# Patient Record
Sex: FEMALE | Race: WHITE | NOT HISPANIC OR LATINO | Employment: OTHER | URBAN - METROPOLITAN AREA
[De-identification: names, ages, dates, MRNs, and addresses within clinical notes are randomized per-mention and may not be internally consistent; named-entity substitution may affect disease eponyms.]

---

## 2017-01-02 ENCOUNTER — HOSPITAL ENCOUNTER (EMERGENCY)
Facility: HOSPITAL | Age: 47
Discharge: HOME | End: 2017-01-02
Attending: EMERGENCY MEDICINE
Payer: MEDICAID

## 2017-01-02 VITALS
SYSTOLIC BLOOD PRESSURE: 153 MMHG | DIASTOLIC BLOOD PRESSURE: 80 MMHG | HEART RATE: 99 BPM | TEMPERATURE: 98 F | BODY MASS INDEX: 27.88 KG/M2 | HEIGHT: 60 IN | OXYGEN SATURATION: 99 % | RESPIRATION RATE: 18 BRPM | WEIGHT: 142 LBS

## 2017-01-02 DIAGNOSIS — M25.571 ANKLE PAIN, RIGHT: ICD-10-CM

## 2017-01-02 DIAGNOSIS — S93.401A SPRAIN OF RIGHT ANKLE, UNSPECIFIED LIGAMENT, INITIAL ENCOUNTER: Primary | ICD-10-CM

## 2017-01-02 LAB
B-HCG UR QL: NEGATIVE
CTP QC/QA: YES

## 2017-01-02 PROCEDURE — 96372 THER/PROPH/DIAG INJ SC/IM: CPT

## 2017-01-02 PROCEDURE — 99284 EMERGENCY DEPT VISIT MOD MDM: CPT | Mod: 25

## 2017-01-02 PROCEDURE — 81025 URINE PREGNANCY TEST: CPT

## 2017-01-02 PROCEDURE — 63600175 PHARM REV CODE 636 W HCPCS: Performed by: EMERGENCY MEDICINE

## 2017-01-02 RX ORDER — OXYCODONE AND ACETAMINOPHEN 5; 325 MG/1; MG/1
1 TABLET ORAL EVERY 8 HOURS PRN
Qty: 18 TABLET | Refills: 0 | Status: ON HOLD | OUTPATIENT
Start: 2017-01-02 | End: 2017-04-27 | Stop reason: HOSPADM

## 2017-01-02 RX ORDER — HYDROMORPHONE HYDROCHLORIDE 2 MG/ML
1 INJECTION, SOLUTION INTRAMUSCULAR; INTRAVENOUS; SUBCUTANEOUS
Status: COMPLETED | OUTPATIENT
Start: 2017-01-02 | End: 2017-01-02

## 2017-01-02 RX ADMIN — HYDROMORPHONE HYDROCHLORIDE 1 MG: 2 INJECTION, SOLUTION INTRAMUSCULAR; INTRAVENOUS; SUBCUTANEOUS at 04:01

## 2017-01-02 NOTE — ED TRIAGE NOTES
Pt presents to ED c/o dizziness x 3 weeks and also injuring her right ankle after falling.  States she was on a ventilator a month ago due to pneumonia and she has become dizzy ever since.  Denies sob, ha, chest pains, nvd.

## 2017-01-02 NOTE — ED PROVIDER NOTES
Encounter Date: 1/2/2017    SCRIBE #1 NOTE: I, Mitch Thomas, am scribing for, and in the presence of,  Selin Garcia MD. I have scribed the following portions of the note - Other sections scribed: HPI, ROS.       History     Chief Complaint   Patient presents with    Dizziness     pt c/o dizziness X 3 wks and (R) ankle injury after twisting it walking down the stairs.     Review of patient's allergies indicates:  No Known Allergies  HPI Comments: CC: Leg pain    HPI: This 46 y.o. F, who has a past medical history of Asthma; Bipolar 1 disorder; Manic depression; and Seizures, presents to the ED for evaluation of right ankle pain secondary to a mechanical fall 2 nights ago. She was walking down wet steps when she slipped and fell downward on her buttocks; She landed on her right ankle and lower leg, which was flexed under her body. Pain is severe and worse with weight bearing. Norco is not relieving her pain. She had all of her top teeth pulled 5 days ago and is taking Norco for pain relief. She is scheduled be fitted for dentures at the dentist's office in three days. She was also recently admitted for a severe asthma exacerbation but, on exam, denies any shortness of breath or chest pain. She is compliant with pulmonary medications. She denies fever, nausea, vomiting, back pain and headache.    The history is provided by the patient and a relative.     Past Medical History   Diagnosis Date    Asthma     Bipolar 1 disorder     Manic depression     Seizures      No past medical history pertinent negatives.  Past Surgical History   Procedure Laterality Date    Hysterectomy      Appendectomy      Carpal tunnel release      Hernia repair       Family History   Problem Relation Age of Onset    Schizophrenia Mother     Diabetes Father     Heart disease Father      Social History   Substance Use Topics    Smoking status: Current Every Day Smoker     Packs/day: 1.00     Years: 30.00     Types: Cigarettes     Smokeless tobacco: None    Alcohol use 3.0 oz/week     5 Cans of beer per week     Review of Systems   Constitutional: Negative for fever.   HENT: Negative for sore throat.    Eyes: Negative for visual disturbance.   Respiratory: Negative for shortness of breath.    Cardiovascular: Negative for chest pain.   Gastrointestinal: Negative for nausea and vomiting.   Genitourinary: Negative for dysuria.   Musculoskeletal: Positive for arthralgias (Right ankle pain). Negative for back pain.   Skin: Negative for rash.   Neurological: Negative for headaches.       Physical Exam   Initial Vitals   BP Pulse Resp Temp SpO2   01/02/17 1455 01/02/17 1455 01/02/17 1455 01/02/17 1455 01/02/17 1455   153/80 99 18 98.4 °F (36.9 °C) 99 %     Physical Exam    Nursing note and vitals reviewed.  Constitutional: She appears well-developed and well-nourished. She is not diaphoretic. No distress.   HENT:   Head: Normocephalic and atraumatic.   Mouth/Throat: Abnormal dentition (no top teeth; no sign of infection). No dental abscesses or dental caries.   Eyes: EOM are normal.   Neck: Normal range of motion. Neck supple.   Cardiovascular: Normal rate and regular rhythm.   Pulmonary/Chest: Breath sounds normal. No respiratory distress. She has no wheezes.   Abdominal: Soft. Normal appearance and bowel sounds are normal. She exhibits no distension. There is no tenderness. There is no rebound and no guarding.   Musculoskeletal: Normal range of motion. She exhibits no edema.        Right ankle: She exhibits swelling and ecchymosis. Tenderness.        Right foot: There is tenderness.   Neurological: She is alert and oriented to person, place, and time.   Psychiatric: She has a normal mood and affect.         ED Course   Procedures  Labs Reviewed   POCT URINE PREGNANCY          X-Rays:   Independently Interpreted Readings:   Other Readings:  XR ankle, right: no acute fracture     Medical Decision Making:   History:   Old Medical Records: I decided  to obtain old medical records.  Initial Assessment:   This is an emergent evaluation of a 46-year-old woman who presented to the emergency department secondary to right ankle pain.  Differential Diagnosis:    Differential diagnoses included acute fracture, dislocation, musculoskeletal strain vs sprain, contusion.   Independently Interpreted Test(s):   I have ordered and independently interpreted X-rays - see prior notes.  Clinical Tests:   Lab Tests: Ordered and Reviewed  The following lab test(s) were unremarkable: UPT  Radiological Study: Ordered and Reviewed  ED Management:  On physical exam, pt c/o ttp to right ankle. No obvious deformity. Pulses intact. XR obtained and revealed:     Postoperative changes noted at the base of the 5th metatarsal.  No acute fractures or dislocations visualized. Ankle mortise is symmetric.  No erosive osseous changes demonstrated.There is suggestion of mild subcutaneous edema along the medial aspect of the ankle which is nonspecific.    Pt placed in ankle air splint and dc'd with Percocet for pain.  I have given strict return precautions and instructed to follow with PCP in 2-3 days for reevaluation.     Selin Garcia MD  16:41 PM  1/02/2017                 Scribe Attestation:   Scribe #1: I performed the above scribed service and the documentation accurately describes the services I performed. I attest to the accuracy of the note.    Attending Attestation:           Physician Attestation for Scribe:  Physician Attestation Statement for Scribe #1: I, Selin Garcia MD, reviewed documentation, as scribed by Mitch Thomas in my presence, and it is both accurate and complete.                 ED Course     Clinical Impression:   The primary encounter diagnosis was Sprain of right ankle, unspecified ligament, initial encounter. A diagnosis of Ankle pain, right was also pertinent to this visit.          Selin Garcia MD  01/11/17 7640

## 2017-01-02 NOTE — ED AVS SNAPSHOT
[facility]  [address] [place] [address]               Alina Menchaca   2017  3:12 PM   ED    Description:  Female : 1970   Department:  [facility]           Your Care was Coordinated By:     Provider Role From To    Selin Garcia MD Attending Provider 17 1519 --      Reason for Visit     Dizziness           Diagnoses this Visit        Comments    Sprain of right ankle, unspecified ligament, initial encounter    -  Primary     Ankle pain, right           ED Disposition     None           To Do List           Follow-up Information     Follow up with Meghan Hanna MD. Schedule an appointment as soon as possible for a visit in 1 week.    Specialty:  Family Medicine    Contact information:    [address] [place] [address]  [phone]          Schedule an appointment as soon as possible for a visit with Florencio Song Iii, MD.    Specialty:  Orthopedic Surgery    Why:  If symptoms worsen    Contact information:    [address] [address]  [phone]         These Medications        Disp Refills Start End    oxycodone-acetaminophen (PERCOCET) 5-325 mg per tablet 18 tablet 0 2017     Take 1 tablet by mouth every 8 (eight) hours as needed for Pain (take 1-2 tablets by mouth every 8 hrs as needed for pain. Do not drink alcohol or drive while taking these. Do not take with other sedating medications.). - Oral    Pharmacy: [facility] [address] [place] [address] [place] [address] AT [facility] Ph #: [phone]         [facility]     [facility] -  Assistance  Registered nurses in the [facility] provide clinical advisement, health education, appointment booking, and other advisory services.  Call for this free service at [phone].             Medications           Message regarding Medications     Verify the changes and/or additions to  your medication regime listed below are the same as discussed with your clinician today.  If any of these changes or additions are incorrect, please notify your healthcare provider.        START taking these NEW medications        Refills    oxycodone-acetaminophen (PERCOCET) 5-325 mg per tablet 0    Sig: Take 1 tablet by mouth every 8 (eight) hours as needed for Pain (take 1-2 tablets by mouth every 8 hrs as needed for pain. Do not drink alcohol or drive while taking these. Do not take with other sedating medications.).    Class: Print    Route: Oral      These medications were administered today        Dose Freq    hydromorphone (PF) injection 1 mg 1 mg ED 1 Time    Sig: Inject 0.5 mLs (1 mg total) into the muscle ED 1 Time.    Class: Normal    Route: Intramuscular      STOP taking these medications     hydrocodone-acetaminophen 5-325mg (NORCO) 5-325 mg per tablet Take 1 tablet by mouth every 6 (six) hours as needed for Pain.           Verify that the below list of medications is an accurate representation of the medications you are currently taking.  If none reported, the list may be blank. If incorrect, please contact your healthcare provider. Carry this list with you in case of emergency.           Current Medications     amantadine HCl (SYMMETREL) 100 mg capsule Take 100 mg by mouth 2 (two) times daily.    citalopram (CELEXA) 40 MG tablet Take 40 mg by mouth once daily.    furosemide (LASIX) 20 MG tablet Take 1 tablet (20 mg total) by mouth once daily.    HYDROXYZINE PAMOATE (VISTARIL ORAL) Take by mouth as needed.     levetiracetam (KEPPRA) 500 MG Tab Take 500 mg by mouth 2 (two) times daily.    phenytoin (DILANTIN) 100 MG ER capsule Take by mouth 3 (three) times daily.    quetiapine (SEROQUEL) 300 MG Tab Take 300 mg by mouth once daily.     ramelteon (ROZEREM) 8 mg tablet Take 1 tablet (8 mg total) by mouth nightly.    albuterol (PROVENTIL) 2.5 mg /3 mL (0.083 %) nebulizer solution Take 3 mLs (2.5 mg total)  by nebulization every 6 (six) hours as needed for Wheezing.    alprazolam (XANAX) 0.5 MG tablet Take 1 tablet (0.5 mg total) by mouth 3 (three) times daily.    benzonatate (TESSALON) 100 MG capsule Take 1 capsule (100 mg total) by mouth 3 (three) times daily as needed for Cough.    cephALEXin (KEFLEX) 500 MG capsule Take 1 capsule (500 mg total) by mouth every 8 (eight) hours.    FLUTICASONE/SALMETEROL (ADVAIR DISKUS INHL) Inhale into the lungs.    hydromorphone (PF) injection 1 mg Inject 0.5 mLs (1 mg total) into the muscle ED 1 Time.    oxycodone-acetaminophen (PERCOCET) 5-325 mg per tablet Take 1 tablet by mouth every 8 (eight) hours as needed for Pain (take 1-2 tablets by mouth every 8 hrs as needed for pain. Do not drink alcohol or drive while taking these. Do not take with other sedating medications.).           Clinical Reference Information           Your Vitals Were     BP Pulse Temp Resp Height Weight    153/80 99 98.4 °F (36.9 °C) 18 5' (1.524 m) 64.4 kg (142 lb)    Last Period SpO2 BMI          (Within Weeks) 99% 27.73 kg/m2        Allergies as of 1/2/2017     No Known Allergies      Immunizations Administered on Date of Encounter - 1/2/2017     None      ED Micro, Lab, POCT     Start Ordered       Status Ordering Provider    01/02/17 1505 01/02/17 1505  POCT urine pregnancy  Once      Final result       ED Imaging Orders     Start Ordered       Status Ordering Provider    01/02/17 1512 01/02/17 1512  X-Ray Ankle Complete Right  1 time imaging      Final result         Discharge Instructions         Ankle Inversion (Strength)     This exercise is for your right ankle. Switch sides for your left ankle.   1. Tie an elastic exercise band or tubing to the leg of a table. Tie the other end to your right foot.  2. Stand far enough away from the table so that the elastic band or tubing is pulled tight.  3. Point your right foot firmly to the left, pulling on the elastic band or tubing. Hold for 5  seconds.  4. Relax your foot back to a straight position. Repeat this exercise 10 times.  5. Do this exercise 3 times a day, or as instructed.  © 3945-4871 [employer]. [address] [place] [address], [place] [address]. All rights reserved. This information is not intended as a substitute for professional medical care. Always follow your healthcare professional's instructions.        Understanding Ankle Sprain    The ankle is the joint where the leg and foot meet. Bones are held in place by connective tissue called ligaments. When ankle ligaments are stretched to the point of pain and injury, it is called an ankle sprain. A sprain can tear the ligaments. These tears can be very small but still cause pain. Ankle sprains can be mild or severe.  What causes an ankle sprain?  A sprain may occur when you twist your ankle or bend it too far. This can happen when you stumble or fall. Things that can make an ankle sprain more likely include:  · Having had an ankle sprain before  · Playing sports that involve running and jumping. Or playing contact sports such as football or hockey.  · Wearing shoes that dont support your feet and ankles well  · Having ankles with poor strength and flexibility  Symptoms of an ankle sprain  Symptoms may include:  · Pain or soreness in the ankle  · Swelling  · Redness or bruising  · Not being able to walk or put weight on the affected foot  · Reduced range of motion in the ankle  · A popping or tearing feeling at the time the sprain occurs  · An abnormal or dislocated look to the ankle  · Instability or too much range of motion in the ankle  Treatment for an ankle sprain  Treatment focuses on reducing pain and swelling, and avoiding further injury. Treatments may include:  · Resting the ankle. Avoid putting weight on it. This may mean using crutches until the sprain heals.  · Prescription or over-the-counter pain medicines. These help reduce swelling and pain.  · Cold packs. These help  reduce pain and swelling.  · Raising your ankle above your heart. This helps reduce swelling.  · Wrapping the ankle with an elastic bandage or ankle brace. This helps reduce swelling and gives some support to the ankle. In rare cases, you may need a cast or boot.  · Stretching and other exercises. These improve flexibility and strength.  · Heat packs. These may be recommended before doing ankle exercises.  Possible complications of an ankle sprain  An ankle that has been weakened by a sprain can be more likely to have repeated sprains afterward. Doing exercises to strengthen your ankle and improve balance can reduce your risk for repeated sprains. Other possible complications are long-term (chronic) pain or an ankle that remains unstable.  When to call your healthcare provider  Call your healthcare provider right away if you have any of these:  · Fever of 100.4°F (38°C) or higher, or as directed  · Pain, numbness, discoloration, or coldness in the foot or toes  · Pain that gets worse  · Symptoms that dont get better, or get worse  · New symptoms   © 0558-1576 [employer]. [address] [place] [address]. All rights reserved. This information is not intended as a substitute for professional medical care. Always follow your healthcare professional's instructions.            Smoking Cessation     If you would like to quit smoking:   You may be eligible for free services if you are a [place] resident and started smoking cigarettes before September 1, 1988.  Call the Smoking Cessation Trust (SCT) toll free at (558) 109-2115 or (287) 030-0056.   Call 1-800-QUIT-NOW if you do not meet the above criteria.             [facility] complies with applicable Federal civil rights laws and does not discriminate on the basis of race, color, national origin, age, disability, or sex.        Language Assistance Services     ATTENTION: Language assistance services are available, free of  charge. Please call [phone].      ATENCIÓN: Si habla español, tiene a olguin disposición servicios gratuitos de asistencia lingüística. Llame al [phone].     CHÚ Ý: N?u b?n nói Ti?ng Vi?t, có các d?ch v? h? tr? ngôn ng? mi?n phí dành cho b?n. G?i s? [phone].

## 2017-01-02 NOTE — DISCHARGE INSTRUCTIONS
Ankle Inversion (Strength)     This exercise is for your right ankle. Switch sides for your left ankle.   1. Tie an elastic exercise band or tubing to the leg of a table. Tie the other end to your right foot.  2. Stand far enough away from the table so that the elastic band or tubing is pulled tight.  3. Point your right foot firmly to the left, pulling on the elastic band or tubing. Hold for 5 seconds.  4. Relax your foot back to a straight position. Repeat this exercise 10 times.  5. Do this exercise 3 times a day, or as instructed.  © 5324-5853 [facility]. [address] [place] [address]. All rights reserved. This information is not intended as a substitute for professional medical care. Always follow your healthcare professional's instructions.        Understanding Ankle Sprain    The ankle is the joint where the leg and foot meet. Bones are held in place by connective tissue called ligaments. When ankle ligaments are stretched to the point of pain and injury, it is called an ankle sprain. A sprain can tear the ligaments. These tears can be very small but still cause pain. Ankle sprains can be mild or severe.  What causes an ankle sprain?  A sprain may occur when you twist your ankle or bend it too far. This can happen when you stumble or fall. Things that can make an ankle sprain more likely include:  · Having had an ankle sprain before  · Playing sports that involve running and jumping. Or playing contact sports such as football or hockey.  · Wearing shoes that dont support your feet and ankles well  · Having ankles with poor strength and flexibility  Symptoms of an ankle sprain  Symptoms may include:  · Pain or soreness in the ankle  · Swelling  · Redness or bruising  · Not being able to walk or put weight on the affected foot  · Reduced range of motion in the ankle  · A popping or tearing feeling at the time the sprain occurs  · An abnormal or dislocated look to the  ankle  · Instability or too much range of motion in the ankle  Treatment for an ankle sprain  Treatment focuses on reducing pain and swelling, and avoiding further injury. Treatments may include:  · Resting the ankle. Avoid putting weight on it. This may mean using crutches until the sprain heals.  · Prescription or over-the-counter pain medicines. These help reduce swelling and pain.  · Cold packs. These help reduce pain and swelling.  · Raising your ankle above your heart. This helps reduce swelling.  · Wrapping the ankle with an elastic bandage or ankle brace. This helps reduce swelling and gives some support to the ankle. In rare cases, you may need a cast or boot.  · Stretching and other exercises. These improve flexibility and strength.  · Heat packs. These may be recommended before doing ankle exercises.  Possible complications of an ankle sprain  An ankle that has been weakened by a sprain can be more likely to have repeated sprains afterward. Doing exercises to strengthen your ankle and improve balance can reduce your risk for repeated sprains. Other possible complications are long-term (chronic) pain or an ankle that remains unstable.  When to call your healthcare provider  Call your healthcare provider right away if you have any of these:  · Fever of 100.4°F (38°C) or higher, or as directed  · Pain, numbness, discoloration, or coldness in the foot or toes  · Pain that gets worse  · Symptoms that dont get better, or get worse  · New symptoms   © 5932-8621 [employer]. [address] [place] [address], [place] [address]. All rights reserved. This information is not intended as a substitute for professional medical care. Always follow your healthcare professional's instructions.

## 2017-03-27 PROCEDURE — 99284 EMERGENCY DEPT VISIT MOD MDM: CPT

## 2017-03-28 ENCOUNTER — HOSPITAL ENCOUNTER (EMERGENCY)
Facility: HOSPITAL | Age: 47
Discharge: HOME | End: 2017-03-28
Attending: EMERGENCY MEDICINE
Payer: MEDICAID

## 2017-03-28 VITALS
OXYGEN SATURATION: 98 % | HEIGHT: 61 IN | TEMPERATURE: 98 F | SYSTOLIC BLOOD PRESSURE: 135 MMHG | HEART RATE: 93 BPM | BODY MASS INDEX: 28.32 KG/M2 | RESPIRATION RATE: 18 BRPM | WEIGHT: 150 LBS | DIASTOLIC BLOOD PRESSURE: 67 MMHG

## 2017-03-28 DIAGNOSIS — M25.561 ACUTE PAIN OF RIGHT KNEE: ICD-10-CM

## 2017-03-28 DIAGNOSIS — S20.211A RIB CONTUSION, RIGHT, INITIAL ENCOUNTER: Primary | ICD-10-CM
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
## 2017-03-28 DIAGNOSIS — R07.9 CHEST PAIN: ICD-10-CM

## 2017-03-28 PROCEDURE — 25000003 PHARM REV CODE 250: Performed by: NURSE PRACTITIONER

## 2017-03-28 PROCEDURE — 93005 ELECTROCARDIOGRAM TRACING: CPT

## 2017-03-28 RX ORDER — TRAZODONE HYDROCHLORIDE 100 MG/1
100 TABLET ORAL NIGHTLY
COMMUNITY
End: 2020-05-12

## 2017-03-28 RX ORDER — RISPERIDONE 0.25 MG/1
TABLET ORAL
COMMUNITY
End: 2017-04-18

## 2017-03-28 RX ORDER — HYDROCODONE BITARTRATE AND ACETAMINOPHEN 5; 325 MG/1; MG/1
1 TABLET ORAL
Status: COMPLETED | OUTPATIENT
Start: 2017-03-28 | End: 2017-03-28

## 2017-03-28 RX ORDER — MELOXICAM 7.5 MG/1
15 TABLET ORAL DAILY
Qty: 14 TABLET | Refills: 0 | Status: SHIPPED | OUTPATIENT
Start: 2017-03-28 | End: 2017-04-11

## 2017-03-28 RX ADMIN — HYDROCODONE BITARTRATE AND ACETAMINOPHEN 1 TABLET: 5; 325 TABLET ORAL at 02:03

## 2017-03-28 NOTE — ED AVS SNAPSHOT
[facility]  [address] [place] [address]               Alina Menchaca   3/28/2017  1:12 AM   ED    Description:  Female : 1970   Department:  [facility]           Your Care was Coordinated By:     Provider Role From To    Max Mandel III, MD Attending Provider 17 --    Bhavana Farooq NP Nurse Practitioner 17 --      Reason for Visit     Knee Pain           Diagnoses this Visit        Comments    Rib contusion, right, initial encounter    -  Primary     Chest pain         Fall, initial encounter         Acute pain of right knee           ED Disposition     None           To Do List           Follow-up Information     Schedule an appointment as soon as possible for a visit with Meghan Hanna MD.    Specialty:  Family Medicine    Why:  As needed, If symptoms worsen    Contact information:    [address] [place] [address]  [phone]         These Medications        Disp Refills Start End    meloxicam (MOBIC) 7.5 MG tablet 14 tablet 0 3/28/2017 2017    Take 2 tablets (15 mg total) by mouth once daily. - Oral    [facility] [address] [place] [address] -  [address] AT [facility] [address] Ph #: [phone]         [facility]     [facility] -  Assistance  Registered nurses in the [facility] provide clinical advisement, health education, appointment booking, and other advisory services.  Call for this free service at [phone].             Medications           Message regarding Medications     Verify the changes and/or additions to your medication regime listed below are the same as discussed with your clinician today.  If any of these changes or additions are incorrect, please notify your healthcare provider.        START taking these NEW medications        Refills    meloxicam (MOBIC) 7.5 MG tablet 0    Sig: Take 2 tablets  (15 mg total) by mouth once daily.    Class: Print    Route: Oral      These medications were administered today        Dose Freq    hydrocodone-acetaminophen 5-325mg per tablet 1 tablet 1 tablet ED 1 Time    Sig: Take 1 tablet by mouth ED 1 Time.    Class: Normal    Route: Oral    Cosign for Ordering: Required by Max Mandel III, MD           Verify that the below list of medications is an accurate representation of the medications you are currently taking.  If none reported, the list may be blank. If incorrect, please contact your healthcare provider. Carry this list with you in case of emergency.           Current Medications     albuterol (PROVENTIL) 2.5 mg /3 mL (0.083 %) nebulizer solution Take 3 mLs (2.5 mg total) by nebulization every 6 (six) hours as needed for Wheezing.    alprazolam (XANAX) 0.5 MG tablet Take 1 tablet (0.5 mg total) by mouth 3 (three) times daily.    amantadine HCl (SYMMETREL) 100 mg capsule Take 100 mg by mouth 2 (two) times daily.    citalopram (CELEXA) 40 MG tablet Take 40 mg by mouth once daily.    FLUTICASONE/SALMETEROL (ADVAIR DISKUS INHL) Inhale into the lungs.    furosemide (LASIX) 20 MG tablet Take 1 tablet (20 mg total) by mouth once daily.    HYDROXYZINE PAMOATE (VISTARIL ORAL) Take by mouth as needed.     levetiracetam (KEPPRA) 500 MG Tab Take 500 mg by mouth 2 (two) times daily.    phenytoin (DILANTIN) 100 MG ER capsule Take by mouth 3 (three) times daily.    risperidone (RISPERDAL) 0.25 MG Tab Take by mouth.    trazodone (DESYREL) 100 MG tablet Take 100 mg by mouth every evening.    benzonatate (TESSALON) 100 MG capsule Take 1 capsule (100 mg total) by mouth 3 (three) times daily as needed for Cough.    cephALEXin (KEFLEX) 500 MG capsule Take 1 capsule (500 mg total) by mouth every 8 (eight) hours.    meloxicam (MOBIC) 7.5 MG tablet Take 2 tablets (15 mg total) by mouth once daily.    oxycodone-acetaminophen (PERCOCET) 5-325 mg per tablet Take 1 tablet by mouth every 8  "(eight) hours as needed for Pain (take 1-2 tablets by mouth every 8 hrs as needed for pain. Do not drink alcohol or drive while taking these. Do not take with other sedating medications.).    quetiapine (SEROQUEL) 300 MG Tab Take 300 mg by mouth once daily.     ramelteon (ROZEREM) 8 mg tablet Take 1 tablet (8 mg total) by mouth nightly.           Clinical Reference Information           Your Vitals Were     BP Pulse Temp Resp Height Weight    135/67 (BP Location: Left arm, Patient Position: Sitting) 93 97.7 °F (36.5 °C) (Oral) 18 5' 1" (1.549 m) 68 kg (150 lb)    Last Period SpO2 BMI          (Within Weeks) 98% 28.34 kg/m2        Allergies as of 3/28/2017     No Known Allergies      Immunizations Administered on Date of Encounter - 3/28/2017     None      ED Micro, Lab, POCT     Start Ordered       Status Ordering Provider    03/28/17 0035 03/28/17 0034    Once,   Status:  Canceled      Canceled       ED Imaging Orders     Start Ordered       Status Ordering Provider    03/28/17 0159 03/28/17 0159  X-Ray Chest PA And Lateral  1 time imaging      Final result         Discharge Instructions       Please return to the ED for any new or worsening symptoms: chest pain, shortness of breath, loss of consciousness or any other concerns. Please follow up with primary care within in the week. You may also call [phone] for the [facility] same day appointment line.    Discharge References/Attachments     ACE WRAP (ENGLISH)    CHEST WALL CONTUSION (ENGLISH)      Smoking Cessation     If you would like to quit smoking:   You may be eligible for free services if you are a [place] resident and started smoking cigarettes before September 1, 1988.  Call the Smoking Cessation Trust (SCT) toll free at (087) 729-3912 or (909) 862-2429.   Call 0-583-QUIT-NOW if you do not meet the above criteria.             [facility] complies with applicable Federal civil rights laws and does not discriminate on the " basis of race, color, national origin, age, disability, or sex.        Language Assistance Services     ATTENTION: Language assistance services are available, free of charge. Please call [phone].      ATENCIÓN: Si habla zakiya, tiene a olguin disposición servicios gratuitos de asistencia lingüística. Llame al [phone].     CHÚ Ý: N?u b?n nói Ti?ng Vi?t, có các d?ch v? h? tr? ngôn ng? mi?n phí dành cho b?n. G?i s? [phone].

## 2017-03-28 NOTE — DISCHARGE INSTRUCTIONS
Please return to the ED for any new or worsening symptoms: chest pain, shortness of breath, loss of consciousness or any other concerns. Please follow up with primary care within in the week. You may also call [phone] for the [facility] same day appointment line.

## 2017-03-28 NOTE — ED PROVIDER NOTES
Encounter Date: 3/27/2017       History     Chief Complaint   Patient presents with    Knee Pain     pt reports that her knee locked on her which caused her to fall and now she has pain in her abdominal area and cannot lift her right arm     Review of patient's allergies indicates:  No Known Allergies  HPI Comments: Chief Complaint: Right knee and left chest wall pain x2 days    HPI: This is a 46-year-old female who presents to the ED with complaints of acute, moderate right-sided knee and left chest wall pain for 2 days.  Patient reports that while walking downstairs, her right knee gave out and she fell 3 days ago.  She denies head trauma, loss consciousness, syncope.  She reports this happened again 2 days ago.  She reports hitting her left chest wall and a table.  Chest pain is worse with deep inspiration.  Mild relief with ibuprofen and BC powder.    The history is provided by the patient.     Past Medical History:   Diagnosis Date    Asthma     Bipolar 1 disorder     Manic depression     Seizures      Past Surgical History:   Procedure Laterality Date    APPENDECTOMY      CARPAL TUNNEL RELEASE      HERNIA REPAIR      HYSTERECTOMY       Family History   Problem Relation Age of Onset    Schizophrenia Mother     Diabetes Father     Heart disease Father      Social History   Substance Use Topics    Smoking status: Current Every Day Smoker     Packs/day: 1.00     Years: 30.00     Types: Cigarettes    Smokeless tobacco: None    Alcohol use No     Review of Systems   Constitutional: Negative for chills and fever.   HENT: Negative for congestion.    Respiratory: Negative for cough and shortness of breath.    Cardiovascular: Positive for chest pain (left anterior ribs).   Gastrointestinal: Negative for abdominal pain, diarrhea, nausea and vomiting.   Genitourinary: Negative for flank pain.   Musculoskeletal: Positive for arthralgias (right knee).   Skin: Negative for rash and wound.   Neurological:  Negative for seizures, syncope and headaches.       Physical Exam   Initial Vitals   BP Pulse Resp Temp SpO2   03/27/17 2331 03/27/17 2331 03/27/17 2331 03/27/17 2331 03/27/17 2331   135/67 93 18 97.7 °F (36.5 °C) 98 %     Physical Exam    Constitutional: Vital signs are normal. She appears well-developed and well-nourished.  Non-toxic appearance.   Eyes: EOM are normal.   Neck: Full passive range of motion without pain. Neck supple. No rigidity.   Cardiovascular: Normal rate, S1 normal, S2 normal and normal heart sounds. Exam reveals no gallop.    No murmur heard.  Pulmonary/Chest: Effort normal and breath sounds normal. No tachypnea. She has no decreased breath sounds. She has no wheezes. She has no rhonchi. She has no rales. She exhibits tenderness. She exhibits no crepitus and no retraction.       Musculoskeletal:        Right knee: She exhibits normal range of motion, no swelling, no effusion, no ecchymosis, no deformity, no erythema, normal alignment, no LCL laxity, no bony tenderness and no MCL laxity. Tenderness found. Medial joint line tenderness noted.   Neurological: She is alert and oriented to person, place, and time. She has normal strength. Gait normal. GCS eye subscore is 4. GCS verbal subscore is 5. GCS motor subscore is 6.   Skin: Skin is warm and dry. No rash noted.         ED Course   Procedures  Labs Reviewed - No data to display  EKG Readings: (Independently Interpreted)   Initial Reading: No STEMI. Rhythm: Normal Sinus Rhythm. Heart Rate: 72. Ectopy: No Ectopy. Conduction: Normal. ST Segments: Normal ST Segments. T Waves: Normal. Axis: Normal. Clinical Impression: Normal Sinus Rhythm          Medical Decision Making:   ED Management:  This is a 46-year-old female who presents to the ED with complaints of right knee pain as well as left rib pain after fall 2 days ago.  She is afebrile and well-appearing.  She denies shortness of breath, syncope, headache.  On exam, there is tenderness to the  right knee and left anterior ribs.  No bony deformity, erythema or swelling.  Her gait is normal.  Chest x-ray and twelve-lead ECG are normal.  No evidence of pneumothorax.  I suspect a low probability for PE, septic joint, fracture, dislocation.  One dose Norco given in the ED.  Discharged home with prescription for meloxicam and instructions for supportive care and follow-up.  Return precautions given.  Patient's case is discussed with Dr. Mandel, who agrees with her plan of care.              Attending Attestation:     Physician Attestation Statement for NP/PA:   I discussed this assessment and plan of this patient with the NP/PA, but I did not personally examine the patient. The face to face encounter was performed by the NP/PA.    Other NP/PA Attestation Additions:      Medical Decision Makin-year-old female presents complaining of right ear pain and left rib pain after a fall 2 days ago.  Physical examination does reveal tenderness to the right knee and left anterior ribs, although there is no deformity, knee effusion, gait abnormality, or other cardiopulmonary abnormality.    I have personally reviewed and interpreted the patient's chest x-ray and there is no acute abnormality.    I have personally reviewed and interpreted the patient's EKG:  NSR, nl axis, nl intervals, no definite acute ischemic changes    Agree with above assessment and plan.                    ED Course     Clinical Impression:   The primary encounter diagnosis was Rib contusion, right, initial encounter. Diagnoses of Chest pain, Fall, initial encounter, and Acute pain of right knee were also pertinent to this visit.    Disposition:   Disposition: Discharged  Condition: Stable       Bhavana Farooq NP  17 [id number]       Max Mandel III, MD  17 [id number]

## 2017-03-28 NOTE — ED TRIAGE NOTES
45 yo female arrives to ED after falling from her knee locking; pt reports that her left side hurts and she is scared that she broke a rib

## 2017-04-18 ENCOUNTER — HOSPITAL ENCOUNTER (INPATIENT)
Facility: HOSPITAL | Age: 47
LOS: 8 days | Discharge: HOME HEALTH | DRG: 193 | End: 2017-04-27
Attending: EMERGENCY MEDICINE | Admitting: EMERGENCY MEDICINE
Payer: MEDICAID

## 2017-04-18 DIAGNOSIS — J18.9 PNEUMONIA OF BOTH LUNGS DUE TO INFECTIOUS ORGANISM, UNSPECIFIED PART OF LUNG: Primary | ICD-10-CM

## 2017-04-18 LAB
ALBUMIN SERPL BCP-MCNC: 2.9 G/DL
ALLENS TEST: ABNORMAL
ALP SERPL-CCNC: 102 U/L
ALT SERPL W/O P-5'-P-CCNC: 34 U/L
ANION GAP SERPL CALC-SCNC: 12 MMOL/L
AST SERPL-CCNC: 72 U/L
BASOPHILS # BLD AUTO: 0.01 K/UL
BASOPHILS NFR BLD: 0.1 %
BILIRUB SERPL-MCNC: 0.3 MG/DL
BNP SERPL-MCNC: 89 PG/ML
BUN SERPL-MCNC: 17 MG/DL
CALCIUM SERPL-MCNC: 9.1 MG/DL
CHLORIDE SERPL-SCNC: 103 MMOL/L
CO2 SERPL-SCNC: 23 MMOL/L
CREAT SERPL-MCNC: 0.9 MG/DL
DELSYS: ABNORMAL
DIFFERENTIAL METHOD: ABNORMAL
EOSINOPHIL # BLD AUTO: 0 K/UL
EOSINOPHIL NFR BLD: 0.1 %
EP: 5
ERYTHROCYTE [DISTWIDTH] IN BLOOD BY AUTOMATED COUNT: 13.4 %
ERYTHROCYTE [SEDIMENTATION RATE] IN BLOOD BY WESTERGREN METHOD: 12 MM/H
EST. GFR  (AFRICAN AMERICAN): >60 ML/MIN/1.73 M^2
EST. GFR  (NON AFRICAN AMERICAN): >60 ML/MIN/1.73 M^2
FIO2: 50
GLUCOSE SERPL-MCNC: 136 MG/DL
HCO3 UR-SCNC: 25.4 MMOL/L (ref 24–28)
HCT VFR BLD AUTO: 31.6 %
HGB BLD-MCNC: 10.4 G/DL
IP: 10
LACTATE SERPL-SCNC: 0.8 MMOL/L
LYMPHOCYTES # BLD AUTO: 0.6 K/UL
LYMPHOCYTES NFR BLD: 5.5 %
MCH RBC QN AUTO: 29.2 PG
MCHC RBC AUTO-ENTMCNC: 32.9 %
MCV RBC AUTO: 89 FL
MODE: ABNORMAL
MONOCYTES # BLD AUTO: 0.5 K/UL
MONOCYTES NFR BLD: 5.2 %
NEUTROPHILS # BLD AUTO: 9 K/UL
NEUTROPHILS NFR BLD: 88.7 %
PCO2 BLDA: 29 MMHG (ref 35–45)
PH SMN: 7.55 [PH] (ref 7.35–7.45)
PLATELET # BLD AUTO: 199 K/UL
PMV BLD AUTO: 9.2 FL
PO2 BLDA: 131 MMHG (ref 80–100)
POC BE: 3 MMOL/L
POC SATURATED O2: 99 % (ref 95–100)
POC TCO2: 26 MMOL/L (ref 23–27)
POTASSIUM SERPL-SCNC: 3.3 MMOL/L
PROT SERPL-MCNC: 6.5 G/DL
RBC # BLD AUTO: 3.56 M/UL
SAMPLE: ABNORMAL
SITE: ABNORMAL
SODIUM SERPL-SCNC: 138 MMOL/L
TROPONIN I SERPL DL<=0.01 NG/ML-MCNC: 0.01 NG/ML
WBC # BLD AUTO: 10.08 K/UL

## 2017-04-18 PROCEDURE — 96365 THER/PROPH/DIAG IV INF INIT: CPT

## 2017-04-18 PROCEDURE — 87040 BLOOD CULTURE FOR BACTERIA: CPT

## 2017-04-18 PROCEDURE — 99285 EMERGENCY DEPT VISIT HI MDM: CPT | Mod: 25

## 2017-04-18 PROCEDURE — 84484 ASSAY OF TROPONIN QUANT: CPT

## 2017-04-18 PROCEDURE — 36600 WITHDRAWAL OF ARTERIAL BLOOD: CPT

## 2017-04-18 PROCEDURE — 96361 HYDRATE IV INFUSION ADD-ON: CPT

## 2017-04-18 PROCEDURE — 80053 COMPREHEN METABOLIC PANEL: CPT

## 2017-04-18 PROCEDURE — 63600175 PHARM REV CODE 636 W HCPCS: Performed by: EMERGENCY MEDICINE

## 2017-04-18 PROCEDURE — 83605 ASSAY OF LACTIC ACID: CPT

## 2017-04-18 PROCEDURE — 83880 ASSAY OF NATRIURETIC PEPTIDE: CPT

## 2017-04-18 PROCEDURE — 85025 COMPLETE CBC W/AUTO DIFF WBC: CPT

## 2017-04-18 PROCEDURE — 82803 BLOOD GASES ANY COMBINATION: CPT

## 2017-04-18 PROCEDURE — 93005 ELECTROCARDIOGRAM TRACING: CPT

## 2017-04-18 PROCEDURE — 99900035 HC TECH TIME PER 15 MIN (STAT)

## 2017-04-18 PROCEDURE — 27000221 HC OXYGEN, UP TO 24 HOURS

## 2017-04-18 PROCEDURE — 12000002 HC ACUTE/MED SURGE SEMI-PRIVATE ROOM

## 2017-04-18 PROCEDURE — 25000003 PHARM REV CODE 250: Performed by: EMERGENCY MEDICINE

## 2017-04-18 PROCEDURE — 94660 CPAP INITIATION&MGMT: CPT

## 2017-04-18 PROCEDURE — 27000190 HC CPAP FULL FACE MASK W/VALVE

## 2017-04-18 PROCEDURE — 94644 CONT INHLJ TX 1ST HOUR: CPT

## 2017-04-18 PROCEDURE — 96375 TX/PRO/DX INJ NEW DRUG ADDON: CPT

## 2017-04-18 PROCEDURE — 25000242 PHARM REV CODE 250 ALT 637 W/ HCPCS: Performed by: EMERGENCY MEDICINE

## 2017-04-18 RX ORDER — SODIUM CHLORIDE 9 MG/ML
1000 INJECTION, SOLUTION INTRAVENOUS
Status: COMPLETED | OUTPATIENT
Start: 2017-04-18 | End: 2017-04-19

## 2017-04-18 RX ORDER — DIPHENHYDRAMINE HYDROCHLORIDE 50 MG/ML
25 INJECTION INTRAMUSCULAR; INTRAVENOUS
Status: DISCONTINUED | OUTPATIENT
Start: 2017-04-18 | End: 2017-04-18

## 2017-04-18 RX ORDER — METHYLPREDNISOLONE SOD SUCC 125 MG
125 VIAL (EA) INJECTION
Status: COMPLETED | OUTPATIENT
Start: 2017-04-18 | End: 2017-04-18

## 2017-04-18 RX ORDER — MOXIFLOXACIN HYDROCHLORIDE 400 MG/250ML
400 INJECTION, SOLUTION INTRAVENOUS
Status: COMPLETED | OUTPATIENT
Start: 2017-04-18 | End: 2017-04-19

## 2017-04-18 RX ORDER — ACETAMINOPHEN 500 MG
1000 TABLET ORAL
Status: COMPLETED | OUTPATIENT
Start: 2017-04-18 | End: 2017-04-18

## 2017-04-18 RX ORDER — IPRATROPIUM BROMIDE 0.5 MG/2.5ML
500 SOLUTION RESPIRATORY (INHALATION)
Status: COMPLETED | OUTPATIENT
Start: 2017-04-18 | End: 2017-04-18

## 2017-04-18 RX ORDER — ALBUTEROL SULFATE 2.5 MG/.5ML
15 SOLUTION RESPIRATORY (INHALATION)
Status: COMPLETED | OUTPATIENT
Start: 2017-04-18 | End: 2017-04-18

## 2017-04-18 RX ORDER — LORAZEPAM 2 MG/ML
0.5 INJECTION INTRAMUSCULAR
Status: COMPLETED | OUTPATIENT
Start: 2017-04-18 | End: 2017-04-18

## 2017-04-18 RX ADMIN — IPRATROPIUM BROMIDE 500 MCG: 0.5 SOLUTION RESPIRATORY (INHALATION) at 10:04

## 2017-04-18 RX ADMIN — ACETAMINOPHEN 1000 MG: 500 TABLET ORAL at 10:04

## 2017-04-18 RX ADMIN — ALBUTEROL SULFATE 15 MG: 2.5 SOLUTION RESPIRATORY (INHALATION) at 10:04

## 2017-04-18 RX ADMIN — MOXIFLOXACIN HYDROCHLORIDE 400 MG: 400 INJECTION, SOLUTION INTRAVENOUS at 11:04

## 2017-04-18 RX ADMIN — METHYLPREDNISOLONE SODIUM SUCCINATE 125 MG: 125 INJECTION, POWDER, FOR SOLUTION INTRAMUSCULAR; INTRAVENOUS at 10:04

## 2017-04-18 RX ADMIN — LORAZEPAM 0.5 MG: 2 INJECTION, SOLUTION INTRAMUSCULAR; INTRAVENOUS at 11:04

## 2017-04-18 RX ADMIN — SODIUM CHLORIDE 1000 ML: 0.9 INJECTION, SOLUTION INTRAVENOUS at 10:04

## 2017-04-18 NOTE — IP AVS SNAPSHOT
[place] [address] [place] [address]  Phone: [phone]           Patient Discharge Instructions   Our goal is to set you up for success. This packet includes information on your condition, medications, and your home care.  It will help you care for yourself to prevent having to return to the hospital.     Please ask your nurse if you have any questions.      There are many details to remember when preparing to leave the hospital. Here is what you will need to do:    1. Take your medicine. If you are prescribed medications, review your Medication List on the following pages. You may have new medications to  at the pharmacy and others that you'll need to stop taking. Review the instructions for how and when to take your medications. Talk with your doctor or nurses if you are unsure of what to do.     2. Go to your follow-up appointments. Specific follow-up information is listed in the following pages. Your may be contacted by a nurse or clinical provider about future appointments. Be sure we have all of the phone numbers to reach you. Please contact your provider's office if you are unable to make an appointment.     3. Watch for warning signs. Your doctor or nurse will give you detailed warning signs to watch for and when to call for assistance. These instructions may also include educational information about your condition. If you experience any of warning signs to your health, call your doctor.               ** Verify the list of medication(s) below is accurate and up to date. Carry this with you in case of emergency. If your medications have changed, please notify your healthcare provider.             Medication List      START taking these medications        Additional Info                      divalproex 250 MG EC tablet   Commonly known as:  DEPAKOTE   Quantity:  90 tablet   Refills:  3   Dose:  250 mg    Last time this was given:  250 mg on 4/27/2017  5:22 AM    Instructions:  Take 1 tablet (250 mg total) by mouth every 8 (eight) hours.     Begin Date    AM    Noon    PM    Bedtime       duloxetine 30 MG capsule   Commonly known as:  CYMBALTA   Quantity:  30 capsule   Refills:  3   Dose:  30 mg    Last time this was given:  30 mg on 4/27/2017  9:18 AM   Instructions:  Take 1 capsule (30 mg total) by mouth once daily.     Begin Date    AM    Noon    PM    Bedtime       fluticasone-vilanterol 100-25 mcg/dose diskus inhaler   Commonly known as:  BREO   Quantity:  60 each   Refills:  3   Dose:  1 puff    Last time this was given:  1 puff on 4/27/2017  8:01 AM   Instructions:  Inhale 1 puff into the lungs once daily. Controller     Begin Date    AM    Noon    PM    Bedtime       predniSONE 20 MG tablet   Commonly known as:  DELTASONE   Quantity:  3 tablet   Refills:  0   Dose:  10 mg    Last time this was given:  20 mg on 4/27/2017  9:19 AM   Instructions:  Take 0.5 tablets (10 mg total) by mouth once daily.     Begin Date    AM    Noon    PM    Bedtime       promethazine-codeine 6.25-10 mg/5 ml 6.25-10 mg/5 mL syrup   Commonly known as:  PHENERGAN with CODEINE   Quantity:  240 mL   Refills:  0   Dose:  5 mL    Last time this was given:  5 mLs on 4/27/2017  2:29 AM   Instructions:  Take 5 mLs by mouth every 4 (four) hours as needed for Cough.     Begin Date    AM    Noon    PM    Bedtime         CONTINUE taking these medications        Additional Info                      albuterol 2.5 mg /3 mL (0.083 %) nebulizer solution   Commonly known as:  PROVENTIL   Quantity:  30 Box   Refills:  0   Dose:  2.5 mg    Instructions:  Take 3 mLs (2.5 mg total) by nebulization every 6 (six) hours as needed for Wheezing.     Begin Date    AM    Noon    PM    Bedtime       trazodone 100 MG tablet   Commonly known as:  DESYREL   Refills:  0   Dose:  100 mg    Last time this was given:  100 mg on 4/26/2017  9:12 PM   Instructions:  Take 100 mg by mouth every evening.     Begin Date    AM    Noon     PM    Bedtime         STOP taking these medications     ADVAIR DISKUS INHL       alprazolam 0.5 MG tablet   Commonly known as:  XANAX       amantadine HCl 100 mg capsule   Commonly known as:  SYMMETREL       CELEXA 40 MG tablet   Generic drug:  citalopram       furosemide 20 MG tablet   Commonly known as:  LASIX       oxycodone-acetaminophen 5-325 mg per tablet   Commonly known as:  PERCOCET       VISTARIL ORAL            Where to Get Your Medications      These medications were sent to [facility] [id number]  [address] [place] - [address] AT [facility] [address]  [address] [address]    Hours:  24-hours Phone:  [phone]     divalproex 250 MG EC tablet    duloxetine 30 MG capsule    fluticasone-vilanterol 100-25 mcg/dose diskus inhaler    predniSONE 20 MG tablet         You can get these medications from any pharmacy     Bring a paper prescription for each of these medications     albuterol 2.5 mg /3 mL (0.083 %) nebulizer solution    promethazine-codeine 6.25-10 mg/5 ml 6.25-10 mg/5 mL syrup                  Please bring to all follow up appointments:    1. A copy of your discharge instructions.  2. All medicines you are currently taking in their original bottles.  3. Identification and insurance card.    Please arrive 15 minutes ahead of scheduled appointment time.    Please call 24 hours in advance if you must reschedule your appointment and/or time.        Follow-up Information     Follow up with [facility] On 5/5/2017.    Why:  Outpatient Services, PCP follow-up appointment. Patient should arrive by 10:30AM.     Contact information:    [address] [facility] [address]  [address] [place] [address] [id number] [phone]          Discharge Instructions     Future Orders    Activity as tolerated     Call MD for:  difficulty breathing or increased cough     Call MD for:  increased confusion or weakness     Call MD for:  persistent dizziness, light-headedness,  or visual disturbances     Call MD for:  persistent nausea and vomiting or diarrhea     Call MD for:  redness, tenderness, or signs of infection (pain, swelling, redness, odor or green/yellow discharge around incision site)     Call MD for:  severe persistent headache     Call MD for:  severe uncontrolled pain     Call MD for:  temperature >100.4     Call MD for:  worsening rash     Diet general     Questions:    Total calories:      Fat restriction, if any:      Protein restriction, if any:      Na restriction, if any:      Fluid restriction:      Additional restrictions:          Primary Diagnosis     Your primary diagnosis was:  Acute Respiratory Failure With Hypoxia And Hypercapnia      Admission Information     Date & Time Provider Department CSN    4/18/2017  9:26 PM Flavia Swenson MD [facility] [id number]      Care Providers     Provider Role Specialty Primary office phone    Flavia Swenson MD Attending Provider Hospitalist [phone]    Alberto Sheriff MD Consulting Physician  Psychiatry [phone]    Arianna Raymundo MD Consulting Physician  Pulmonary Disease [phone]      Your Vitals Were     BP Pulse Temp Resp Height Weight    138/75 87 97.8 °F (36.6 °C) 18 5' (1.524 m) 73.2 kg (161 lb 6 oz)    Last Period SpO2 BMI          (Within Weeks) 95% 31.52 kg/m2        Recent Lab Values     No lab values to display.      Allergies as of 4/27/2017     No Known Allergies      [facility]     [facility] Care [facility] - 24/7 Assistance  Unless otherwise directed by your provider, please contact [facility], our nurse care line that is available for 24/7 assistance.     Registered nurses in the [facility] provide clinical advisement, health education, appointment booking, and other advisory services.  Call for this free service at [phone].        Advance Directives     An advance directive is a document which, in the event you are no longer able to make  decisions for yourself, tells your healthcare team what kind of treatment you do or do not want to receive, or who you would like to make those decisions for you.  If you do not currently have an advance directive, [facility] encourages you to create one.  For more information call:  [phone]), [phone] ([phone]),  or log on to [email or web address].        Smoking Cessation     If you would like to quit smoking:   You may be eligible for free services if you are a [place] resident and started smoking cigarettes before September 1, 1988.  Call the Smoking Cessation Trust (SCT) toll free at (267) 531-9038 or (438) 654-0992.   Call 7-785-QUIT-NOW if you do not meet the above criteria.   Contact us via email: [email or web address]   View our website for more information: [email or web address]        Language Assistance Services     ATTENTION: Language assistance services are available, free of charge. Please call [phone].      ATENCIÓN: Si habla español, tiene a olguin disposición servicios gratuitos de asistencia lingüística. Llame al [phone].     CHÚ Ý: N?u b?n nói Ti?ng Vi?t, có các d?ch v? h? tr? ngôn ng? mi?n phí dành cho b?n. G?i s? [phone].        Pneumonmia Discharge Instructions                 [facility] complies with applicable Federal civil rights laws and does not discriminate on the basis of race, color, national origin, age, disability, or sex.

## 2017-04-19 PROBLEM — J18.9 PNEUMONIA OF BOTH LUNGS DUE TO INFECTIOUS ORGANISM: Status: ACTIVE | Noted: 2017-04-19

## 2017-04-19 LAB
ALBUMIN SERPL BCP-MCNC: 2.6 G/DL
ALP SERPL-CCNC: 97 U/L
ALT SERPL W/O P-5'-P-CCNC: 32 U/L
ANION GAP SERPL CALC-SCNC: 8 MMOL/L
AST SERPL-CCNC: 56 U/L
BACTERIA #/AREA URNS HPF: ABNORMAL /HPF
BASOPHILS # BLD AUTO: 0 K/UL
BASOPHILS NFR BLD: 0 %
BILIRUB SERPL-MCNC: 0.2 MG/DL
BILIRUB UR QL STRIP: NEGATIVE
BUN SERPL-MCNC: 13 MG/DL
CALCIUM SERPL-MCNC: 8.5 MG/DL
CHLORIDE SERPL-SCNC: 106 MMOL/L
CLARITY UR: CLEAR
CO2 SERPL-SCNC: 26 MMOL/L
COLOR UR: YELLOW
CREAT SERPL-MCNC: 0.8 MG/DL
DIFFERENTIAL METHOD: ABNORMAL
EOSINOPHIL # BLD AUTO: 0 K/UL
EOSINOPHIL NFR BLD: 0 %
ERYTHROCYTE [DISTWIDTH] IN BLOOD BY AUTOMATED COUNT: 13.4 %
EST. GFR  (AFRICAN AMERICAN): >60 ML/MIN/1.73 M^2
EST. GFR  (NON AFRICAN AMERICAN): >60 ML/MIN/1.73 M^2
GLUCOSE SERPL-MCNC: 186 MG/DL
GLUCOSE UR QL STRIP: NEGATIVE
HCT VFR BLD AUTO: 29.8 %
HGB BLD-MCNC: 9.8 G/DL
HGB UR QL STRIP: ABNORMAL
HYALINE CASTS #/AREA URNS LPF: 3 /LPF
KETONES UR QL STRIP: NEGATIVE
LEUKOCYTE ESTERASE UR QL STRIP: NEGATIVE
LYMPHOCYTES # BLD AUTO: 0.2 K/UL
LYMPHOCYTES NFR BLD: 2.6 %
MCH RBC QN AUTO: 29.5 PG
MCHC RBC AUTO-ENTMCNC: 32.9 %
MCV RBC AUTO: 90 FL
MICROSCOPIC COMMENT: ABNORMAL
MONOCYTES # BLD AUTO: 0.2 K/UL
MONOCYTES NFR BLD: 2.6 %
NEUTROPHILS # BLD AUTO: 7.9 K/UL
NEUTROPHILS NFR BLD: 94.6 %
NITRITE UR QL STRIP: NEGATIVE
PH UR STRIP: 5 [PH] (ref 5–8)
PLATELET # BLD AUTO: 167 K/UL
PMV BLD AUTO: 9.1 FL
POTASSIUM SERPL-SCNC: 3.6 MMOL/L
PROT SERPL-MCNC: 6 G/DL
PROT UR QL STRIP: ABNORMAL
RBC # BLD AUTO: 3.32 M/UL
RBC #/AREA URNS HPF: 2 /HPF (ref 0–4)
SODIUM SERPL-SCNC: 140 MMOL/L
SP GR UR STRIP: 1.01 (ref 1–1.03)
URN SPEC COLLECT METH UR: ABNORMAL
UROBILINOGEN UR STRIP-ACNC: NEGATIVE EU/DL
WBC # BLD AUTO: 8.37 K/UL
WBC #/AREA URNS HPF: 2 /HPF (ref 0–5)
YEAST URNS QL MICRO: ABNORMAL

## 2017-04-19 PROCEDURE — 94640 AIRWAY INHALATION TREATMENT: CPT

## 2017-04-19 PROCEDURE — 63600175 PHARM REV CODE 636 W HCPCS: Performed by: EMERGENCY MEDICINE

## 2017-04-19 PROCEDURE — 36415 COLL VENOUS BLD VENIPUNCTURE: CPT

## 2017-04-19 PROCEDURE — 25000003 PHARM REV CODE 250: Performed by: EMERGENCY MEDICINE

## 2017-04-19 PROCEDURE — 85025 COMPLETE CBC W/AUTO DIFF WBC: CPT

## 2017-04-19 PROCEDURE — 81000 URINALYSIS NONAUTO W/SCOPE: CPT

## 2017-04-19 PROCEDURE — 63600175 PHARM REV CODE 636 W HCPCS: Performed by: INTERNAL MEDICINE

## 2017-04-19 PROCEDURE — 25000003 PHARM REV CODE 250: Performed by: INTERNAL MEDICINE

## 2017-04-19 PROCEDURE — 12000002 HC ACUTE/MED SURGE SEMI-PRIVATE ROOM

## 2017-04-19 PROCEDURE — 27000221 HC OXYGEN, UP TO 24 HOURS

## 2017-04-19 PROCEDURE — 25000242 PHARM REV CODE 250 ALT 637 W/ HCPCS: Performed by: EMERGENCY MEDICINE

## 2017-04-19 PROCEDURE — 80053 COMPREHEN METABOLIC PANEL: CPT

## 2017-04-19 RX ORDER — RISPERIDONE 1 MG/1
2 TABLET ORAL NIGHTLY
Status: DISCONTINUED | OUTPATIENT
Start: 2017-04-19 | End: 2017-04-21

## 2017-04-19 RX ORDER — SODIUM CHLORIDE 0.9 % (FLUSH) 0.9 %
3 SYRINGE (ML) INJECTION EVERY 8 HOURS
Status: DISCONTINUED | OUTPATIENT
Start: 2017-04-19 | End: 2017-04-27 | Stop reason: HOSPADM

## 2017-04-19 RX ORDER — MORPHINE SULFATE 10 MG/ML
4 INJECTION INTRAMUSCULAR; INTRAVENOUS; SUBCUTANEOUS EVERY 4 HOURS PRN
Status: DISCONTINUED | OUTPATIENT
Start: 2017-04-19 | End: 2017-04-19

## 2017-04-19 RX ORDER — OXYCODONE AND ACETAMINOPHEN 5; 325 MG/1; MG/1
1 TABLET ORAL EVERY 8 HOURS PRN
Status: DISCONTINUED | OUTPATIENT
Start: 2017-04-19 | End: 2017-04-19

## 2017-04-19 RX ORDER — FAMOTIDINE 20 MG/1
20 TABLET, FILM COATED ORAL DAILY
Status: DISCONTINUED | OUTPATIENT
Start: 2017-04-19 | End: 2017-04-20

## 2017-04-19 RX ORDER — FLUTICASONE FUROATE AND VILANTEROL 100; 25 UG/1; UG/1
1 POWDER RESPIRATORY (INHALATION) DAILY
Status: DISCONTINUED | OUTPATIENT
Start: 2017-04-19 | End: 2017-04-27 | Stop reason: HOSPADM

## 2017-04-19 RX ORDER — ACETAMINOPHEN 325 MG/1
650 TABLET ORAL EVERY 6 HOURS PRN
Status: DISCONTINUED | OUTPATIENT
Start: 2017-04-19 | End: 2017-04-19

## 2017-04-19 RX ORDER — CITALOPRAM 20 MG/1
40 TABLET, FILM COATED ORAL DAILY
Status: DISCONTINUED | OUTPATIENT
Start: 2017-04-19 | End: 2017-04-19

## 2017-04-19 RX ORDER — FLUTICASONE PROPIONATE AND SALMETEROL 100; 50 UG/1; UG/1
1 POWDER RESPIRATORY (INHALATION) 2 TIMES DAILY
Status: DISCONTINUED | OUTPATIENT
Start: 2017-04-19 | End: 2017-04-19 | Stop reason: CLARIF

## 2017-04-19 RX ORDER — PROMETHAZINE HYDROCHLORIDE AND CODEINE PHOSPHATE 6.25; 1 MG/5ML; MG/5ML
5 SOLUTION ORAL EVERY 4 HOURS PRN
Status: DISCONTINUED | OUTPATIENT
Start: 2017-04-19 | End: 2017-04-27 | Stop reason: HOSPADM

## 2017-04-19 RX ORDER — TRAZODONE HYDROCHLORIDE 50 MG/1
50 TABLET ORAL NIGHTLY
Status: DISCONTINUED | OUTPATIENT
Start: 2017-04-20 | End: 2017-04-21

## 2017-04-19 RX ORDER — MOXIFLOXACIN HYDROCHLORIDE 400 MG/250ML
400 INJECTION, SOLUTION INTRAVENOUS
Status: DISCONTINUED | OUTPATIENT
Start: 2017-04-19 | End: 2017-04-25

## 2017-04-19 RX ORDER — LORAZEPAM 2 MG/ML
0.5 INJECTION INTRAMUSCULAR
Status: COMPLETED | OUTPATIENT
Start: 2017-04-19 | End: 2017-04-19

## 2017-04-19 RX ORDER — IBUPROFEN 200 MG
1 TABLET ORAL DAILY
Status: DISCONTINUED | OUTPATIENT
Start: 2017-04-19 | End: 2017-04-27 | Stop reason: HOSPADM

## 2017-04-19 RX ORDER — HEPARIN SODIUM 5000 [USP'U]/ML
5000 INJECTION, SOLUTION INTRAVENOUS; SUBCUTANEOUS EVERY 8 HOURS
Status: DISCONTINUED | OUTPATIENT
Start: 2017-04-19 | End: 2017-04-21

## 2017-04-19 RX ORDER — IPRATROPIUM BROMIDE AND ALBUTEROL SULFATE 2.5; .5 MG/3ML; MG/3ML
3 SOLUTION RESPIRATORY (INHALATION) EVERY 4 HOURS
Status: DISCONTINUED | OUTPATIENT
Start: 2017-04-19 | End: 2017-04-27 | Stop reason: HOSPADM

## 2017-04-19 RX ORDER — ACETAMINOPHEN 325 MG/1
650 TABLET ORAL EVERY 6 HOURS PRN
Status: DISCONTINUED | OUTPATIENT
Start: 2017-04-19 | End: 2017-04-27 | Stop reason: HOSPADM

## 2017-04-19 RX ORDER — METHYLPREDNISOLONE SOD SUCC 125 MG
80 VIAL (EA) INJECTION EVERY 8 HOURS
Status: DISCONTINUED | OUTPATIENT
Start: 2017-04-19 | End: 2017-04-24

## 2017-04-19 RX ORDER — FUROSEMIDE 20 MG/1
20 TABLET ORAL DAILY
Status: DISCONTINUED | OUTPATIENT
Start: 2017-04-19 | End: 2017-04-20

## 2017-04-19 RX ORDER — OXYCODONE AND ACETAMINOPHEN 5; 325 MG/1; MG/1
1 TABLET ORAL EVERY 8 HOURS PRN
Status: DISCONTINUED | OUTPATIENT
Start: 2017-04-19 | End: 2017-04-27 | Stop reason: HOSPADM

## 2017-04-19 RX ORDER — AMOXICILLIN 250 MG
1 CAPSULE ORAL 2 TIMES DAILY
Status: DISCONTINUED | OUTPATIENT
Start: 2017-04-19 | End: 2017-04-27 | Stop reason: HOSPADM

## 2017-04-19 RX ORDER — LORAZEPAM 0.5 MG/1
0.5 TABLET ORAL EVERY 6 HOURS PRN
Status: DISCONTINUED | OUTPATIENT
Start: 2017-04-19 | End: 2017-04-20

## 2017-04-19 RX ORDER — AMANTADINE HYDROCHLORIDE 100 MG/1
100 CAPSULE, GELATIN COATED ORAL 2 TIMES DAILY
Status: DISCONTINUED | OUTPATIENT
Start: 2017-04-19 | End: 2017-04-22

## 2017-04-19 RX ORDER — TRAZODONE HYDROCHLORIDE 50 MG/1
100 TABLET ORAL NIGHTLY
Status: DISCONTINUED | OUTPATIENT
Start: 2017-04-20 | End: 2017-04-19

## 2017-04-19 RX ADMIN — IPRATROPIUM BROMIDE AND ALBUTEROL SULFATE 3 ML: .5; 3 SOLUTION RESPIRATORY (INHALATION) at 08:04

## 2017-04-19 RX ADMIN — AMANTADINE HYDROCHLORIDE 100 MG: 100 CAPSULE ORAL at 09:04

## 2017-04-19 RX ADMIN — LORAZEPAM 0.5 MG: 0.5 TABLET ORAL at 03:04

## 2017-04-19 RX ADMIN — IPRATROPIUM BROMIDE AND ALBUTEROL SULFATE 3 ML: .5; 3 SOLUTION RESPIRATORY (INHALATION) at 12:04

## 2017-04-19 RX ADMIN — HEPARIN SODIUM 5000 UNITS: 5000 INJECTION, SOLUTION INTRAVENOUS; SUBCUTANEOUS at 05:04

## 2017-04-19 RX ADMIN — NICOTINE 1 PATCH: 21 PATCH, EXTENDED RELEASE TRANSDERMAL at 12:04

## 2017-04-19 RX ADMIN — Medication 3 ML: at 02:04

## 2017-04-19 RX ADMIN — IPRATROPIUM BROMIDE AND ALBUTEROL SULFATE 3 ML: .5; 3 SOLUTION RESPIRATORY (INHALATION) at 04:04

## 2017-04-19 RX ADMIN — HEPARIN SODIUM 5000 UNITS: 5000 INJECTION, SOLUTION INTRAVENOUS; SUBCUTANEOUS at 01:04

## 2017-04-19 RX ADMIN — PROMETHAZINE HYDROCHLORIDE AND CODEINE PHOSPHATE 5 ML: 6.25; 1 SYRUP ORAL at 12:04

## 2017-04-19 RX ADMIN — OXYCODONE HYDROCHLORIDE AND ACETAMINOPHEN 1 TABLET: 5; 325 TABLET ORAL at 09:04

## 2017-04-19 RX ADMIN — IPRATROPIUM BROMIDE AND ALBUTEROL SULFATE 3 ML: .5; 3 SOLUTION RESPIRATORY (INHALATION) at 11:04

## 2017-04-19 RX ADMIN — IPRATROPIUM BROMIDE AND ALBUTEROL SULFATE 3 ML: .5; 3 SOLUTION RESPIRATORY (INHALATION) at 03:04

## 2017-04-19 RX ADMIN — FLUTICASONE FUROATE AND VILANTEROL TRIFENATATE 1 PUFF: 100; 25 POWDER RESPIRATORY (INHALATION) at 08:04

## 2017-04-19 RX ADMIN — OXYCODONE HYDROCHLORIDE AND ACETAMINOPHEN 1 TABLET: 5; 325 TABLET ORAL at 12:04

## 2017-04-19 RX ADMIN — DOCUSATE SODIUM AND SENNOSIDES 1 TABLET: 8.6; 5 TABLET, FILM COATED ORAL at 09:04

## 2017-04-19 RX ADMIN — MOXIFLOXACIN HYDROCHLORIDE 400 MG: 400 INJECTION, SOLUTION INTRAVENOUS at 11:04

## 2017-04-19 RX ADMIN — METHYLPREDNISOLONE SODIUM SUCCINATE 80 MG: 125 INJECTION, POWDER, FOR SOLUTION INTRAMUSCULAR; INTRAVENOUS at 01:04

## 2017-04-19 RX ADMIN — LORAZEPAM 0.5 MG: 2 INJECTION, SOLUTION INTRAMUSCULAR; INTRAVENOUS at 09:04

## 2017-04-19 RX ADMIN — FUROSEMIDE 20 MG: 20 TABLET ORAL at 08:04

## 2017-04-19 RX ADMIN — CITALOPRAM HYDROBROMIDE 40 MG: 20 TABLET ORAL at 08:04

## 2017-04-19 RX ADMIN — Medication 3 ML: at 05:04

## 2017-04-19 RX ADMIN — METHYLPREDNISOLONE SODIUM SUCCINATE 80 MG: 125 INJECTION, POWDER, FOR SOLUTION INTRAMUSCULAR; INTRAVENOUS at 05:04

## 2017-04-19 RX ADMIN — FAMOTIDINE 20 MG: 20 TABLET, FILM COATED ORAL at 12:04

## 2017-04-19 RX ADMIN — METHYLPREDNISOLONE SODIUM SUCCINATE 80 MG: 125 INJECTION, POWDER, FOR SOLUTION INTRAMUSCULAR; INTRAVENOUS at 09:04

## 2017-04-19 RX ADMIN — LORAZEPAM 0.5 MG: 0.5 TABLET ORAL at 09:04

## 2017-04-19 RX ADMIN — DOCUSATE SODIUM AND SENNOSIDES 1 TABLET: 8.6; 5 TABLET, FILM COATED ORAL at 08:04

## 2017-04-19 RX ADMIN — HEPARIN SODIUM 5000 UNITS: 5000 INJECTION, SOLUTION INTRAVENOUS; SUBCUTANEOUS at 09:04

## 2017-04-19 RX ADMIN — Medication 3 ML: at 09:04

## 2017-04-19 NOTE — PROGRESS NOTES
Called to patient bedside patient with SOB, increased RR 36 with bs-exp wh/co. Patient on NRM with sats of 99%. Duoneb tx given and tolerated with FREDDIE patient stated feeling better.

## 2017-04-19 NOTE — PLAN OF CARE
Problem: Patient Care Overview  Goal: Plan of Care Review  Outcome: Ongoing (interventions implemented as appropriate)  Patient free from falls, injury, or any further trauma throughout shift. Pt sats  with non-rebreather. Pt agitated and restless, keeps requesting anxiety medication despite already administering it. Daughter at bedside attempting to calm pt down. Tele on, seen on monitor. Will cont to monitor.     04/19/17 1650   Coping/Psychosocial   Plan Of Care Reviewed With patient;daughter

## 2017-04-19 NOTE — NURSING
Patient arrived via wheelchair with transport personnel and daughter at her side Patient. Oxygen continued at 5L NC and O2 sat is 96%. Pt experiencing dyspnea after moving from chair to bed. Pt able to answer all questions and follow commands. Pt denies any pain. Will continue to monitor.

## 2017-04-19 NOTE — NURSING
Assumed care of patient, received report from charge nurse. Pt in no acute distress, nonrebreather mask in place. Will continue to monitor.

## 2017-04-19 NOTE — ED PROVIDER NOTES
"Encounter Date: 2017    SCRIBE #1 NOTE: I, Kevin Lomax, am scribing for, and in the presence of,  Alberto Pepper MD. I have scribed the following portions of the note - Other sections scribed: HPI, ROS, PE, EKG and MDM.       History     Chief Complaint   Patient presents with    Shortness of Breath     since Friday. "I feel like I can't breathe." +CP and COUGH.      Review of patient's allergies indicates:  No Known Allergies  HPI Comments: CC: Shortness of Breath     HPI: This 46 y.o F everyday smoker with seizures and asthma presents to the ED c/o acute onset of constant and severe (10/10) SOB with associated chest pain and cough x5 days. The pt also reports headaches, bilateral eye burning, wheezing, subjective fever, diaphoresis, difficulty sleeping, chest pain and blue lips. The pt drove to [place] and found out that her mother  yesterday. The pt drove home from [place] today and suspects her symptoms were exacerbated during the car ride. The pt denies wearing O2 at home. The pt was seen in this ED, intubated and admitted for multilobar pneumonia 16. An echo was performed 16, which was normal. The pt also had an EF of 55%. The pt attempted breathing tx at home with no relief.    The history is provided by the patient. No  was used.     Past Medical History:   Diagnosis Date    Asthma     Bipolar 1 disorder     Manic depression     Seizures      Past Surgical History:   Procedure Laterality Date    APPENDECTOMY      CARPAL TUNNEL RELEASE      HERNIA REPAIR      HYSTERECTOMY       Family History   Problem Relation Age of Onset    Schizophrenia Mother     Diabetes Father     Heart disease Father      Social History   Substance Use Topics    Smoking status: Current Every Day Smoker     Packs/day: 1.00     Years: 30.00     Types: Cigarettes    Smokeless tobacco: None    Alcohol use No     Review of Systems   Constitutional: Positive for activity change " (inability to sleep), diaphoresis and fever (subjective).   HENT: Negative for sore throat.    Eyes:        (+) bilateral eye burning   Respiratory: Positive for cough and wheezing. Negative for shortness of breath.    Cardiovascular: Positive for chest pain.   Gastrointestinal: Negative for nausea.   Genitourinary: Negative for dysuria.   Musculoskeletal: Negative for back pain.   Skin: Positive for color change (blue lips). Negative for rash.   Neurological: Positive for headaches. Negative for weakness.   Hematological: Does not bruise/bleed easily.       Physical Exam   Initial Vitals   BP Pulse Resp Temp SpO2   04/18/17 2122 04/18/17 2122 04/18/17 2122 04/18/17 2122 04/18/17 2122   135/68 120 21 100.2 °F (37.9 °C) 82 %     Physical Exam    Nursing note and vitals reviewed.  Constitutional: She appears well-developed and well-nourished.  Non-toxic appearance. She does not appear ill.   HENT:   Head: Normocephalic and atraumatic.   Eyes: EOM are normal.   Neck: Neck supple.   Cardiovascular: Regular rhythm.   Pulmonary/Chest: Tachypnea noted. She is in respiratory distress. She has wheezes (inspiratory and expiratory). She has rhonchi.   Abdominal: Soft. Normal appearance and bowel sounds are normal. She exhibits no distension. There is no tenderness.   Musculoskeletal: Normal range of motion.   Neurological: She is alert.   Skin: Skin is warm and dry.   Psychiatric: She has a normal mood and affect.         ED Course   Critical Care  Date/Time: 4/19/2017 3:37 AM  Performed by: DANELLE PILLAI  Authorized by: MARLENY VAZQUEZ   Direct patient critical care time: 10 minutes  Additional history critical care time: 10 minutes  Ordering / reviewing critical care time: 10 minutes  Documentation critical care time: 5 minutes  Consulting other physicians critical care time: 5 minutes  Total critical care time (exclusive of procedural time) : 40 minutes  Critical care was necessary to treat or prevent imminent or  life-threatening deterioration of the following conditions: respiratory failure.  Critical care was time spent personally by me on the following activities: obtaining history from patient or surrogate, pulse oximetry, re-evaluation of patient's condition, ordering and performing treatments and interventions, evaluation of patient's response to treatment, ordering and review of laboratory studies, ordering and review of radiographic studies, examination of patient and review of old charts.        Labs Reviewed   CBC W/ AUTO DIFFERENTIAL - Abnormal; Notable for the following:        Result Value    RBC 3.56 (*)     Hemoglobin 10.4 (*)     Hematocrit 31.6 (*)     Gran # 9.0 (*)     Lymph # 0.6 (*)     Gran% 88.7 (*)     Lymph% 5.5 (*)     All other components within normal limits   COMPREHENSIVE METABOLIC PANEL - Abnormal; Notable for the following:     Potassium 3.3 (*)     Glucose 136 (*)     Albumin 2.9 (*)     AST 72 (*)     All other components within normal limits   ISTAT PROCEDURE - Abnormal; Notable for the following:     POC PH 7.550 (*)     POC PCO2 29.0 (*)     POC PO2 131 (*)     All other components within normal limits   CULTURE, BLOOD   CULTURE, BLOOD   LACTIC ACID, PLASMA   TROPONIN I   B-TYPE NATRIURETIC PEPTIDE   URINALYSIS     EKG Readings: (Independently Interpreted)   Previous EKG Date: 113 bpm. Rhythm: Normal Sinus Rhythm. Ectopy: No Ectopy. Conduction: Normal. ST Segments: Normal ST Segments. T Waves: Normal. Clinical Impression: Normal Sinus Rhythm          Medical Decision Making:   History:   Old Medical Records: I decided to obtain old medical records.  Clinical Tests:   Lab Tests: Ordered and Reviewed  Radiological Study: Ordered and Reviewed  Medical Tests: Ordered and Reviewed  ED Management:  This was a 46-year-old female with a history of COPD who arrives with shortness of breath.  The patient had sats of 72% on room air.  She was cyanotic.  She has been taking breathing treatments all  day without relief.  The patient also has tachycardia.  Blood pressure is normal.  On exam, she is in respiratory distress.  She has inspiratory and expiratory wheezing and rhonchi.  She was requesting CPAP.  I obliged.  In the meantime, an ABG revealed hyperventilation.  There was no evidence of hypercapnia.  Her chest x-ray shows bilateral airspace disease.  The differential is broad, but given her hypoxia, left shift and low-grade fever, she most likely has bilateral pneumonia.    She responded well to nasal cannula oxygen.  Sats are 93% on nasal cannula after discontinuing the BiPAP.  She was treated with a continuous nebulizer and steroids.  She will be admitted to the hospital medicine service for further treatment of her respiratory distress and infection.  She is stable for the floor.            Scribe Attestation:   Scribe #1: I performed the above scribed service and the documentation accurately describes the services I performed. I attest to the accuracy of the note.    Attending Attestation:           Physician Attestation for Scribe:  Physician Attestation Statement for Scribe #1: I, Alberto Pepper MD, reviewed documentation, as scribed by Kevin Lomax in my presence, and it is both accurate and complete.                 ED Course     Clinical Impression:   The encounter diagnosis was Pneumonia of both lungs due to infectious organism, unspecified part of lung.    Disposition:   Disposition: Admitted  Condition: Stable       Alberto Pepper MD  04/19/17 0554

## 2017-04-19 NOTE — SUBJECTIVE & OBJECTIVE
Past Medical History:   Diagnosis Date    Asthma     Bipolar 1 disorder     Manic depression     Seizures        Past Surgical History:   Procedure Laterality Date    APPENDECTOMY      CARPAL TUNNEL RELEASE      HERNIA REPAIR      HYSTERECTOMY         Review of patient's allergies indicates:  No Known Allergies    No current facility-administered medications on file prior to encounter.      Current Outpatient Prescriptions on File Prior to Encounter   Medication Sig    albuterol (PROVENTIL) 2.5 mg /3 mL (0.083 %) nebulizer solution Take 3 mLs (2.5 mg total) by nebulization every 6 (six) hours as needed for Wheezing.    citalopram (CELEXA) 40 MG tablet Take 40 mg by mouth once daily.    FLUTICASONE/SALMETEROL (ADVAIR DISKUS INHL) Inhale into the lungs.    furosemide (LASIX) 20 MG tablet Take 1 tablet (20 mg total) by mouth once daily.    HYDROXYZINE PAMOATE (VISTARIL ORAL) Take by mouth as needed.     oxycodone-acetaminophen (PERCOCET) 5-325 mg per tablet Take 1 tablet by mouth every 8 (eight) hours as needed for Pain (take 1-2 tablets by mouth every 8 hrs as needed for pain. Do not drink alcohol or drive while taking these. Do not take with other sedating medications.).    trazodone (DESYREL) 100 MG tablet Take 100 mg by mouth every evening.    alprazolam (XANAX) 0.5 MG tablet Take 1 tablet (0.5 mg total) by mouth 3 (three) times daily. (Patient taking differently: Take 0.5 mg by mouth 3 (three) times daily as needed. )    amantadine HCl (SYMMETREL) 100 mg capsule Take 100 mg by mouth 2 (two) times daily.     Family History     Problem Relation (Age of Onset)    Diabetes Father    Heart disease Father    Schizophrenia Mother        Social History Main Topics    Smoking status: Current Every Day Smoker     Packs/day: 1.00     Years: 30.00     Types: Cigarettes    Smokeless tobacco: Not on file    Alcohol use No    Drug use: Yes     Special: Marijuana    Sexual activity: Yes     Partners: Female      Review of Systems   Constitutional: Positive for diaphoresis and fever.   HENT: Negative for congestion and sore throat.    Eyes: Positive for pain. Negative for photophobia.   Respiratory: Positive for cough, shortness of breath and wheezing.    Cardiovascular: Positive for chest pain. Negative for leg swelling.   Gastrointestinal: Negative for abdominal distention and abdominal pain.   Musculoskeletal: Negative for gait problem and joint swelling.   Skin: Negative for pallor and rash.   Neurological: Positive for headaches. Negative for seizures.   Psychiatric/Behavioral: Negative for behavioral problems and hallucinations.     Objective:     Vital Signs (Most Recent):  Temp: 97.5 °F (36.4 °C) (04/19/17 0845)  Pulse: 89 (04/19/17 1150)  Resp: (!) 26 (04/19/17 1150)  BP: (!) 114/56 (04/19/17 0845)  SpO2: 98 % (04/19/17 1150) Vital Signs (24h Range):  Temp:  [97.5 °F (36.4 °C)-100.2 °F (37.9 °C)] 97.5 °F (36.4 °C)  Pulse:  [] 89  Resp:  [17-36] 26  SpO2:  [81 %-99 %] 98 %  BP: (100-135)/(51-73) 114/56     Weight: 75.5 kg (166 lb 7.2 oz)  Body mass index is 32.51 kg/(m^2).    Physical Exam   Constitutional: She is oriented to person, place, and time. She appears well-developed and well-nourished. No distress.   HENT:   Right Ear: External ear normal.   Left Ear: External ear normal.   Nose: Nose normal.   Eyes: EOM are normal. Pupils are equal, round, and reactive to light. No scleral icterus.   Neck: Normal range of motion. Neck supple. No thyromegaly present.   Cardiovascular: Normal rate and normal heart sounds.  Exam reveals no friction rub.    No murmur heard.  Pulmonary/Chest: She has wheezes.   Frequent coughing, difficulty speaking full sentences on room air, coarse and wheezes throughout, bronchial breath sounds over left chest    Abdominal: Soft. Bowel sounds are normal. She exhibits no mass. There is no tenderness.   No hepatosplenomegaly   Musculoskeletal: Normal range of motion. She exhibits no  edema or deformity.   Neurological: She is alert and oriented to person, place, and time. No cranial nerve deficit.   Skin: Skin is warm and dry. No pallor.        Significant Labs:   CBC:   Recent Labs  Lab 04/18/17 2144 04/19/17  0459   WBC 10.08 8.37   HGB 10.4* 9.8*   HCT 31.6* 29.8*    167     CMP:   Recent Labs  Lab 04/18/17 2144 04/19/17  0458    140   K 3.3* 3.6    106   CO2 23 26   * 186*   BUN 17 13   CREATININE 0.9 0.8   CALCIUM 9.1 8.5*   PROT 6.5 6.0   ALBUMIN 2.9* 2.6*   BILITOT 0.3 0.2   ALKPHOS 102 97   AST 72* 56*   ALT 34 32   ANIONGAP 12 8   EGFRNONAA >60 >60     Lactic Acid:   Recent Labs  Lab 04/18/17  2240   LACTATE 0.8     Troponin:   Recent Labs  Lab 04/18/17  2144   TROPONINI 0.009     Significant Imaging: CXR reviewed

## 2017-04-19 NOTE — NURSING
Pt getting extremely agitated about nonrebreather. Complaining about tubing was too short, added extension to allow for more movement. Ativan administered. Will cont to monitor.

## 2017-04-19 NOTE — H&P
"[facility] Hospital Medicine  History & Physical    Patient Name: Alina Menchaca  MRN: [id number]  Admission Date: 4/18/2017  Attending Physician: Flavia Swenson MD  Primary Care Provider: Meghan Hanna MD         Patient information was obtained from patient, relative(s), past medical records and ER records.     Subjective:     Principal Problem:Pneumonia of both lungs due to infectious organism    Chief Complaint:   Chief Complaint   Patient presents with    Shortness of Breath     since Friday. "I feel like I can't breathe." +CP and COUGH.         HPI: Ms. Menchaca is a 45 yo woman with asthma, h/o seizures, and bipolar disorder who presented for cough and shortness of breath. Four days ago she started having shortness of breath and cough. Her coughing was so sever that she has not been able to sleep. She presented to the ER when her son noted her lips to be blue and checked her pulse ox which read in the 70s. She also reports fevers, wheezing, and pleuritic chest pain. She did drive to [place] 4/12/2017 and returned the day of presentation. Her mother passed away the day prior to presentation. She was admitted for pneumonia and intubated in December. She had since been doing well and does not require supplemental oxygen at home.    Past Medical History:   Diagnosis Date    Asthma     Bipolar 1 disorder     Manic depression     Seizures        Past Surgical History:   Procedure Laterality Date    APPENDECTOMY      CARPAL TUNNEL RELEASE      HERNIA REPAIR      HYSTERECTOMY         Review of patient's allergies indicates:  No Known Allergies    No current facility-administered medications on file prior to encounter.      Current Outpatient Prescriptions on File Prior to Encounter   Medication Sig    albuterol (PROVENTIL) 2.5 mg /3 mL (0.083 %) nebulizer solution Take 3 mLs (2.5 mg total) by nebulization every 6 (six) hours as needed for Wheezing.    citalopram (CELEXA) 40 MG tablet " Take 40 mg by mouth once daily.    FLUTICASONE/SALMETEROL (ADVAIR DISKUS INHL) Inhale into the lungs.    furosemide (LASIX) 20 MG tablet Take 1 tablet (20 mg total) by mouth once daily.    HYDROXYZINE PAMOATE (VISTARIL ORAL) Take by mouth as needed.     oxycodone-acetaminophen (PERCOCET) 5-325 mg per tablet Take 1 tablet by mouth every 8 (eight) hours as needed for Pain (take 1-2 tablets by mouth every 8 hrs as needed for pain. Do not drink alcohol or drive while taking these. Do not take with other sedating medications.).    trazodone (DESYREL) 100 MG tablet Take 100 mg by mouth every evening.    alprazolam (XANAX) 0.5 MG tablet Take 1 tablet (0.5 mg total) by mouth 3 (three) times daily. (Patient taking differently: Take 0.5 mg by mouth 3 (three) times daily as needed. )    amantadine HCl (SYMMETREL) 100 mg capsule Take 100 mg by mouth 2 (two) times daily.     Family History     Problem Relation (Age of Onset)    Diabetes Father    Heart disease Father    Schizophrenia Mother        Social History Main Topics    Smoking status: Current Every Day Smoker     Packs/day: 1.00     Years: 30.00     Types: Cigarettes    Smokeless tobacco: Not on file    Alcohol use No    Drug use: Yes     Special: Marijuana    Sexual activity: Yes     Partners: Female     Review of Systems   Constitutional: Positive for diaphoresis and fever.   HENT: Negative for congestion and sore throat.    Eyes: Positive for pain. Negative for photophobia.   Respiratory: Positive for cough, shortness of breath and wheezing.    Cardiovascular: Positive for chest pain. Negative for leg swelling.   Gastrointestinal: Negative for abdominal distention and abdominal pain.   Musculoskeletal: Negative for gait problem and joint swelling.   Skin: Negative for pallor and rash.   Neurological: Positive for headaches. Negative for seizures.   Psychiatric/Behavioral: Negative for behavioral problems and hallucinations.     Objective:     Vital Signs  (Most Recent):  Temp: 97.5 °F (36.4 °C) (04/19/17 0845)  Pulse: 89 (04/19/17 1150)  Resp: (!) 26 (04/19/17 1150)  BP: (!) 114/56 (04/19/17 0845)  SpO2: 98 % (04/19/17 1150) Vital Signs (24h Range):  Temp:  [97.5 °F (36.4 °C)-100.2 °F (37.9 °C)] 97.5 °F (36.4 °C)  Pulse:  [] 89  Resp:  [17-36] 26  SpO2:  [81 %-99 %] 98 %  BP: (100-135)/(51-73) 114/56     Weight: 75.5 kg (166 lb 7.2 oz)  Body mass index is 32.51 kg/(m^2).    Physical Exam   Constitutional: She is oriented to person, place, and time. She appears well-developed and well-nourished. No distress.   HENT:   Right Ear: External ear normal.   Left Ear: External ear normal.   Nose: Nose normal.   Eyes: EOM are normal. Pupils are equal, round, and reactive to light. No scleral icterus.   Neck: Normal range of motion. Neck supple. No thyromegaly present.   Cardiovascular: Normal rate and normal heart sounds.  Exam reveals no friction rub.    No murmur heard.  Pulmonary/Chest: She has wheezes.   Frequent coughing, difficulty speaking full sentences on room air, coarse and wheezes throughout, bronchial breath sounds over left chest    Abdominal: Soft. Bowel sounds are normal. She exhibits no mass. There is no tenderness.   No hepatosplenomegaly   Musculoskeletal: Normal range of motion. She exhibits no edema or deformity.   Neurological: She is alert and oriented to person, place, and time. No cranial nerve deficit.   Skin: Skin is warm and dry. No pallor.        Significant Labs:   CBC:   Recent Labs  Lab 04/18/17 2144 04/19/17  0459   WBC 10.08 8.37   HGB 10.4* 9.8*   HCT 31.6* 29.8*    167     CMP:   Recent Labs  Lab 04/18/17 2144 04/19/17 0458    140   K 3.3* 3.6    106   CO2 23 26   * 186*   BUN 17 13   CREATININE 0.9 0.8   CALCIUM 9.1 8.5*   PROT 6.5 6.0   ALBUMIN 2.9* 2.6*   BILITOT 0.3 0.2   ALKPHOS 102 97   AST 72* 56*   ALT 34 32   ANIONGAP 12 8   EGFRNONAA >60 >60     Lactic Acid:   Recent Labs  Lab 04/18/17  2851    LACTATE 0.8     Troponin:   Recent Labs  Lab 04/18/17  2144   TROPONINI 0.009     Significant Imaging: CXR reviewed    Assessment/Plan:     * Pneumonia of both lungs due to infectious organism  Continue moxifloxacin, steroids, and breathing treatments. Follow up BCx.    Acute respiratory failure with hypoxia  Due to pneumonia. Continue supplemental O2 and wean as tolerated.    Moderate persistent asthma  Currently with wheezes and acute exacerbation. Steroids and breathing treatments.    Tobacco abuse  Counseled on the importance of cessation. Nicotine patch if desired.    Sleep disturbance  Continue home trazodone QHS.    Bipolar 1 disorder  Continue home risperdal 2mg QHS and PRN benzodiazepine.     Anemia of other chronic disease  Stable. Normocytic.    VTE Risk Mitigation         Ordered     heparin (porcine) injection 5,000 Units  Every 8 hours     Route:  Subcutaneous        04/19/17 0248     Medium Risk of VTE  Once      04/19/17 0248        Flavia Swenson MD  Department of Hospital Medicine   [facility]

## 2017-04-19 NOTE — PROGRESS NOTES
Patient with resp of 36 and o2 sats lower 80's and upper 70's. Non rebreather applied, instructed pt to perform pursed lip breathing. Resp called to bedside for treatment. o2 sats 99%, resp at 24-26. Patient appears and states she feels very anxious. Dr. Swenson notified. New orders given. Will cont to monitor.

## 2017-04-19 NOTE — PLAN OF CARE
Problem: Patient Care Overview  Goal: Plan of Care Review  Outcome: Ongoing (interventions implemented as appropriate)  Pt is free from falls. Pt instructed to call for assistance before attempting to get out of bed and bed alarm set. Pt verbalized understanding.  AAOx4. Pt is on bedrest. Daughter at bedside. No edema noted. No breakdown noted. PIV is patent and intact. POC reviewed with patient and pt verbalized understanding. Pt oriented to room and call light. Bed in low position and siderails up x2. Pt oriented to call light. Will continue to monitor pt.

## 2017-04-20 PROBLEM — F43.20 BEREAVEMENT REACTION: Status: ACTIVE | Noted: 2017-04-20

## 2017-04-20 PROBLEM — Z63.4 BEREAVEMENT REACTION: Status: ACTIVE | Noted: 2017-04-20

## 2017-04-20 LAB
ALBUMIN SERPL BCP-MCNC: 2.6 G/DL
ALLENS TEST: ABNORMAL
ALP SERPL-CCNC: 103 U/L
ALT SERPL W/O P-5'-P-CCNC: 33 U/L
ANION GAP SERPL CALC-SCNC: 8 MMOL/L
AST SERPL-CCNC: 57 U/L
BASOPHILS # BLD AUTO: 0 K/UL
BASOPHILS NFR BLD: 0 %
BILIRUB SERPL-MCNC: 0.1 MG/DL
BUN SERPL-MCNC: 15 MG/DL
CALCIUM SERPL-MCNC: 9 MG/DL
CHLORIDE SERPL-SCNC: 105 MMOL/L
CO2 SERPL-SCNC: 30 MMOL/L
CREAT SERPL-MCNC: 0.8 MG/DL
DELSYS: ABNORMAL
DELSYS: ABNORMAL
DIFFERENTIAL METHOD: ABNORMAL
EOSINOPHIL # BLD AUTO: 0 K/UL
EOSINOPHIL NFR BLD: 0 %
EP: 6
ERYTHROCYTE [DISTWIDTH] IN BLOOD BY AUTOMATED COUNT: 13.5 %
ERYTHROCYTE [SEDIMENTATION RATE] IN BLOOD BY WESTERGREN METHOD: 16 MM/H
EST. GFR  (AFRICAN AMERICAN): >60 ML/MIN/1.73 M^2
EST. GFR  (NON AFRICAN AMERICAN): >60 ML/MIN/1.73 M^2
FIO2: 100
FIO2: 1000
FLOW: 15
GLUCOSE SERPL-MCNC: 135 MG/DL
HCO3 UR-SCNC: 29 MMOL/L (ref 24–28)
HCO3 UR-SCNC: 32.2 MMOL/L (ref 24–28)
HCT VFR BLD AUTO: 29.4 %
HGB BLD-MCNC: 9.7 G/DL
IP: 12
LYMPHOCYTES # BLD AUTO: 0.4 K/UL
LYMPHOCYTES NFR BLD: 3.3 %
MCH RBC QN AUTO: 29.5 PG
MCHC RBC AUTO-ENTMCNC: 33 %
MCV RBC AUTO: 89 FL
MIN VOL: 21.2
MODE: ABNORMAL
MODE: ABNORMAL
MONOCYTES # BLD AUTO: 0.7 K/UL
MONOCYTES NFR BLD: 5.6 %
NEUTROPHILS # BLD AUTO: 10.6 K/UL
NEUTROPHILS NFR BLD: 90.8 %
PCO2 BLDA: 57.8 MMHG (ref 35–45)
PCO2 BLDA: 58.8 MMHG (ref 35–45)
PH SMN: 7.31 [PH] (ref 7.35–7.45)
PH SMN: 7.35 [PH] (ref 7.35–7.45)
PLATELET # BLD AUTO: 194 K/UL
PMV BLD AUTO: 9.7 FL
PO2 BLDA: 200 MMHG (ref 80–100)
PO2 BLDA: 64 MMHG (ref 80–100)
POC BE: 2 MMOL/L
POC BE: 5 MMOL/L
POC SATURATED O2: 100 % (ref 95–100)
POC SATURATED O2: 89 % (ref 95–100)
POC TCO2: 31 MMOL/L (ref 23–27)
POC TCO2: 34 MMOL/L (ref 23–27)
POTASSIUM SERPL-SCNC: 3.9 MMOL/L
PROT SERPL-MCNC: 6 G/DL
RBC # BLD AUTO: 3.29 M/UL
SAMPLE: ABNORMAL
SAMPLE: ABNORMAL
SITE: ABNORMAL
SITE: ABNORMAL
SODIUM SERPL-SCNC: 143 MMOL/L
SP02: 90
SP02: 99
SPONT RATE: 30
WBC # BLD AUTO: 11.71 K/UL

## 2017-04-20 PROCEDURE — 87040 BLOOD CULTURE FOR BACTERIA: CPT

## 2017-04-20 PROCEDURE — 25000242 PHARM REV CODE 250 ALT 637 W/ HCPCS: Performed by: EMERGENCY MEDICINE

## 2017-04-20 PROCEDURE — 94640 AIRWAY INHALATION TREATMENT: CPT

## 2017-04-20 PROCEDURE — 25000003 PHARM REV CODE 250

## 2017-04-20 PROCEDURE — 25000003 PHARM REV CODE 250: Performed by: EMERGENCY MEDICINE

## 2017-04-20 PROCEDURE — 27000221 HC OXYGEN, UP TO 24 HOURS

## 2017-04-20 PROCEDURE — 63600175 PHARM REV CODE 636 W HCPCS: Performed by: EMERGENCY MEDICINE

## 2017-04-20 PROCEDURE — 80053 COMPREHEN METABOLIC PANEL: CPT

## 2017-04-20 PROCEDURE — 36415 COLL VENOUS BLD VENIPUNCTURE: CPT

## 2017-04-20 PROCEDURE — 99900035 HC TECH TIME PER 15 MIN (STAT)

## 2017-04-20 PROCEDURE — 25000003 PHARM REV CODE 250: Performed by: INTERNAL MEDICINE

## 2017-04-20 PROCEDURE — 36600 WITHDRAWAL OF ARTERIAL BLOOD: CPT

## 2017-04-20 PROCEDURE — 85025 COMPLETE CBC W/AUTO DIFF WBC: CPT

## 2017-04-20 PROCEDURE — 94660 CPAP INITIATION&MGMT: CPT

## 2017-04-20 PROCEDURE — 63600175 PHARM REV CODE 636 W HCPCS: Performed by: INTERNAL MEDICINE

## 2017-04-20 PROCEDURE — 94761 N-INVAS EAR/PLS OXIMETRY MLT: CPT

## 2017-04-20 PROCEDURE — 82803 BLOOD GASES ANY COMBINATION: CPT

## 2017-04-20 PROCEDURE — 20000000 HC ICU ROOM

## 2017-04-20 RX ORDER — OLANZAPINE 10 MG/2ML
INJECTION, POWDER, FOR SOLUTION INTRAMUSCULAR
Status: COMPLETED
Start: 2017-04-20 | End: 2017-04-20

## 2017-04-20 RX ORDER — FUROSEMIDE 10 MG/ML
20 INJECTION INTRAMUSCULAR; INTRAVENOUS ONCE
Status: COMPLETED | OUTPATIENT
Start: 2017-04-20 | End: 2017-04-20

## 2017-04-20 RX ORDER — OLANZAPINE 10 MG/2ML
2.5 INJECTION, POWDER, FOR SOLUTION INTRAMUSCULAR EVERY 8 HOURS PRN
Status: DISCONTINUED | OUTPATIENT
Start: 2017-04-20 | End: 2017-04-27 | Stop reason: HOSPADM

## 2017-04-20 RX ORDER — LORAZEPAM 0.5 MG/1
1 TABLET ORAL EVERY 6 HOURS PRN
Status: DISCONTINUED | OUTPATIENT
Start: 2017-04-20 | End: 2017-04-21

## 2017-04-20 RX ORDER — LORAZEPAM 2 MG/ML
1 INJECTION INTRAMUSCULAR ONCE
Status: COMPLETED | OUTPATIENT
Start: 2017-04-20 | End: 2017-04-20

## 2017-04-20 RX ORDER — PANTOPRAZOLE SODIUM 40 MG/10ML
40 INJECTION, POWDER, LYOPHILIZED, FOR SOLUTION INTRAVENOUS DAILY
Status: DISCONTINUED | OUTPATIENT
Start: 2017-04-21 | End: 2017-04-24

## 2017-04-20 RX ADMIN — METHYLPREDNISOLONE SODIUM SUCCINATE 80 MG: 125 INJECTION, POWDER, FOR SOLUTION INTRAMUSCULAR; INTRAVENOUS at 10:04

## 2017-04-20 RX ADMIN — METHYLPREDNISOLONE SODIUM SUCCINATE 80 MG: 125 INJECTION, POWDER, FOR SOLUTION INTRAMUSCULAR; INTRAVENOUS at 02:04

## 2017-04-20 RX ADMIN — NICOTINE 1 PATCH: 21 PATCH, EXTENDED RELEASE TRANSDERMAL at 10:04

## 2017-04-20 RX ADMIN — RISPERIDONE 2 MG: 1 TABLET ORAL at 08:04

## 2017-04-20 RX ADMIN — DOCUSATE SODIUM AND SENNOSIDES 1 TABLET: 8.6; 5 TABLET, FILM COATED ORAL at 08:04

## 2017-04-20 RX ADMIN — IPRATROPIUM BROMIDE AND ALBUTEROL SULFATE 3 ML: .5; 3 SOLUTION RESPIRATORY (INHALATION) at 06:04

## 2017-04-20 RX ADMIN — PROMETHAZINE HYDROCHLORIDE AND CODEINE PHOSPHATE 5 ML: 6.25; 1 SYRUP ORAL at 04:04

## 2017-04-20 RX ADMIN — LORAZEPAM 1 MG: 0.5 TABLET ORAL at 08:04

## 2017-04-20 RX ADMIN — OLANZAPINE 2.5 MG: 10 INJECTION, POWDER, LYOPHILIZED, FOR SOLUTION INTRAMUSCULAR at 05:04

## 2017-04-20 RX ADMIN — IPRATROPIUM BROMIDE AND ALBUTEROL SULFATE 3 ML: .5; 3 SOLUTION RESPIRATORY (INHALATION) at 11:04

## 2017-04-20 RX ADMIN — IPRATROPIUM BROMIDE AND ALBUTEROL SULFATE 3 ML: .5; 3 SOLUTION RESPIRATORY (INHALATION) at 07:04

## 2017-04-20 RX ADMIN — HEPARIN SODIUM 5000 UNITS: 5000 INJECTION, SOLUTION INTRAVENOUS; SUBCUTANEOUS at 06:04

## 2017-04-20 RX ADMIN — LORAZEPAM 0.5 MG: 0.5 TABLET ORAL at 04:04

## 2017-04-20 RX ADMIN — IPRATROPIUM BROMIDE AND ALBUTEROL SULFATE 3 ML: .5; 3 SOLUTION RESPIRATORY (INHALATION) at 12:04

## 2017-04-20 RX ADMIN — METHYLPREDNISOLONE SODIUM SUCCINATE 80 MG: 125 INJECTION, POWDER, FOR SOLUTION INTRAMUSCULAR; INTRAVENOUS at 06:04

## 2017-04-20 RX ADMIN — FLUTICASONE FUROATE AND VILANTEROL TRIFENATATE 1 PUFF: 100; 25 POWDER RESPIRATORY (INHALATION) at 07:04

## 2017-04-20 RX ADMIN — LORAZEPAM 1 MG: 2 INJECTION, SOLUTION INTRAMUSCULAR; INTRAVENOUS at 10:04

## 2017-04-20 RX ADMIN — Medication 3 ML: at 06:04

## 2017-04-20 RX ADMIN — Medication 3 ML: at 02:04

## 2017-04-20 RX ADMIN — MOXIFLOXACIN HYDROCHLORIDE 400 MG: 400 INJECTION, SOLUTION INTRAVENOUS at 11:04

## 2017-04-20 RX ADMIN — IPRATROPIUM BROMIDE AND ALBUTEROL SULFATE 3 ML: .5; 3 SOLUTION RESPIRATORY (INHALATION) at 03:04

## 2017-04-20 RX ADMIN — DOCUSATE SODIUM AND SENNOSIDES 1 TABLET: 8.6; 5 TABLET, FILM COATED ORAL at 10:04

## 2017-04-20 RX ADMIN — HEPARIN SODIUM 5000 UNITS: 5000 INJECTION, SOLUTION INTRAVENOUS; SUBCUTANEOUS at 09:04

## 2017-04-20 RX ADMIN — OXYCODONE HYDROCHLORIDE AND ACETAMINOPHEN 1 TABLET: 5; 325 TABLET ORAL at 08:04

## 2017-04-20 RX ADMIN — Medication 3 ML: at 10:04

## 2017-04-20 RX ADMIN — OLANZAPINE 2.5 MG: 10 INJECTION, POWDER, LYOPHILIZED, FOR SOLUTION INTRAMUSCULAR at 04:04

## 2017-04-20 RX ADMIN — HEPARIN SODIUM 5000 UNITS: 5000 INJECTION, SOLUTION INTRAVENOUS; SUBCUTANEOUS at 02:04

## 2017-04-20 RX ADMIN — FUROSEMIDE 20 MG: 10 INJECTION, SOLUTION INTRAMUSCULAR; INTRAVENOUS at 11:04

## 2017-04-20 RX ADMIN — TRAZODONE HYDROCHLORIDE 50 MG: 50 TABLET ORAL at 08:04

## 2017-04-20 RX ADMIN — AMANTADINE HYDROCHLORIDE 100 MG: 100 CAPSULE ORAL at 11:04

## 2017-04-20 RX ADMIN — LORAZEPAM 1 MG: 0.5 TABLET ORAL at 01:04

## 2017-04-20 RX ADMIN — AMANTADINE HYDROCHLORIDE 100 MG: 100 CAPSULE ORAL at 08:04

## 2017-04-20 RX ADMIN — IPRATROPIUM BROMIDE AND ALBUTEROL SULFATE 3 ML: .5; 3 SOLUTION RESPIRATORY (INHALATION) at 04:04

## 2017-04-20 NOTE — PROGRESS NOTES
0910- Pt increasing agitated. States MD Swenson promised her an increased dose of ativan upon transfer to ICU. MD Swenson paged.  0915- Pt agitated taking off Bipap, heart monitor, BP cuff. Pt yelling and stating she wants to speak to MD Swenson. She states she is leaving ama. Stats 75%. Awaiting return call. MD paged x2.    0918- Pt unable to calm down RR 40-45 sats remain at 75%. -150s. 3 RNs at bedside with pt. MD paged overhead.   0992- MD Swenson returned page. Orders received and carried out.   1003- One-time dose of ativan 1mg given  1015- Pt now resting RR 20s. sats 100% on Bipap. HR 100s.

## 2017-04-20 NOTE — PLAN OF CARE
Problem: Patient Care Overview  Goal: Plan of Care Review  Outcome: Ongoing (interventions implemented as appropriate)  Pt to ICU for resp distress and increased agitation and non compliance. Pt placed on bipap sats % while bipap in place. Pt frequently removing bipap when agitated. Desat to 75%. Prn ativan given q6h for agitation. Later in shift pt started having visual hallucinations and increased agitation. MD made aware, PRN Zyprexa and bilat wrist restraints ordered. Psych consulted for possible withdraw. Family also states that pt abruptly stopped taking risperidone. Family updated on poc. No falls injuries or breakdown this shift.

## 2017-04-20 NOTE — PLAN OF CARE
04/20/17 1633   Discharge Assessment   Assessment Type Discharge Planning Assessment   Confirmed/corrected address and phone number on facesheet? Yes   Assessment information obtained from? Caregiver  (Yuliet bourgeois via phone)   Expected Length of Stay (days) 5   Prior to hospitilization cognitive status: Alert/Oriented   Prior to hospitalization functional status: Independent  (independent with ADL's)   Current cognitive status: (Asleep with full face mask O2 in place)   Current Functional Status: Needs Assistance  (in ICU with lines, tubes, etc)   Arrived From home or self-care   Lives With child(betsy), adult  (Lives with daughter Yuliet)   Able to Return to Prior Arrangements yes   Is patient able to care for self after discharge? Unable to determine at this time (comments)   Who are your caregiver(s) and their phone number(s)? Daughter able to Help patient at home    Patient's perception of discharge disposition (TBD)   Readmission Within The Last 30 Days no previous admission in last 30 days   Patient currently being followed by outpatient case management? No   Patient currently receives home health services? No   Equipment Currently Used at Home (unable to determined)   Discharge Plan A (TBD)   Discharge Plan B (TBD)   Patient/Family In Agreement With Plan other (see comments)  (see note)   Unable to complete assessment.  No family at bedside.  Patient unable to participate in assessment at this time.  TN called daughterYuliet who patient lives with [phone].  Call dropped prior to completion of assessment.  TN attempted to call back but received voice mail.  TN/PERNELL to follow up as able.

## 2017-04-20 NOTE — PROGRESS NOTES
[facility] Hospital Medicine  Progress Note    Patient Name: Alina Menchaca  MRN: [id number]  Patient Class: IP- Inpatient   Admission Date: 4/18/2017  Length of Stay: 1 days  Attending Physician: Flavia Swenson MD  Primary Care Provider: Meghan Hanna MD        Subjective:     Principal Problem:Acute respiratory failure with hypoxia and hypercapnia    HPI:  Ms. Menchaca is a 47 yo woman with asthma, h/o seizures, anxiety and bipolar disorder who presented for cough and shortness of breath. Four days ago she started having shortness of breath and cough. Her coughing was so severe that she has not been able to sleep. She presented to the ER when her son noted her lips to be blue and checked her pulse ox which read in the 70s. She also reports fevers, wheezing, and pleuritic chest pain. She did drive to [place] 4/12/2017 and returned the day of presentation. Her mother passed away the day prior to presentation. In December, she was admitted for pneumonia and had to be intubated. She had since been doing well and does not require supplemental oxygen at home.    Hospital Course:  She was admitted for community-acquired pneumonia/asthma exacerbation and placed on moxifloxacin, methylprednisolone, and neb treatments. She was initially on BiPAP upon EMS arrival but was able to be weaned to nasal cannula in the ER without much difficulty. She night following admission her respiratory status worsened. She was noted to have increased infiltrates on CXR and an ABG of 7.3 pCO2 57 and pO2 of 64 on 100% non-rebreather. She was placed on rescue BiPAP and transferred to the ICU. Of note, she usually uses CPAP at home while sleeping but did not use one that night. She is also extremely anxious because of her mother's recent death. She improved with ativan and BiPAP. She was also given lasix 20mg IV due to the increased infiltrates on CXR.    Review of Systems   Constitutional: Positive for diaphoresis and  fever.   HENT: Negative for congestion and sore throat.    Eyes: Negative for photophobia and visual disturbance.   Respiratory: Positive for cough, shortness of breath and wheezing.    Cardiovascular: Positive for chest pain. Negative for leg swelling.   Gastrointestinal: Negative for abdominal distention and abdominal pain.   Musculoskeletal: Negative for gait problem and joint swelling.   Skin: Negative for pallor and rash.   Neurological: Negative for seizures and light-headedness.   Psychiatric/Behavioral: Positive for dysphoric mood. The patient is nervous/anxious.      Objective:     Vital Signs (Most Recent):  Temp: 96.5 °F (35.8 °C) (04/20/17 0800)  Pulse: (!) 119 (04/20/17 0800)  Resp: (!) 21 (04/20/17 0800)  BP: 118/61 (04/20/17 0800)  SpO2: 97 % (04/20/17 0800) Vital Signs (24h Range):  Temp:  [96.5 °F (35.8 °C)-99.3 °F (37.4 °C)] 96.5 °F (35.8 °C)  Pulse:  [] 119  Resp:  [20-48] 21  SpO2:  [85 %-99 %] 97 %  BP: ()/(55-66) 118/61     Weight: 75.5 kg (166 lb 7.2 oz)  Body mass index is 32.51 kg/(m^2).    Intake/Output Summary (Last 24 hours) at 04/20/17 1047  Last data filed at 04/19/17 1800   Gross per 24 hour   Intake              360 ml   Output                0 ml   Net              360 ml      Physical Exam   Constitutional: She is oriented to person, place, and time. She appears well-developed and well-nourished. No distress.   Emotional and tearful over her mother's passing and her not feeling well.   HENT:   Right Ear: External ear normal.   Left Ear: External ear normal.   Nose: Nose normal.   Eyes: EOM are normal. Pupils are equal, round, and reactive to light. No scleral icterus.   Neck: Normal range of motion. Neck supple. No thyromegaly present.   Cardiovascular: Normal rate and normal heart sounds.  Exam reveals no friction rub.    No murmur heard.  Pulmonary/Chest: She has wheezes.   Coughing, coarse and wheezes throughout, increased work of breathing on NRB   Abdominal: Soft.  Bowel sounds are normal. She exhibits no mass. There is no tenderness.   No hepatosplenomegaly   Musculoskeletal: Normal range of motion. She exhibits no edema or deformity.   Neurological: She is alert and oriented to person, place, and time. No cranial nerve deficit.   Skin: Skin is warm and dry. No pallor.       Significant Labs:   Blood Culture:   Recent Labs  Lab 04/18/17  2240 04/18/17  2255   LABBLOO No Growth to date  No Growth to date No Growth to date  No Growth to date     CBC:   Recent Labs  Lab 04/18/17 2144 04/19/17 0459 04/20/17  0432   WBC 10.08 8.37 11.71   HGB 10.4* 9.8* 9.7*   HCT 31.6* 29.8* 29.4*    167 194     CMP:   Recent Labs  Lab 04/18/17 2144 04/19/17 0458 04/20/17  0432    140 143   K 3.3* 3.6 3.9    106 105   CO2 23 26 30*   * 186* 135*   BUN 17 13 15   CREATININE 0.9 0.8 0.8   CALCIUM 9.1 8.5* 9.0   PROT 6.5 6.0 6.0   ALBUMIN 2.9* 2.6* 2.6*   BILITOT 0.3 0.2 0.1   ALKPHOS 102 97 103   AST 72* 56* 57*   ALT 34 32 33   ANIONGAP 12 8 8   EGFRNONAA >60 >60 >60     Lactic Acid:   Recent Labs  Lab 04/18/17 2240   LACTATE 0.8     Troponin:   Recent Labs  Lab 04/18/17 2144   TROPONINI 0.009       Significant Imaging: cxrs reviewed    Assessment/Plan:      * Acute respiratory failure with hypoxia and hypercapnia  Due to pneumonia, asthma exacerbation, possibly developing ARDS. Cont abx, steroids, and nebs. Diurese w/ Lasix 20mg IV x1 and monitor. CXR tomorrow morning. Continue supplemental O2 increased to BiPAP. Monitor ABG and wean when stable.    Pneumonia of both lungs due to infectious organism  Continue moxifloxacin, steroids, and breathing treatments. BCx NGTD.    Asthma exacerbation  As above    Moderate persistent asthma  Currently with wheezes and acute exacerbation. Steroids and breathing treatments as above.    Tobacco abuse  Counseled on the importance of cessation. Nicotine patch.    Sleep disturbance  Continue home trazodone QHS.    Bipolar 1  disorder  Continue home risperdal 2mg QHS and PRN benzodiazepine.     Anemia of other chronic disease  Stable. Normocytic.    Bereavement reaction  Supportive care. I feel her anxiety and sorrow are attributing to her perceived symptoms however she clearly objective signs of acute respiratory failure.    VTE Risk Mitigation         Ordered     heparin (porcine) injection 5,000 Units  Every 8 hours     Route:  Subcutaneous        04/19/17 0248     Medium Risk of VTE  Once      04/19/17 0248          Flavia Swenson MD  Department of Hospital Medicine   [facility]    Given her agitation and anxiety there was concern for withdraw. The patient's visitor disclosewd to nursing that the patient has been taking Suboxone which is not prescribed to her. Psychiatry was consulted to help differentiate between possible anxiety vs bereavement vs withdraw.

## 2017-04-20 NOTE — SUBJECTIVE & OBJECTIVE
Review of Systems   Constitutional: Positive for diaphoresis and fever.   HENT: Negative for congestion and sore throat.    Eyes: Negative for photophobia and visual disturbance.   Respiratory: Positive for cough, shortness of breath and wheezing.    Cardiovascular: Positive for chest pain. Negative for leg swelling.   Gastrointestinal: Negative for abdominal distention and abdominal pain.   Musculoskeletal: Negative for gait problem and joint swelling.   Skin: Negative for pallor and rash.   Neurological: Negative for seizures and light-headedness.   Psychiatric/Behavioral: Positive for dysphoric mood. The patient is nervous/anxious.      Objective:     Vital Signs (Most Recent):  Temp: 96.5 °F (35.8 °C) (04/20/17 0800)  Pulse: (!) 119 (04/20/17 0800)  Resp: (!) 21 (04/20/17 0800)  BP: 118/61 (04/20/17 0800)  SpO2: 97 % (04/20/17 0800) Vital Signs (24h Range):  Temp:  [96.5 °F (35.8 °C)-99.3 °F (37.4 °C)] 96.5 °F (35.8 °C)  Pulse:  [] 119  Resp:  [20-48] 21  SpO2:  [85 %-99 %] 97 %  BP: ()/(55-66) 118/61     Weight: 75.5 kg (166 lb 7.2 oz)  Body mass index is 32.51 kg/(m^2).    Intake/Output Summary (Last 24 hours) at 04/20/17 1047  Last data filed at 04/19/17 1800   Gross per 24 hour   Intake              360 ml   Output                0 ml   Net              360 ml      Physical Exam   Constitutional: She is oriented to person, place, and time. She appears well-developed and well-nourished. No distress.   Emotional and tearful over her mother's passing and her not feeling well.   HENT:   Right Ear: External ear normal.   Left Ear: External ear normal.   Nose: Nose normal.   Eyes: EOM are normal. Pupils are equal, round, and reactive to light. No scleral icterus.   Neck: Normal range of motion. Neck supple. No thyromegaly present.   Cardiovascular: Normal rate and normal heart sounds.  Exam reveals no friction rub.    No murmur heard.  Pulmonary/Chest: She has wheezes.   Coughing, coarse and wheezes  throughout, increased work of breathing on NRB   Abdominal: Soft. Bowel sounds are normal. She exhibits no mass. There is no tenderness.   No hepatosplenomegaly   Musculoskeletal: Normal range of motion. She exhibits no edema or deformity.   Neurological: She is alert and oriented to person, place, and time. No cranial nerve deficit.   Skin: Skin is warm and dry. No pallor.       Significant Labs:   Blood Culture:   Recent Labs  Lab 04/18/17 2240 04/18/17  2255   LABBLOO No Growth to date  No Growth to date No Growth to date  No Growth to date     CBC:   Recent Labs  Lab 04/18/17 2144 04/19/17 0459 04/20/17  0432   WBC 10.08 8.37 11.71   HGB 10.4* 9.8* 9.7*   HCT 31.6* 29.8* 29.4*    167 194     CMP:   Recent Labs  Lab 04/18/17 2144 04/19/17 0458 04/20/17  0432    140 143   K 3.3* 3.6 3.9    106 105   CO2 23 26 30*   * 186* 135*   BUN 17 13 15   CREATININE 0.9 0.8 0.8   CALCIUM 9.1 8.5* 9.0   PROT 6.5 6.0 6.0   ALBUMIN 2.9* 2.6* 2.6*   BILITOT 0.3 0.2 0.1   ALKPHOS 102 97 103   AST 72* 56* 57*   ALT 34 32 33   ANIONGAP 12 8 8   EGFRNONAA >60 >60 >60     Lactic Acid:   Recent Labs  Lab 04/18/17 2240   LACTATE 0.8     Troponin:   Recent Labs  Lab 04/18/17 2144   TROPONINI 0.009       Significant Imaging: cxrs reviewed

## 2017-04-20 NOTE — ASSESSMENT & PLAN NOTE
Due to pneumonia, asthma exacerbation, possibly developing ARDS. Cont abx, steroids, and nebs. Diurese w/ Lasix 20mg IV x1 and monitor. CXR tomorrow morning. Continue supplemental O2 increased to BiPAP. Monitor ABG and wean when stable.

## 2017-04-20 NOTE — PROGRESS NOTES
1621- Pt states she abruptly stopped taking her risperadone last  because she and her family noticed her hands shaking while at her mother's  services. Her and her family attributed this shaking to an adverse reaction to risperadone.

## 2017-04-20 NOTE — NURSING
Pt has had difficulty off and on throughout the night. Pt left floor at one time to go smoke. On return pt had a hard time recovering. Pt was asked repeatedly to remain in bed but was noncompliant. Pt started having increased difficulty breathing around 610 am. Md was paged. MD returned page at 630 where orders were obtained and carried out. Pt continued to struggle. Order was obtained to transfer to ICU at 753am Pt transferred 802 am,

## 2017-04-20 NOTE — ASSESSMENT & PLAN NOTE
Supportive care. I feel her anxiety and sorrow are attributing to her perceived symptoms however she clearly objective signs of acute respiratory failure.

## 2017-04-20 NOTE — PLAN OF CARE
SOB & Tachycardia - Dr. Swenson notified of patient with elevated heart rate and pulse ox of 90% on NR mask. Awaiting MD's arrival as instructed. Will monitor.

## 2017-04-21 PROBLEM — F19.939: Status: ACTIVE | Noted: 2017-04-21

## 2017-04-21 PROBLEM — F11.20 OPIOID USE DISORDER, SEVERE, DEPENDENCE: Status: ACTIVE | Noted: 2017-04-21

## 2017-04-21 LAB
ALBUMIN SERPL BCP-MCNC: 2.6 G/DL
ALP SERPL-CCNC: 106 U/L
ALT SERPL W/O P-5'-P-CCNC: 33 U/L
ANION GAP SERPL CALC-SCNC: 10 MMOL/L
AST SERPL-CCNC: 45 U/L
BASOPHILS # BLD AUTO: 0 K/UL
BASOPHILS NFR BLD: 0 %
BILIRUB SERPL-MCNC: 0.3 MG/DL
BUN SERPL-MCNC: 18 MG/DL
CALCIUM SERPL-MCNC: 9.1 MG/DL
CHLORIDE SERPL-SCNC: 103 MMOL/L
CO2 SERPL-SCNC: 31 MMOL/L
CREAT SERPL-MCNC: 0.7 MG/DL
DIFFERENTIAL METHOD: ABNORMAL
EOSINOPHIL # BLD AUTO: 0 K/UL
EOSINOPHIL NFR BLD: 0 %
ERYTHROCYTE [DISTWIDTH] IN BLOOD BY AUTOMATED COUNT: 13.7 %
EST. GFR  (AFRICAN AMERICAN): >60 ML/MIN/1.73 M^2
EST. GFR  (NON AFRICAN AMERICAN): >60 ML/MIN/1.73 M^2
GLUCOSE SERPL-MCNC: 117 MG/DL
HCT VFR BLD AUTO: 29.6 %
HGB BLD-MCNC: 9.6 G/DL
LYMPHOCYTES # BLD AUTO: 0.3 K/UL
LYMPHOCYTES NFR BLD: 2.9 %
MCH RBC QN AUTO: 29 PG
MCHC RBC AUTO-ENTMCNC: 32.4 %
MCV RBC AUTO: 89 FL
MONOCYTES # BLD AUTO: 0.7 K/UL
MONOCYTES NFR BLD: 5.9 %
NEUTROPHILS # BLD AUTO: 10.1 K/UL
NEUTROPHILS NFR BLD: 90.8 %
PLATELET # BLD AUTO: 220 K/UL
PMV BLD AUTO: 8.9 FL
POTASSIUM SERPL-SCNC: 4.1 MMOL/L
PROT SERPL-MCNC: 5.9 G/DL
RBC # BLD AUTO: 3.31 M/UL
SODIUM SERPL-SCNC: 144 MMOL/L
WBC # BLD AUTO: 11.15 K/UL

## 2017-04-21 PROCEDURE — 80053 COMPREHEN METABOLIC PANEL: CPT

## 2017-04-21 PROCEDURE — 94660 CPAP INITIATION&MGMT: CPT

## 2017-04-21 PROCEDURE — 94761 N-INVAS EAR/PLS OXIMETRY MLT: CPT

## 2017-04-21 PROCEDURE — C9113 INJ PANTOPRAZOLE SODIUM, VIA: HCPCS | Performed by: INTERNAL MEDICINE

## 2017-04-21 PROCEDURE — 25000003 PHARM REV CODE 250: Performed by: INTERNAL MEDICINE

## 2017-04-21 PROCEDURE — 25000003 PHARM REV CODE 250: Performed by: HOSPITALIST

## 2017-04-21 PROCEDURE — 36415 COLL VENOUS BLD VENIPUNCTURE: CPT

## 2017-04-21 PROCEDURE — 27100171 HC OXYGEN HIGH FLOW UP TO 24 HOURS

## 2017-04-21 PROCEDURE — 85025 COMPLETE CBC W/AUTO DIFF WBC: CPT

## 2017-04-21 PROCEDURE — 25000003 PHARM REV CODE 250: Performed by: EMERGENCY MEDICINE

## 2017-04-21 PROCEDURE — 20000000 HC ICU ROOM

## 2017-04-21 PROCEDURE — 63600175 PHARM REV CODE 636 W HCPCS: Performed by: INTERNAL MEDICINE

## 2017-04-21 PROCEDURE — 99900035 HC TECH TIME PER 15 MIN (STAT)

## 2017-04-21 PROCEDURE — 90792 PSYCH DIAG EVAL W/MED SRVCS: CPT | Mod: ,,, | Performed by: PSYCHIATRY & NEUROLOGY

## 2017-04-21 PROCEDURE — 27000221 HC OXYGEN, UP TO 24 HOURS

## 2017-04-21 PROCEDURE — 25000242 PHARM REV CODE 250 ALT 637 W/ HCPCS: Performed by: EMERGENCY MEDICINE

## 2017-04-21 PROCEDURE — 63600175 PHARM REV CODE 636 W HCPCS: Performed by: EMERGENCY MEDICINE

## 2017-04-21 PROCEDURE — 94640 AIRWAY INHALATION TREATMENT: CPT

## 2017-04-21 RX ORDER — DULOXETIN HYDROCHLORIDE 30 MG/1
30 CAPSULE, DELAYED RELEASE ORAL DAILY
Status: DISCONTINUED | OUTPATIENT
Start: 2017-04-21 | End: 2017-04-27 | Stop reason: HOSPADM

## 2017-04-21 RX ORDER — DIVALPROEX SODIUM 250 MG/1
250 TABLET, DELAYED RELEASE ORAL EVERY 8 HOURS
Status: DISCONTINUED | OUTPATIENT
Start: 2017-04-21 | End: 2017-04-27 | Stop reason: HOSPADM

## 2017-04-21 RX ORDER — TRAZODONE HYDROCHLORIDE 50 MG/1
100 TABLET ORAL NIGHTLY
Status: DISCONTINUED | OUTPATIENT
Start: 2017-04-21 | End: 2017-04-24

## 2017-04-21 RX ORDER — ENOXAPARIN SODIUM 100 MG/ML
40 INJECTION SUBCUTANEOUS EVERY 24 HOURS
Status: DISCONTINUED | OUTPATIENT
Start: 2017-04-21 | End: 2017-04-26

## 2017-04-21 RX ADMIN — IPRATROPIUM BROMIDE AND ALBUTEROL SULFATE 3 ML: .5; 3 SOLUTION RESPIRATORY (INHALATION) at 11:04

## 2017-04-21 RX ADMIN — HEPARIN SODIUM 5000 UNITS: 5000 INJECTION, SOLUTION INTRAVENOUS; SUBCUTANEOUS at 05:04

## 2017-04-21 RX ADMIN — DOCUSATE SODIUM AND SENNOSIDES 1 TABLET: 8.6; 5 TABLET, FILM COATED ORAL at 08:04

## 2017-04-21 RX ADMIN — IPRATROPIUM BROMIDE AND ALBUTEROL SULFATE 3 ML: .5; 3 SOLUTION RESPIRATORY (INHALATION) at 07:04

## 2017-04-21 RX ADMIN — DIVALPROEX SODIUM 250 MG: 250 TABLET, DELAYED RELEASE ORAL at 09:04

## 2017-04-21 RX ADMIN — OXYCODONE HYDROCHLORIDE AND ACETAMINOPHEN 1 TABLET: 5; 325 TABLET ORAL at 05:04

## 2017-04-21 RX ADMIN — PANTOPRAZOLE SODIUM 40 MG: 40 INJECTION, POWDER, FOR SOLUTION INTRAVENOUS at 08:04

## 2017-04-21 RX ADMIN — TRAZODONE HYDROCHLORIDE 100 MG: 50 TABLET ORAL at 09:04

## 2017-04-21 RX ADMIN — AMANTADINE HYDROCHLORIDE 100 MG: 100 CAPSULE ORAL at 08:04

## 2017-04-21 RX ADMIN — LORAZEPAM 1 MG: 0.5 TABLET ORAL at 08:04

## 2017-04-21 RX ADMIN — IPRATROPIUM BROMIDE AND ALBUTEROL SULFATE 3 ML: .5; 3 SOLUTION RESPIRATORY (INHALATION) at 04:04

## 2017-04-21 RX ADMIN — OXYCODONE HYDROCHLORIDE AND ACETAMINOPHEN 1 TABLET: 5; 325 TABLET ORAL at 01:04

## 2017-04-21 RX ADMIN — DULOXETINE 30 MG: 30 CAPSULE, DELAYED RELEASE ORAL at 05:04

## 2017-04-21 RX ADMIN — DIVALPROEX SODIUM 250 MG: 250 TABLET, DELAYED RELEASE ORAL at 05:04

## 2017-04-21 RX ADMIN — Medication 3 ML: at 02:04

## 2017-04-21 RX ADMIN — METHYLPREDNISOLONE SODIUM SUCCINATE 80 MG: 125 INJECTION, POWDER, FOR SOLUTION INTRAMUSCULAR; INTRAVENOUS at 09:04

## 2017-04-21 RX ADMIN — HEPARIN SODIUM 5000 UNITS: 5000 INJECTION, SOLUTION INTRAVENOUS; SUBCUTANEOUS at 02:04

## 2017-04-21 RX ADMIN — Medication 3 ML: at 06:04

## 2017-04-21 RX ADMIN — IPRATROPIUM BROMIDE AND ALBUTEROL SULFATE 3 ML: .5; 3 SOLUTION RESPIRATORY (INHALATION) at 12:04

## 2017-04-21 RX ADMIN — LORAZEPAM 1 MG: 0.5 TABLET ORAL at 02:04

## 2017-04-21 RX ADMIN — METHYLPREDNISOLONE SODIUM SUCCINATE 80 MG: 125 INJECTION, POWDER, FOR SOLUTION INTRAMUSCULAR; INTRAVENOUS at 01:04

## 2017-04-21 RX ADMIN — AMANTADINE HYDROCHLORIDE 100 MG: 100 CAPSULE ORAL at 09:04

## 2017-04-21 RX ADMIN — METHYLPREDNISOLONE SODIUM SUCCINATE 80 MG: 125 INJECTION, POWDER, FOR SOLUTION INTRAMUSCULAR; INTRAVENOUS at 05:04

## 2017-04-21 RX ADMIN — NICOTINE 1 PATCH: 21 PATCH, EXTENDED RELEASE TRANSDERMAL at 08:04

## 2017-04-21 RX ADMIN — OXYCODONE HYDROCHLORIDE AND ACETAMINOPHEN 1 TABLET: 5; 325 TABLET ORAL at 10:04

## 2017-04-21 RX ADMIN — Medication 3 ML: at 10:04

## 2017-04-21 RX ADMIN — PIPERACILLIN AND TAZOBACTAM 4.5 G: 4; .5 INJECTION, POWDER, LYOPHILIZED, FOR SOLUTION INTRAVENOUS; PARENTERAL at 08:04

## 2017-04-21 RX ADMIN — OLANZAPINE 2.5 MG: 10 INJECTION, POWDER, LYOPHILIZED, FOR SOLUTION INTRAMUSCULAR at 05:04

## 2017-04-21 RX ADMIN — IPRATROPIUM BROMIDE AND ALBUTEROL SULFATE 3 ML: .5; 3 SOLUTION RESPIRATORY (INHALATION) at 03:04

## 2017-04-21 NOTE — ASSESSMENT & PLAN NOTE
Due to pneumonia, asthma exacerbation.  Anxiety also probably contributing to dyspnea.  Cont abx, steroids, and nebs.   Currently on high flow NC.  Will get Pulmonary input in AM.

## 2017-04-21 NOTE — PROGRESS NOTES
"Cross-Cover Progress Note    I was called to Ms. Alina Menchaca's bedside by her nurse because she was requesting intubation.  She says, "I don't want to be intubated, but I need it!"      On my examination, she was tachypneic and mildly tachycardic.  She was with increased work of breathing.  Her pulmonary exam was significant for bibasilar crackles and faint end-expiratory wheezing.  She was alert and completely oriented.    She refused an ABG to evaluate for her ventilation and oxygenation because "they already stuck me a bunch of times!"  She is extremely anxious on BPAP.  I informed her that we need an ABG to evaluate her respiratory status, and as I was trying to explain to her that I wanted to differentiate between respiratory failure and anxiety, she demanded that I get out of her room.    I believe at this time she is protecting her airway.  Will defer endotracheal intubation at this time.    I will continue monitor the patient's progress throughout night.          Total time spent on case: 15 minutes.          Ghada Cali M.D.  Staff Nocturnist  Department of Hospital Medicine  [facility]  Pager: [phone]    "

## 2017-04-21 NOTE — ASSESSMENT & PLAN NOTE
Continue moxifloxacin, steroids, and breathing treatments.   Probably bacterial, but unable to determine.  Would broaden ABx's coverage if no improvement by tomorrow.

## 2017-04-21 NOTE — PLAN OF CARE
"Problem: Patient Care Overview  Goal: Plan of Care Review  Outcome: Ongoing (interventions implemented as appropriate)  Pt remains agitated. Pulling of bipap and high flow nasal cannula. Currently on high flow nc. Pt pulled out multiple pivs this shift L upper arm 18G remains in place. At end of shift she became agitated because she felt like she wasn't "getting enough air" so she pressed the code blue button in her room. Pt tearful when nurses entered room and when asked not to press the code blue button she yelled at the RN and told her to leave the room. PRN ativan d/c. PRN zyprexa 2.5 mg given with minimal relief of symptoms. Pt frequently request prn meds. Family/friends at bedside. POC reviewed.        "

## 2017-04-21 NOTE — PROGRESS NOTES
[facility] Hospital Medicine  Progress Note    Patient Name: Alina Menchaca  MRN: [id number]  Patient Class: IP- Inpatient   Admission Date: 4/18/2017  Length of Stay: 2 days  Attending Physician: Arnel Adames MD  Primary Care Provider: Meghan Hanna MD        Subjective:     Principal Problem:Acute respiratory failure with hypoxia and hypercapnia    HPI:  Ms. Menchaca is a 45 yo woman with asthma, h/o seizures, anxiety and bipolar disorder who presented for cough and shortness of breath. Four days ago she started having shortness of breath and cough. Her coughing was so severe that she has not been able to sleep. She presented to the ER when her son noted her lips to be blue and checked her pulse ox which read in the 70s. She also reports fevers, wheezing, and pleuritic chest pain. She did drive to [place] 4/12/2017 and returned the day of presentation. Her mother passed away the day prior to presentation. In December, she was admitted for pneumonia and had to be intubated. She had since been doing well and does not require supplemental oxygen at home.    Hospital Course:  She was admitted for community-acquired pneumonia/asthma exacerbation and placed on moxifloxacin, methylprednisolone, and neb treatments. She was initially on BiPAP upon EMS arrival but was able to be weaned to nasal cannula in the ER without much difficulty. The night following admission her respiratory status worsened. She was noted to have increased infiltrates on CXR and an ABG of 7.3 pCO2 57 and pO2 of 64 on 100% non-rebreather. She was placed on rescue BiPAP and transferred to the ICU. Of note, she usually uses CPAP at home while sleeping but did not use one that night. She is also extremely anxious because of her mother's recent death. She improved with ativan and BiPAP. She was also given lasix 20mg IV due to the increased infiltrates on CXR.  4/21: Nocturnist called to evaluate patient last night because of  increased dyspnea.  Patient was stating that she needed to be intubated.  She refused ABG's.  Currently in ICU on high flow NC.      Interval History: Complained of increased SOB overnight and wanting to be intubated.  Refused ABG's.  Very anxious and tearful.    Review of Systems   HENT: Negative for ear discharge and ear pain.    Eyes: Negative for pain and redness.   Endocrine: Negative for polyphagia and polyuria.   Genitourinary: Negative for difficulty urinating and dysuria.     Objective:     Vital Signs (Most Recent):  Temp: 97.7 °F (36.5 °C) (04/21/17 1102)  Pulse: 91 (04/21/17 1523)  Resp: (!) 36 (04/21/17 1523)  BP: 139/82 (04/21/17 1300)  SpO2: (!) 94 % (04/21/17 1523) Vital Signs (24h Range):  Temp:  [97.7 °F (36.5 °C)-99.9 °F (37.7 °C)] 97.7 °F (36.5 °C)  Pulse:  [] 91  Resp:  [20-59] 36  SpO2:  [87 %-100 %] 94 %  BP: (110-139)/() 139/82     Weight: 73.2 kg (161 lb 6 oz)  Body mass index is 31.52 kg/(m^2).    Intake/Output Summary (Last 24 hours) at 04/21/17 1633  Last data filed at 04/21/17 1300   Gross per 24 hour   Intake              890 ml   Output              250 ml   Net              640 ml      Physical Exam   Constitutional: She is oriented to person, place, and time. She appears well-developed and well-nourished. No distress.   Emotional and tearful   HENT:   Right Ear: External ear normal.   Left Ear: External ear normal.   Nose: Nose normal.   Eyes: EOM are normal. Pupils are equal, round, and reactive to light. No scleral icterus.   Neck: Normal range of motion. Neck supple. No thyromegaly present.   Cardiovascular: Normal rate and normal heart sounds.  Exam reveals no friction rub.    No murmur heard.  Pulmonary/Chest:   Coarse BS bilaterally  Increased work of breathing.   Abdominal: Soft. Bowel sounds are normal. She exhibits no mass. There is no tenderness.   No hepatosplenomegaly   Musculoskeletal: Normal range of motion. She exhibits no edema or deformity.    Neurological: She is alert and oriented to person, place, and time. No cranial nerve deficit.   Skin: Skin is warm and dry. No pallor.       Significant Labs:   BMP:   Recent Labs  Lab 04/21/17  0151   *      K 4.1      CO2 31*   BUN 18   CREATININE 0.7   CALCIUM 9.1     CBC:   Recent Labs  Lab 04/20/17  0432 04/21/17  0151   WBC 11.71 11.15   HGB 9.7* 9.6*   HCT 29.4* 29.6*    220       Significant Imaging: I have reviewed and interpreted all pertinent imaging results/findings within the past 24 hours.    Assessment/Plan:      * Acute respiratory failure with hypoxia and hypercapnia  Due to pneumonia, asthma exacerbation.  Anxiety also probably contributing to dyspnea.  Cont abx, steroids, and nebs.   Currently on high flow NC.  Will get Pulmonary input in AM.    Sleep disturbance  Continue home trazodone QHS.    Tobacco abuse  Counseled on the importance of cessation. Nicotine patch.    Asthma exacerbation  As above    Bipolar 1 disorder  Appreciate Psych input.  Will follow recommended changes to medications.    Anemia of other chronic disease  Stable. Normocytic.    Moderate persistent asthma  Continue current management with steroids and nebulizer treatments.    Pneumonia of both lungs due to infectious organism  Continue moxifloxacin, steroids, and breathing treatments.   Probably bacterial, but unable to determine.  Would broaden ABx's coverage if no improvement by tomorrow.    Bereavement reaction  Supportive care. I feel her anxiety and sorrow are attributing to her perceived symptoms however she clearly objective signs of acute respiratory failure.    Opioid use disorder, severe, dependence  Possible Suboxone withdrawal.  Supportive care.      Substance withdrawal  As above.      VTE Risk Mitigation         Ordered     heparin (porcine) injection 5,000 Units  Every 8 hours     Route:  Subcutaneous        04/19/17 0248     Medium Risk of VTE  Once      04/19/17 0248         Critical Care time spent > 35 minutes.       Arnel Adames MD  Department of Hospital Medicine   [facility]

## 2017-04-21 NOTE — CONSULTS
"[facility]  Psychiatry  Consult Note    Patient Name: Alina Menchaca  MRN: [id number]   Code Status: Full Code  Admission Date: 2017  Hospital Length of Stay: 2 days  Attending Physician: Arnel Adames MD  Primary Care Provider: Meghan Hanna MD    Current Legal Status: N/A    Patient information was obtained from patient, relative(s), past medical records and ER records.   Inpatient consult to Psychiatry  Consult performed by: DANELLE CUEVAS  Consult ordered by: PAVEL LAWRENCE        Subjective:     Principal Problem:Acute respiratory failure with hypoxia and hypercapnia    Chief Complaint:  Trouble breathing     HPI: Patient Alina Menchaca was brought to the hospital for shortness of breath.  She was noted by the primary team to have significant anxiety and be on certain psychiatric medications for which consult was requested to help manage.  Patient is interviewed in her room today with daughter at bedside.  She is noted to be flustered and somewhat anxious stating that her mother passed away earlier in the week and she just returned from the  a couple of days ago.  She says that she has a long psychiatric history dating back to early childhood when she was admitted to psychiatric facilities a few times.  She blames this on living in "72 foster homes".  She says that she is diagnosed with "bipolar manic depressive" and [facility] Dr[facility] Labadie.  Last seen by Dr. Labadie couple of weeks ago.  She says that she has been on many medications in the past of which most have not worked.  She is not sure if the current medications are working for her.  She says that she was placed on risperidone a couple of weeks ago after being transitioned off of Seroquel.  She stopped risperidone couple of days ago when she started to develop significant shakes and tremor.  She has been very depressed lately, especially since the death of her mother.  Difficulty with sleep and " concentration.  She is an anxious person and worries a lot about things that she should not worry about.  She reports previous episodes of being awake with extra energy for days at a time followed by periods of being down and depressed.  She reports a history of occasional auditory hallucinations and hearing things such as doors close.  When she searches, she is not able to find anyone around to make the noise.  She also admits to smoking marijuana daily with last use a couple of days ago.  She also admits to using Suboxone as she purchases from the street on a daily basis.  She has been doing this for months with her last dose being about 3 days ago.  She smokes half to 1 pack of cigarettes daily and denies any alcohol use.  She is currently noted to be taking risperidone 2 mg nightly, trazodone 100 mg nightly and was recently stopped of Celexa.  She is currently complaining of nausea, stomach cramps, severe pain, agitation, tearfulness, physical symptoms.    Patient History      Medical Past Medical History Date Comments Source    as of 4/21/2017 Asthma   Provider    Bipolar 1 disorder   Provider    Manic depression   Provider    Seizures   Provider         Surgical Past Surgical History Laterality Date Comments    as of 4/21/2017 HYSTERECTOMY       APPENDECTOMY       CARPAL TUNNEL RELEASE       HERNIA REPAIR         Family Problem Relation Name Age of Onset Comments Source    as of 4/21/2017 Schizophrenia Mother    Provider    Diabetes Father    Provider    Heart disease Father    Provider      Tobacco Use Smoking Status Source Types Packs/day Years Used Comments Smoking Start Date Smoking Quit Date Smokeless Tobacco Status Smokeless Tobacco Quit Date    as of 4/21/2017 Current Every Day Smoker Provider  Cigarettes 1.00 30.00    Unknown       Alcohol Use Alcohol Use Source Drinks/Week Alcohol/Wk Comments    as of 4/21/2017 No Provider          Drug Use Drug Use Source Types Frequency Comments    as of 4/21/2017  Yes Provider  Marijuana         Sexual Activity Sexually Active Source Birth Control Partners Comments    as of 4/21/2017 Yes Provider    Female       Social ADL ADL Question Response Comments Source    as of 4/21/2017 **None**      Social Doc **None**    as of 4/21/2017      Occupational **None**    as of 4/21/2017      Socioeconomic Marital Status Spouse Name Num of Children Years Education Source    as of 4/21/2017 Single        Preferred Language Ethnicity Race    English /White White      Additional Pertinent History Q A Comments    as of 4/21/2017 Place in Birth Order      Number of Siblings      Raised by      Childhood Trauma      Financial Status      Highest Level of Education      Lives with      Lives in      Criminal History of      Legal Involvement      Does patient have access to a firearm?       Service      Primary Leisure Activity      Spirituality      Caffeine Use         Review of patient's allergies indicates:  No Known Allergies    No current facility-administered medications on file prior to encounter.      Current Outpatient Prescriptions on File Prior to Encounter   Medication Sig    albuterol (PROVENTIL) 2.5 mg /3 mL (0.083 %) nebulizer solution Take 3 mLs (2.5 mg total) by nebulization every 6 (six) hours as needed for Wheezing.    citalopram (CELEXA) 40 MG tablet Take 40 mg by mouth once daily.    FLUTICASONE/SALMETEROL (ADVAIR DISKUS INHL) Inhale into the lungs.    furosemide (LASIX) 20 MG tablet Take 1 tablet (20 mg total) by mouth once daily.    HYDROXYZINE PAMOATE (VISTARIL ORAL) Take by mouth as needed.     oxycodone-acetaminophen (PERCOCET) 5-325 mg per tablet Take 1 tablet by mouth every 8 (eight) hours as needed for Pain (take 1-2 tablets by mouth every 8 hrs as needed for pain. Do not drink alcohol or drive while taking these. Do not take with other sedating medications.).    trazodone (DESYREL) 100 MG tablet Take 100 mg by mouth every evening.     alprazolam (XANAX) 0.5 MG tablet Take 1 tablet (0.5 mg total) by mouth 3 (three) times daily. (Patient taking differently: Take 0.5 mg by mouth 3 (three) times daily as needed. )    amantadine HCl (SYMMETREL) 100 mg capsule Take 100 mg by mouth 2 (two) times daily.     Psychotherapeutics     Start     Stop Route Frequency Ordered    04/20/17 2100  trazodone tablet 50 mg      -- Oral Nightly 04/19/17 1150    04/19/17 2100  risperidone tablet 2 mg      -- Oral Nightly 04/19/17 1209    04/20/17 0941  lorazepam tablet 1 mg      -- Oral Every 6 hours PRN 04/20/17 0942    04/20/17 1519  olanzapine injection 2.5 mg      -- IM Every 8 hours PRN 04/20/17 1519        Review of Systems   Constitutional: Positive for activity change and appetite change.   Respiratory: Positive for cough and shortness of breath. Negative for chest tightness.    Cardiovascular: Negative for chest pain.   Gastrointestinal: Positive for abdominal pain and nausea.   Musculoskeletal: Positive for arthralgias, back pain, myalgias and neck pain.     Objective:     Vital Signs (Most Recent):  Temp: 97.7 °F (36.5 °C) (04/21/17 1102)  Pulse: 91 (04/21/17 1523)  Resp: (!) 36 (04/21/17 1523)  BP: 139/82 (04/21/17 1300)  SpO2: (!) 94 % (04/21/17 1523) Vital Signs (24h Range):  Temp:  [97.7 °F (36.5 °C)-99.9 °F (37.7 °C)] 97.7 °F (36.5 °C)  Pulse:  [] 91  Resp:  [20-59] 36  SpO2:  [87 %-100 %] 94 %  BP: (110-139)/() 139/82     Height: 5' (152.4 cm)  Weight: 73.2 kg (161 lb 6 oz)  Body mass index is 31.52 kg/(m^2).      Intake/Output Summary (Last 24 hours) at 04/21/17 1605  Last data filed at 04/21/17 1300   Gross per 24 hour   Intake              890 ml   Output              250 ml   Net              640 ml       Physical Exam   Psychiatric:   EXAMINATION    CONSTITUTIONAL  General Appearance: Hospital attire, nasal cannula, restless in bed    MUSCULOSKELETAL  Muscle Strength and Tone: Normal  Abnormal Involuntary Movements: None noted although  she reports a tremor  Gait and Station: Not observed    PSYCHIATRIC MENTAL STATUS EXAM   Level of Consciousness: Awake and alert  Orientation: Name, place, date, situation  Grooming: Poor  Psychomotor Behavior: Physical agitation and fidgety  Speech: Labile rate and tone, normal volume  Language: No neologisms  Mood: Sad and anxious  Affect: Labile  Thought Process: Linear but can be perseverative  Associations: Intact  Thought Content: Denies suicidal/homicidal/psychosis  Memory: Grossly intact  Attention: Easily distracted  Fund of Knowledge: Intact to conversation  Insight: Limited  Judgment: Poor            Significant Labs:   Last 24 Hours:   Recent Lab Results       04/21/17  0151      Albumin 2.6(L)     Alkaline Phosphatase 106     ALT 33     Anion Gap 10     AST 45(H)     Baso # 0.00     Basophil% 0.0     Total Bilirubin 0.3  Comment:  For infants and newborns, interpretation of results should be based  on gestational age, weight and in agreement with clinical  observations.  Premature Infant recommended reference ranges:  Up to 24 hours.............<8.0 mg/dL  Up to 48 hours............<12.0 mg/dL  3-5 days..................<15.0 mg/dL  6-29 days.................<15.0 mg/dL       BUN, Bld 18     Calcium 9.1     Chloride 103     CO2 31(H)     Creatinine 0.7     Differential Method Automated     eGFR if  >60     eGFR if non  >60  Comment:  Calculation used to obtain the estimated glomerular filtration  rate (eGFR) is the CKD-EPI equation. Since race is unknown   in our information system, the eGFR values for   -American and Non--American patients are given   for each creatinine result.       Eos # 0.0     Eosinophil% 0.0     Glucose 117(H)     Gran # 10.1(H)     Gran% 90.8(H)     Hematocrit 29.6(L)     Hemoglobin 9.6(L)     Lymph # 0.3(L)     Lymph% 2.9(L)     MCH 29.0     MCHC 32.4     MCV 89     Mono # 0.7     Mono% 5.9     MPV 8.9(L)     Platelets 220      Potassium 4.1     Total Protein 5.9(L)     RBC 3.31(L)     RDW 13.7     Sodium 144     WBC 11.15           Significant Imaging: I have reviewed all pertinent imaging results/findings within the past 24 hours.    Assessment/Plan:     Tobacco abuse  Educated on the negative aspects of tobacco use.  States that she is going to quit on her own.    Bipolar 1 disorder  Patient likely has an underlying bipolar disorder given her history that is currently exacerbated by withdrawal symptoms.  Would recommend to start Depakote 250 mg by mouth 3 times a day and stop risperidone.  The Depakote will help with mood stabilization.  Check a Depakote level in 5 days.  Also continue trazodone 100 mg nightly.  Would recommend to stop citalopram and consider starting duloxetine 30 mg daily for anxiety, depression, chronic pain.  She is not prescribed benzodiazepines and therefore this should not be used in her care as it will only increase her risk of delirium given the significance of her medical problems.  Stop lorazepam and alprazolam.  Should she have non-redirectable or psychotic agitation, utilize olanzapine 10 mg by mouth/IM when necessary every 8 hours.  She was likely placed on amantadine 100 mg twice a day for tremors of some sort.  Would recommend to continue this for the current and its use can be reevaluated later.    Bereavement reaction  Patient would benefit from grief and bereavement therapy.  This can be provided as an outpatient.    Opioid use disorder, severe, dependence  Although Suboxone is not an opioid, there is no selection of diagnoses in the system for a substance use disorder of Suboxone.  Patient has a problem with this as she is purchasing it off of the street and has built a tolerance.  She is educated on the negative aspects but is not willing to do rehabilitation.  This should be brought up to her outpatient psychiatrist as it is likely contributing to her condition.    Substance withdrawal  She is  likely withdrawing from Suboxone which has similar but prolonged effects to that of opioid withdrawal.  We treat with symptomatic in organ medications such as muscle relaxers, clonidine, antidiarrheal medicines, etc.  These withdrawals will have significant physical symptoms associated at or very uncomfortable.  Limit controlled substances.       Alberto Sheriff MD   Psychiatry  [facility]

## 2017-04-21 NOTE — PLAN OF CARE
Problem: Patient Care Overview  Goal: Plan of Care Review  Outcome: Ongoing (interventions implemented as appropriate)  Pt VSS, NAD. Pt remains on BIPAP, 80% 12/5. Accessory muscle use noted, 02 sats high 90s. Respiratory distress remarkably improved. Pt still anxious, being treated with prn ativan. NPO. Pt with 2 UO, clear yellow urine. Pt afebrile, WBC WNL this AM. IV antibiotics given. No new skin breakdown, no falls, pt able to turn self in bed.

## 2017-04-21 NOTE — SUBJECTIVE & OBJECTIVE
Interval History: Complained of increased SOB overnight and wanting to be intubated.  Refused ABG's.  Very anxious and tearful.    Review of Systems   HENT: Negative for ear discharge and ear pain.    Eyes: Negative for pain and redness.   Endocrine: Negative for polyphagia and polyuria.   Genitourinary: Negative for difficulty urinating and dysuria.     Objective:     Vital Signs (Most Recent):  Temp: 97.7 °F (36.5 °C) (04/21/17 1102)  Pulse: 91 (04/21/17 1523)  Resp: (!) 36 (04/21/17 1523)  BP: 139/82 (04/21/17 1300)  SpO2: (!) 94 % (04/21/17 1523) Vital Signs (24h Range):  Temp:  [97.7 °F (36.5 °C)-99.9 °F (37.7 °C)] 97.7 °F (36.5 °C)  Pulse:  [] 91  Resp:  [20-59] 36  SpO2:  [87 %-100 %] 94 %  BP: (110-139)/() 139/82     Weight: 73.2 kg (161 lb 6 oz)  Body mass index is 31.52 kg/(m^2).    Intake/Output Summary (Last 24 hours) at 04/21/17 1633  Last data filed at 04/21/17 1300   Gross per 24 hour   Intake              890 ml   Output              250 ml   Net              640 ml      Physical Exam   Constitutional: She is oriented to person, place, and time. She appears well-developed and well-nourished. No distress.   Emotional and tearful   HENT:   Right Ear: External ear normal.   Left Ear: External ear normal.   Nose: Nose normal.   Eyes: EOM are normal. Pupils are equal, round, and reactive to light. No scleral icterus.   Neck: Normal range of motion. Neck supple. No thyromegaly present.   Cardiovascular: Normal rate and normal heart sounds.  Exam reveals no friction rub.    No murmur heard.  Pulmonary/Chest:   Coarse BS bilaterally  Increased work of breathing.   Abdominal: Soft. Bowel sounds are normal. She exhibits no mass. There is no tenderness.   No hepatosplenomegaly   Musculoskeletal: Normal range of motion. She exhibits no edema or deformity.   Neurological: She is alert and oriented to person, place, and time. No cranial nerve deficit.   Skin: Skin is warm and dry. No pallor.        Significant Labs:   BMP:   Recent Labs  Lab 04/21/17  0151   *      K 4.1      CO2 31*   BUN 18   CREATININE 0.7   CALCIUM 9.1     CBC:   Recent Labs  Lab 04/20/17  0432 04/21/17 0151   WBC 11.71 11.15   HGB 9.7* 9.6*   HCT 29.4* 29.6*    220       Significant Imaging: I have reviewed and interpreted all pertinent imaging results/findings within the past 24 hours.

## 2017-04-21 NOTE — ASSESSMENT & PLAN NOTE
Patient likely has an underlying bipolar disorder given her history that is currently exacerbated by withdrawal symptoms.  Would recommend to start Depakote 250 mg by mouth 3 times a day and stop risperidone.  The Depakote will help with mood stabilization.  Check a Depakote level in 5 days.  Also continue trazodone 100 mg nightly.  Would recommend to stop citalopram and consider starting duloxetine 30 mg daily for anxiety, depression, chronic pain.  She is not prescribed benzodiazepines and therefore this should not be used in her care as it will only increase her risk of delirium given the significance of her medical problems.  Stop lorazepam and alprazolam.  Should she have non-redirectable or psychotic agitation, utilize olanzapine 10 mg by mouth/IM when necessary every 8 hours.  She was likely placed on amantadine 100 mg twice a day for tremors of some sort.  Would recommend to continue this for the current and its use can be reevaluated later.

## 2017-04-21 NOTE — SUBJECTIVE & OBJECTIVE
Patient History      Medical Past Medical History Date Comments Source    as of 4/21/2017 Asthma   Provider    Bipolar 1 disorder   Provider    Manic depression   Provider    Seizures   Provider         Surgical Past Surgical History Laterality Date Comments    as of 4/21/2017 HYSTERECTOMY       APPENDECTOMY       CARPAL TUNNEL RELEASE       HERNIA REPAIR         Family Problem Relation Name Age of Onset Comments Source    as of 4/21/2017 Schizophrenia Mother    Provider    Diabetes Father    Provider    Heart disease Father    Provider      Tobacco Use Smoking Status Source Types Packs/day Years Used Comments Smoking Start Date Smoking Quit Date Smokeless Tobacco Status Smokeless Tobacco Quit Date    as of 4/21/2017 Current Every Day Smoker Provider  Cigarettes 1.00 30.00    Unknown       Alcohol Use Alcohol Use Source Drinks/Week Alcohol/Wk Comments    as of 4/21/2017 No Provider          Drug Use Drug Use Source Types Frequency Comments    as of 4/21/2017 Yes Provider  Marijuana         Sexual Activity Sexually Active Source Birth Control Partners Comments    as of 4/21/2017 Yes Provider    Female       Social ADL ADL Question Response Comments Source    as of 4/21/2017 **None**      Social Doc **None**    as of 4/21/2017      Occupational **None**    as of 4/21/2017      Socioeconomic Marital Status Spouse Name Num of Children Years Education Source    as of 4/21/2017 Single        Preferred Language Ethnicity Race    English /White White      Additional Pertinent History Q A Comments    as of 4/21/2017 Place in Birth Order      Number of Siblings      Raised by      Childhood Trauma      Financial Status      Highest Level of Education      Lives with      Lives in      Criminal History of      Legal Involvement      Does patient have access to a firearm?       Service      Primary Leisure Activity      Spirituality      Caffeine Use         Review of patient's allergies indicates:  No Known  Allergies    No current facility-administered medications on file prior to encounter.      Current Outpatient Prescriptions on File Prior to Encounter   Medication Sig    albuterol (PROVENTIL) 2.5 mg /3 mL (0.083 %) nebulizer solution Take 3 mLs (2.5 mg total) by nebulization every 6 (six) hours as needed for Wheezing.    citalopram (CELEXA) 40 MG tablet Take 40 mg by mouth once daily.    FLUTICASONE/SALMETEROL (ADVAIR DISKUS INHL) Inhale into the lungs.    furosemide (LASIX) 20 MG tablet Take 1 tablet (20 mg total) by mouth once daily.    HYDROXYZINE PAMOATE (VISTARIL ORAL) Take by mouth as needed.     oxycodone-acetaminophen (PERCOCET) 5-325 mg per tablet Take 1 tablet by mouth every 8 (eight) hours as needed for Pain (take 1-2 tablets by mouth every 8 hrs as needed for pain. Do not drink alcohol or drive while taking these. Do not take with other sedating medications.).    trazodone (DESYREL) 100 MG tablet Take 100 mg by mouth every evening.    alprazolam (XANAX) 0.5 MG tablet Take 1 tablet (0.5 mg total) by mouth 3 (three) times daily. (Patient taking differently: Take 0.5 mg by mouth 3 (three) times daily as needed. )    amantadine HCl (SYMMETREL) 100 mg capsule Take 100 mg by mouth 2 (two) times daily.     Psychotherapeutics     Start     Stop Route Frequency Ordered    04/20/17 2100  trazodone tablet 50 mg      -- Oral Nightly 04/19/17 1150    04/19/17 2100  risperidone tablet 2 mg      -- Oral Nightly 04/19/17 1209    04/20/17 0941  lorazepam tablet 1 mg      -- Oral Every 6 hours PRN 04/20/17 0942    04/20/17 1519  olanzapine injection 2.5 mg      -- IM Every 8 hours PRN 04/20/17 1519        Review of Systems   Constitutional: Positive for activity change and appetite change.   Respiratory: Positive for cough and shortness of breath. Negative for chest tightness.    Cardiovascular: Negative for chest pain.   Gastrointestinal: Positive for abdominal pain and nausea.   Musculoskeletal: Positive for  arthralgias, back pain, myalgias and neck pain.     Objective:     Vital Signs (Most Recent):  Temp: 97.7 °F (36.5 °C) (04/21/17 1102)  Pulse: 91 (04/21/17 1523)  Resp: (!) 36 (04/21/17 1523)  BP: 139/82 (04/21/17 1300)  SpO2: (!) 94 % (04/21/17 1523) Vital Signs (24h Range):  Temp:  [97.7 °F (36.5 °C)-99.9 °F (37.7 °C)] 97.7 °F (36.5 °C)  Pulse:  [] 91  Resp:  [20-59] 36  SpO2:  [87 %-100 %] 94 %  BP: (110-139)/() 139/82     Height: 5' (152.4 cm)  Weight: 73.2 kg (161 lb 6 oz)  Body mass index is 31.52 kg/(m^2).      Intake/Output Summary (Last 24 hours) at 04/21/17 1605  Last data filed at 04/21/17 1300   Gross per 24 hour   Intake              890 ml   Output              250 ml   Net              640 ml       Physical Exam   Psychiatric:   EXAMINATION    CONSTITUTIONAL  General Appearance: Hospital attire, nasal cannula, restless in bed    MUSCULOSKELETAL  Muscle Strength and Tone: Normal  Abnormal Involuntary Movements: None noted although she reports a tremor  Gait and Station: Not observed    PSYCHIATRIC MENTAL STATUS EXAM   Level of Consciousness: Awake and alert  Orientation: Name, place, date, situation  Grooming: Poor  Psychomotor Behavior: Physical agitation and fidgety  Speech: Labile rate and tone, normal volume  Language: No neologisms  Mood: Sad and anxious  Affect: Labile  Thought Process: Linear but can be perseverative  Associations: Intact  Thought Content: Denies suicidal/homicidal/psychosis  Memory: Grossly intact  Attention: Easily distracted  Fund of Knowledge: Intact to conversation  Insight: Limited  Judgment: Poor            Significant Labs:   Last 24 Hours:   Recent Lab Results       04/21/17  0151      Albumin 2.6(L)     Alkaline Phosphatase 106     ALT 33     Anion Gap 10     AST 45(H)     Baso # 0.00     Basophil% 0.0     Total Bilirubin 0.3  Comment:  For infants and newborns, interpretation of results should be based  on gestational age, weight and in agreement with  clinical  observations.  Premature Infant recommended reference ranges:  Up to 24 hours.............<8.0 mg/dL  Up to 48 hours............<12.0 mg/dL  3-5 days..................<15.0 mg/dL  6-29 days.................<15.0 mg/dL       BUN, Bld 18     Calcium 9.1     Chloride 103     CO2 31(H)     Creatinine 0.7     Differential Method Automated     eGFR if  >60     eGFR if non  >60  Comment:  Calculation used to obtain the estimated glomerular filtration  rate (eGFR) is the CKD-EPI equation. Since race is unknown   in our information system, the eGFR values for   -American and Non--American patients are given   for each creatinine result.       Eos # 0.0     Eosinophil% 0.0     Glucose 117(H)     Gran # 10.1(H)     Gran% 90.8(H)     Hematocrit 29.6(L)     Hemoglobin 9.6(L)     Lymph # 0.3(L)     Lymph% 2.9(L)     MCH 29.0     MCHC 32.4     MCV 89     Mono # 0.7     Mono% 5.9     MPV 8.9(L)     Platelets 220     Potassium 4.1     Total Protein 5.9(L)     RBC 3.31(L)     RDW 13.7     Sodium 144     WBC 11.15           Significant Imaging: I have reviewed all pertinent imaging results/findings within the past 24 hours.

## 2017-04-21 NOTE — ASSESSMENT & PLAN NOTE
Although Suboxone is not an opioid, there is no selection of diagnoses in the system for a substance use disorder of Suboxone.  Patient has a problem with this as she is purchasing it off of the street and has built a tolerance.  She is educated on the negative aspects but is not willing to do rehabilitation.  This should be brought up to her outpatient psychiatrist as it is likely contributing to her condition.

## 2017-04-21 NOTE — MEDICAL/APP STUDENT
"Consult Note  Psychiatry    Consult Requested By: Arnel Adames MD    Reason for Consult: Anxiety vs Withdrawal    SUBJECTIVE:     Chief Complaint/Reason for Admission: Shortness of Breath    History of Present Illness:  Alina Menchaca is a 46 y.o. old female was admitted with principal problem of Acute respiratory failure with hypoxia and hypercapnia. Ms Menchaca, who has a history of asthma and Seizures  And a psychiatric history of Bipolar Disorder I and manic Depression was originally admitted for shortness of breath on 04/18/2017. Patient was found to have pneumonia of both lungs and acute respiratory failure.  She was then put on BIPAP. On the evening of 04/20/2017, Ms Menchaca was agitated, anxious and started to have visual halluycinations. She repeated removed her BIPAP and refused blood draw for ABG. She yelled at the doctor and nurse and repeatedly asked to be intubated. Zyprexa and bilateral wrist restraints were ordered. Patient did eventually fall asleep. The restraints were removed this morning.    This morning the patient was lying in bed comfortably and was cooperative on interview.  Patient stated that she was very aggravated last night because "people were belittling me" and when she woke up she found herself in restraints. Patient said she is usually anxious and described herself as OCD. She says she takes Trazadone, Seoquel, Celexa and Respirdal for Bipolar Disorder, which she was diagnosed with 10 year ago.Patient also says she takes Suboxone (non-prescription) for pain as needed. Patient denies any other drug use.  However, she stopped taking REspirdal last Sunday because she became "real mean" and would controllably shake her hands.  She was noticing this effects for 2 weeks before she and her family decided to stop. Patient says she occasionally has auditory hallucinations tell her to clean everything.  She also has visual hallucinations, the last time being 4 days, the day her mother " "passed away. Patient reported that Her mother had a history of bipolar disorder, schizophrenia and manic depression. Patient denies SI/HI and denies A/VH. Patient smokres marijuana everyday and has smoked for the past 15 years.  Patient denies alcohol use  Primary Complaints Include: { :84478}.  Pain Scale: {PAIN 0-10:77468}      Onset of symptoms was {onset:90275} with {Desc; clinical condition:17::"unchanged"} course since that time.       Pertinent Signs and Symptoms:   Patient Alina Menchaca presents to the hospital ***    Past Psychiatric History:   ***  Currently in treatment with  ***.  Does patient have an Advance Directive for Mental Health Treatment?{YES,NO,WILDCARD:87715::"Yes"}   (If Yes, inform Patient to bring a copy)     Education: { :87896}  Other Pertinent History: {historical info:87462}    Review of patient's allergies indicates:  No Known Allergies     Past Medical History:   Diagnosis Date    Asthma     Bipolar 1 disorder     Manic depression     Seizures        Patient aware of biomedical complications? (associated with substance abuse, mental illness) {YES,NO,WILDCARD:32506::"Yes"}    Past Surgical History:   Procedure Laterality Date    APPENDECTOMY      CARPAL TUNNEL RELEASE      HERNIA REPAIR      HYSTERECTOMY       Social History   Substance Use Topics    Smoking status: Current Every Day Smoker     Packs/day: 1.00     Years: 30.00     Types: Cigarettes    Smokeless tobacco: None    Alcohol use No     Family Psychiatric History: ***  Restoration: ***  History of Abuse (whether as abuser or abused): ***  Leisure/recreation: ***  Childhood history: ***   history: ***  Financial status: ***    Substance Abuse History: (include age at onset, duration, intensity, pattern of use, consequences of use)  {Recreational drugs:63382}  Use of Alcohol: {etoh use:66748}  Tobacco: {YES (DEF)/NO:48005}    Treatment and reponse to treatment:   {TREATMENT RESPONSE - " "PSYCH:12999}    Psychiatric ROS:   See Dr. Alberto Pepper MD's Admission Note of date *** for ROS. Pertinent somatic complaints include {ROS:48325}      OBJECTIVE:     Vital Signs (Most Recent)  Temp: 98.4 °F (36.9 °C) (04/21/17 0745)  Pulse: 95 (04/21/17 1102)  Resp: (!) 38 (04/21/17 1102)  BP: 124/63 (04/21/17 1021)  Weight: 73.2 kg (161 lb 6 oz) (04/21/17 0500)  BMI (Calculated): 32.6 (04/19/17 0235)    Physical Exam:  {Exam:123448855}    Neurological: {Findings; ROS neuro:24759::"no seizures or tremors, no gait problems, no headaches"}    Mental Status Evaluation:  EXAMINATION    CONSTITUTIONAL  General Appearance: ***    MUSCULOSKELETAL  Muscle Strength and Tone: ***  Abnormal Involuntary Movements: ***  Gait and Station: ***    PSYCHIATRIC   Level of Consciousness: ***  Orientation: ***  Grooming: ***  Psychomotor Behavior: ***  Speech: ***  Language: ***  Mood: ***  Affect: ***  Thought Process: ***  Associations: ***  Thought Content: ***  Memory: ***  Attention: ***  Fund of Knowledge: ***  Insight: ***  Judgment: ***    Other:  Maladaptive or problem behaviors - ***  Social - ***  Living situation - ***  Recovery Environment - ***  Community Resources - ***  Treatment response - ***    Laboratory:  Recent Results (from the past 24 hour(s))   ISTAT PROCEDURE    Collection Time: 04/20/17 11:38 AM   Result Value Ref Range    POC PH 7.346 (L) 7.35 - 7.45    POC PCO2 58.8 (HH) 35 - 45 mmHg    POC PO2 200 (H) 80 - 100 mmHg    POC HCO3 32.2 (H) 24 - 28 mmol/L    POC BE 5 -2 to 2 mmol/L    POC SATURATED O2 100 95 - 100 %    POC TCO2 34 (H) 23 - 27 mmol/L    Rate 16     Sample ARTERIAL     Site RR     DelSys CPAP/BiPAP     Mode BiPAP     FiO2 100     Spont Rate 30     Min Vol 21.2     Sp02 99     IP 12     EP 6    CBC auto differential    Collection Time: 04/21/17  1:51 AM   Result Value Ref Range    WBC 11.15 3.90 - 12.70 K/uL    RBC 3.31 (L) 4.00 - 5.40 M/uL    Hemoglobin 9.6 (L) 12.0 - 16.0 g/dL    Hematocrit " 29.6 (L) 37.0 - 48.5 %    MCV 89 82 - 98 fL    MCH 29.0 27.0 - 31.0 pg    MCHC 32.4 32.0 - 36.0 %    RDW 13.7 11.5 - 14.5 %    Platelets 220 150 - 350 K/uL    MPV 8.9 (L) 9.2 - 12.9 fL    Gran # 10.1 (H) 1.8 - 7.7 K/uL    Lymph # 0.3 (L) 1.0 - 4.8 K/uL    Mono # 0.7 0.3 - 1.0 K/uL    Eos # 0.0 0.0 - 0.5 K/uL    Baso # 0.00 0.00 - 0.20 K/uL    Gran% 90.8 (H) 38.0 - 73.0 %    Lymph% 2.9 (L) 18.0 - 48.0 %    Mono% 5.9 4.0 - 15.0 %    Eosinophil% 0.0 0.0 - 8.0 %    Basophil% 0.0 0.0 - 1.9 %    Differential Method Automated    Comprehensive metabolic panel    Collection Time: 04/21/17  1:51 AM   Result Value Ref Range    Sodium 144 136 - 145 mmol/L    Potassium 4.1 3.5 - 5.1 mmol/L    Chloride 103 95 - 110 mmol/L    CO2 31 (H) 23 - 29 mmol/L    Glucose 117 (H) 70 - 110 mg/dL    BUN, Bld 18 6 - 20 mg/dL    Creatinine 0.7 0.5 - 1.4 mg/dL    Calcium 9.1 8.7 - 10.5 mg/dL    Total Protein 5.9 (L) 6.0 - 8.4 g/dL    Albumin 2.6 (L) 3.5 - 5.2 g/dL    Total Bilirubin 0.3 0.1 - 1.0 mg/dL    Alkaline Phosphatase 106 55 - 135 U/L    AST 45 (H) 10 - 40 U/L    ALT 33 10 - 44 U/L    Anion Gap 10 8 - 16 mmol/L    eGFR if African American >60 >60 mL/min/1.73 m^2    eGFR if non African American >60 >60 mL/min/1.73 m^2       ASSESSMENT/PLAN:   DIAGNOSIS:  ***    Plan: {TREATMENT PLAN - PSYCH:80984}    ***         Deedee Albrecht  [employer] [facility] [employer] Year 3  Psychiatry  [facility]

## 2017-04-21 NOTE — ASSESSMENT & PLAN NOTE
She is likely withdrawing from Suboxone which has similar but prolonged effects to that of opioid withdrawal.  We treat with symptomatic in organ medications such as muscle relaxers, clonidine, antidiarrheal medicines, etc.  These withdrawals will have significant physical symptoms associated at or very uncomfortable.  Limit controlled substances.

## 2017-04-22 LAB
ALBUMIN SERPL BCP-MCNC: 2.4 G/DL
ALLENS TEST: ABNORMAL
ALP SERPL-CCNC: 96 U/L
ALT SERPL W/O P-5'-P-CCNC: 31 U/L
ANION GAP SERPL CALC-SCNC: 8 MMOL/L
AST SERPL-CCNC: 27 U/L
BASOPHILS # BLD AUTO: 0.01 K/UL
BASOPHILS NFR BLD: 0.1 %
BILIRUB SERPL-MCNC: 0.3 MG/DL
BUN SERPL-MCNC: 22 MG/DL
CALCIUM SERPL-MCNC: 8.5 MG/DL
CHLORIDE SERPL-SCNC: 101 MMOL/L
CO2 SERPL-SCNC: 33 MMOL/L
CREAT SERPL-MCNC: 0.7 MG/DL
DELSYS: ABNORMAL
DIFFERENTIAL METHOD: ABNORMAL
EOSINOPHIL # BLD AUTO: 0 K/UL
EOSINOPHIL NFR BLD: 0 %
EP: 6
ERYTHROCYTE [DISTWIDTH] IN BLOOD BY AUTOMATED COUNT: 13.6 %
EST. GFR  (AFRICAN AMERICAN): >60 ML/MIN/1.73 M^2
EST. GFR  (NON AFRICAN AMERICAN): >60 ML/MIN/1.73 M^2
GLUCOSE SERPL-MCNC: 167 MG/DL
HCO3 UR-SCNC: 37.6 MMOL/L (ref 24–28)
HCT VFR BLD AUTO: 31 %
HGB BLD-MCNC: 9.9 G/DL
IP: 12
LYMPHOCYTES # BLD AUTO: 0.4 K/UL
LYMPHOCYTES NFR BLD: 4.9 %
MCH RBC QN AUTO: 29.2 PG
MCHC RBC AUTO-ENTMCNC: 31.9 %
MCV RBC AUTO: 91 FL
MODE: ABNORMAL
MONOCYTES # BLD AUTO: 0.4 K/UL
MONOCYTES NFR BLD: 5 %
NEUTROPHILS # BLD AUTO: 6.6 K/UL
NEUTROPHILS NFR BLD: 89.1 %
PCO2 BLDA: 59 MMHG (ref 35–45)
PH SMN: 7.41 [PH] (ref 7.35–7.45)
PLATELET # BLD AUTO: 194 K/UL
PMV BLD AUTO: 8.8 FL
PO2 BLDA: 71 MMHG (ref 80–100)
POC BE: 11 MMOL/L
POC SATURATED O2: 94 % (ref 95–100)
POC TCO2: 39 MMOL/L (ref 23–27)
POTASSIUM SERPL-SCNC: 4.4 MMOL/L
PROT SERPL-MCNC: 5.5 G/DL
RBC # BLD AUTO: 3.39 M/UL
SAMPLE: ABNORMAL
SITE: ABNORMAL
SODIUM SERPL-SCNC: 142 MMOL/L
SP02: 60
WBC # BLD AUTO: 7.4 K/UL

## 2017-04-22 PROCEDURE — 27100171 HC OXYGEN HIGH FLOW UP TO 24 HOURS

## 2017-04-22 PROCEDURE — 63600175 PHARM REV CODE 636 W HCPCS: Performed by: INTERNAL MEDICINE

## 2017-04-22 PROCEDURE — 94640 AIRWAY INHALATION TREATMENT: CPT

## 2017-04-22 PROCEDURE — 82803 BLOOD GASES ANY COMBINATION: CPT

## 2017-04-22 PROCEDURE — 94660 CPAP INITIATION&MGMT: CPT

## 2017-04-22 PROCEDURE — 25000003 PHARM REV CODE 250: Performed by: HOSPITALIST

## 2017-04-22 PROCEDURE — 36600 WITHDRAWAL OF ARTERIAL BLOOD: CPT

## 2017-04-22 PROCEDURE — 63600175 PHARM REV CODE 636 W HCPCS: Performed by: HOSPITALIST

## 2017-04-22 PROCEDURE — 85025 COMPLETE CBC W/AUTO DIFF WBC: CPT

## 2017-04-22 PROCEDURE — 99900035 HC TECH TIME PER 15 MIN (STAT)

## 2017-04-22 PROCEDURE — C9113 INJ PANTOPRAZOLE SODIUM, VIA: HCPCS | Performed by: INTERNAL MEDICINE

## 2017-04-22 PROCEDURE — 20000000 HC ICU ROOM

## 2017-04-22 PROCEDURE — 25000003 PHARM REV CODE 250: Performed by: INTERNAL MEDICINE

## 2017-04-22 PROCEDURE — 80053 COMPREHEN METABOLIC PANEL: CPT

## 2017-04-22 PROCEDURE — 25000003 PHARM REV CODE 250: Performed by: EMERGENCY MEDICINE

## 2017-04-22 PROCEDURE — 36415 COLL VENOUS BLD VENIPUNCTURE: CPT

## 2017-04-22 PROCEDURE — 63600175 PHARM REV CODE 636 W HCPCS: Performed by: EMERGENCY MEDICINE

## 2017-04-22 PROCEDURE — 25000242 PHARM REV CODE 250 ALT 637 W/ HCPCS: Performed by: EMERGENCY MEDICINE

## 2017-04-22 PROCEDURE — 27000221 HC OXYGEN, UP TO 24 HOURS

## 2017-04-22 RX ADMIN — MOXIFLOXACIN HYDROCHLORIDE 400 MG: 400 INJECTION, SOLUTION INTRAVENOUS at 11:04

## 2017-04-22 RX ADMIN — Medication 3 ML: at 06:04

## 2017-04-22 RX ADMIN — Medication 3 ML: at 02:04

## 2017-04-22 RX ADMIN — OXYCODONE HYDROCHLORIDE AND ACETAMINOPHEN 1 TABLET: 5; 325 TABLET ORAL at 07:04

## 2017-04-22 RX ADMIN — METHYLPREDNISOLONE SODIUM SUCCINATE 80 MG: 125 INJECTION, POWDER, FOR SOLUTION INTRAMUSCULAR; INTRAVENOUS at 06:04

## 2017-04-22 RX ADMIN — IPRATROPIUM BROMIDE AND ALBUTEROL SULFATE 3 ML: .5; 3 SOLUTION RESPIRATORY (INHALATION) at 11:04

## 2017-04-22 RX ADMIN — DIVALPROEX SODIUM 250 MG: 250 TABLET, DELAYED RELEASE ORAL at 09:04

## 2017-04-22 RX ADMIN — PROMETHAZINE HYDROCHLORIDE AND CODEINE PHOSPHATE 5 ML: 6.25; 1 SYRUP ORAL at 11:04

## 2017-04-22 RX ADMIN — IPRATROPIUM BROMIDE AND ALBUTEROL SULFATE 3 ML: .5; 3 SOLUTION RESPIRATORY (INHALATION) at 03:04

## 2017-04-22 RX ADMIN — MOXIFLOXACIN HYDROCHLORIDE 400 MG: 400 INJECTION, SOLUTION INTRAVENOUS at 12:04

## 2017-04-22 RX ADMIN — OXYCODONE HYDROCHLORIDE AND ACETAMINOPHEN 1 TABLET: 5; 325 TABLET ORAL at 06:04

## 2017-04-22 RX ADMIN — NICOTINE 1 PATCH: 21 PATCH, EXTENDED RELEASE TRANSDERMAL at 08:04

## 2017-04-22 RX ADMIN — Medication 10 ML: at 09:04

## 2017-04-22 RX ADMIN — DIVALPROEX SODIUM 250 MG: 250 TABLET, DELAYED RELEASE ORAL at 06:04

## 2017-04-22 RX ADMIN — OLANZAPINE 2.5 MG: 10 INJECTION, POWDER, LYOPHILIZED, FOR SOLUTION INTRAMUSCULAR at 09:04

## 2017-04-22 RX ADMIN — DOCUSATE SODIUM AND SENNOSIDES 1 TABLET: 8.6; 5 TABLET, FILM COATED ORAL at 08:04

## 2017-04-22 RX ADMIN — IPRATROPIUM BROMIDE AND ALBUTEROL SULFATE 3 ML: .5; 3 SOLUTION RESPIRATORY (INHALATION) at 07:04

## 2017-04-22 RX ADMIN — DULOXETINE 30 MG: 30 CAPSULE, DELAYED RELEASE ORAL at 08:04

## 2017-04-22 RX ADMIN — PANTOPRAZOLE SODIUM 40 MG: 40 INJECTION, POWDER, FOR SOLUTION INTRAVENOUS at 08:04

## 2017-04-22 RX ADMIN — DIVALPROEX SODIUM 250 MG: 250 TABLET, DELAYED RELEASE ORAL at 01:04

## 2017-04-22 RX ADMIN — PIPERACILLIN AND TAZOBACTAM 4.5 G: 4; .5 INJECTION, POWDER, LYOPHILIZED, FOR SOLUTION INTRAVENOUS; PARENTERAL at 07:04

## 2017-04-22 RX ADMIN — IPRATROPIUM BROMIDE AND ALBUTEROL SULFATE 3 ML: .5; 3 SOLUTION RESPIRATORY (INHALATION) at 08:04

## 2017-04-22 RX ADMIN — ENOXAPARIN SODIUM 40 MG: 100 INJECTION SUBCUTANEOUS at 04:04

## 2017-04-22 RX ADMIN — OLANZAPINE 2.5 MG: 10 INJECTION, POWDER, LYOPHILIZED, FOR SOLUTION INTRAMUSCULAR at 04:04

## 2017-04-22 RX ADMIN — PIPERACILLIN AND TAZOBACTAM 4.5 G: 4; .5 INJECTION, POWDER, LYOPHILIZED, FOR SOLUTION INTRAVENOUS; PARENTERAL at 04:04

## 2017-04-22 RX ADMIN — PROMETHAZINE HYDROCHLORIDE AND CODEINE PHOSPHATE 5 ML: 6.25; 1 SYRUP ORAL at 10:04

## 2017-04-22 RX ADMIN — FLUTICASONE FUROATE AND VILANTEROL TRIFENATATE 1 PUFF: 100; 25 POWDER RESPIRATORY (INHALATION) at 10:04

## 2017-04-22 RX ADMIN — TRAZODONE HYDROCHLORIDE 100 MG: 50 TABLET ORAL at 08:04

## 2017-04-22 RX ADMIN — METHYLPREDNISOLONE SODIUM SUCCINATE 80 MG: 125 INJECTION, POWDER, FOR SOLUTION INTRAMUSCULAR; INTRAVENOUS at 01:04

## 2017-04-22 RX ADMIN — PIPERACILLIN AND TAZOBACTAM 4.5 G: 4; .5 INJECTION, POWDER, LYOPHILIZED, FOR SOLUTION INTRAVENOUS; PARENTERAL at 11:04

## 2017-04-22 RX ADMIN — METHYLPREDNISOLONE SODIUM SUCCINATE 80 MG: 125 INJECTION, POWDER, FOR SOLUTION INTRAMUSCULAR; INTRAVENOUS at 09:04

## 2017-04-22 NOTE — ASSESSMENT & PLAN NOTE
Continue moxifloxacin, steroids, and breathing treatments.   Probably bacterial, but unable to determine.  Would broaden ABx's coverage if any worsening of condition.

## 2017-04-22 NOTE — PROGRESS NOTES
[facility] Hospital Medicine  Progress Note    Patient Name: Alina Menchaca  MRN: [id number]  Patient Class: IP- Inpatient   Admission Date: 4/18/2017  Length of Stay: 3 days  Attending Physician: Arnel Adames MD  Primary Care Provider: Meghan Hanna MD        Subjective:     Principal Problem:Acute respiratory failure with hypoxia and hypercapnia    HPI:  Ms. Menchaca is a 45 yo woman with asthma, h/o seizures, anxiety and bipolar disorder who presented for cough and shortness of breath. Four days ago she started having shortness of breath and cough. Her coughing was so severe that she has not been able to sleep. She presented to the ER when her son noted her lips to be blue and checked her pulse ox which read in the 70s. She also reports fevers, wheezing, and pleuritic chest pain. She did drive to [place] 4/12/2017 and returned the day of presentation. Her mother passed away the day prior to presentation. In December, she was admitted for pneumonia and had to be intubated. She had since been doing well and does not require supplemental oxygen at home.    Hospital Course:  She was admitted for community-acquired pneumonia/asthma exacerbation and placed on moxifloxacin, methylprednisolone, and neb treatments. She was initially on BiPAP upon EMS arrival but was able to be weaned to nasal cannula in the ER without much difficulty. The night following admission her respiratory status worsened. She was noted to have increased infiltrates on CXR and an ABG of 7.3 pCO2 57 and pO2 of 64 on 100% non-rebreather. She was placed on rescue BiPAP and transferred to the ICU. Of note, she usually uses CPAP at home while sleeping but did not use one that night. She is also extremely anxious because of her mother's recent death. She improved with ativan and BiPAP. She was also given lasix 20mg IV due to the increased infiltrates on CXR.  4/21: Nocturnist called to evaluate patient last night because of  increased dyspnea.  Patient was stating that she needed to be intubated.  She refused ABG's.  Currently in ICU on high flow NC.      Interval History: Feeling about the same, but looks more comfortable.    Review of Systems   HENT: Negative for ear discharge and ear pain.    Eyes: Negative for pain and redness.   Endocrine: Negative for polyphagia and polyuria.   Genitourinary: Negative for difficulty urinating and dysuria.     Objective:     Vital Signs (Most Recent):  Temp: 98.8 °F (37.1 °C) (04/22/17 0715)  Pulse: 84 (04/22/17 1120)  Resp: (!) 29 (04/22/17 1120)  BP: 134/62 (04/22/17 0900)  SpO2: (!) 93 % (04/22/17 1120) Vital Signs (24h Range):  Temp:  [98 °F (36.7 °C)-98.8 °F (37.1 °C)] 98.8 °F (37.1 °C)  Pulse:  [] 84  Resp:  [20-42] 29  SpO2:  [85 %-100 %] 93 %  BP: (100-159)/(51-82) 134/62     Weight: 73.2 kg (161 lb 6 oz)  Body mass index is 31.52 kg/(m^2).    Intake/Output Summary (Last 24 hours) at 04/22/17 1235  Last data filed at 04/22/17 0715   Gross per 24 hour   Intake              690 ml   Output              625 ml   Net               65 ml      Physical Exam   Constitutional: She is oriented to person, place, and time. She appears well-developed and well-nourished. No distress.   HENT:   Right Ear: External ear normal.   Left Ear: External ear normal.   Nose: Nose normal.   Eyes: EOM are normal. Pupils are equal, round, and reactive to light. No scleral icterus.   Neck: Normal range of motion. Neck supple. No thyromegaly present.   Cardiovascular: Normal rate and normal heart sounds.  Exam reveals no friction rub.    No murmur heard.  Pulmonary/Chest:   Coarse BS bilaterally  Less tachypneic.   Abdominal: Soft. Bowel sounds are normal. She exhibits no mass. There is no tenderness.   No hepatosplenomegaly   Musculoskeletal: Normal range of motion. She exhibits no edema or deformity.   Neurological: She is alert and oriented to person, place, and time. No cranial nerve deficit.   Skin: Skin is  warm and dry. No pallor.       Significant Labs:   BMP:     Recent Labs  Lab 04/22/17  0239   *      K 4.4      CO2 33*   BUN 22*   CREATININE 0.7   CALCIUM 8.5*     CBC:     Recent Labs  Lab 04/21/17  0151 04/22/17  0239   WBC 11.15 7.40   HGB 9.6* 9.9*   HCT 29.6* 31.0*    194       Significant Imaging: I have reviewed and interpreted all pertinent imaging results/findings within the past 24 hours.    Assessment/Plan:      * Acute respiratory failure with hypoxia and hypercapnia  Due to pneumonia, asthma exacerbation.  Appreciate Pulmonary input.  Need to consider other etiologies.  Anxiety also probably contributing to dyspnea.  Cont abx, steroids, and nebs.   Currently on high flow NC.  CXR with some improvement.  Continue current management.    Sleep disturbance  Continue home trazodone QHS.    Tobacco abuse  Counseled on the importance of cessation. Nicotine patch.    Asthma exacerbation  As above    Bipolar 1 disorder  Appreciate Psych input.  Will follow recommended changes to medications.    Anemia of other chronic disease  Stable. Normocytic.    Moderate persistent asthma  Continue current management with steroids and nebulizer treatments.    Pneumonia of both lungs due to infectious organism  Continue moxifloxacin, steroids, and breathing treatments.   Probably bacterial, but unable to determine.  Would broaden ABx's coverage if any worsening of condition.    Bereavement reaction  Supportive care. I feel her anxiety and sorrow are attributing to her perceived symptoms however she clearly objective signs of acute respiratory failure.    Opioid use disorder, severe, dependence  Possible Suboxone withdrawal.  Supportive care.      Substance withdrawal  As above.      VTE Risk Mitigation         Ordered     enoxaparin injection 40 mg  Daily     Route:  Subcutaneous        04/21/17 1650     Medium Risk of VTE  Once      04/19/17 0248        Critical Care time spent > 35 minutes.        Arnel Adames MD  Department of Hospital Medicine   [facility]

## 2017-04-22 NOTE — CONSULTS
"Consult Note  Critical Care    Consult Requested By: Dr Adames  Reason for Consult: Hypoxia - bilateral pneumonia     SUBJECTIVE:     History of Present Illness:  Patient is a 46 y.o. female presents with a PMHx of asthma , Hx of sz, bipolar , and an episode of " pneumonia " in 12/16 . She was admitted to hosp on 4/18 with a chief complaint of SOB. She also reported some fever - that started 3 days PTA . Only pet is a dog - no birds . Does not work . Prior to admission she traveled to [place] - her mother passed away.   Currently she is feeling better . Still on HFNC     Review of patient's allergies indicates:  No Known Allergies    Past Medical History:   Diagnosis Date    Asthma     Bipolar 1 disorder     Manic depression     Seizures      Past Surgical History:   Procedure Laterality Date    APPENDECTOMY      CARPAL TUNNEL RELEASE      HERNIA REPAIR      HYSTERECTOMY       Family History   Problem Relation Age of Onset    Schizophrenia Mother     Diabetes Father     Heart disease Father      Social History   Substance Use Topics    Smoking status: Current Every Day Smoker     Packs/day: 1.00     Years: 30.00     Types: Cigarettes    Smokeless tobacco: None    Alcohol use No        Review of Systems:  Constitutional: positive for fevers  Eyes: no visual changes  ENT: no nasal congestion or sore throat  Respiratory: positive for dyspnea on exertion  Cardiovascular: no chest pain or palpitations  Gastrointestinal: no nausea or vomiting, no abdominal pain or change in bowel habits  Genitourinary: no hematuria or dysuria  Hematologic/Lymphatic: no easy bruising or lymphadenopathy  Neurological: no seizures or tremors    Prescriptions Prior to Admission   Medication Sig Dispense Refill Last Dose    albuterol (PROVENTIL) 2.5 mg /3 mL (0.083 %) nebulizer solution Take 3 mLs (2.5 mg total) by nebulization every 6 (six) hours as needed for Wheezing. 30 Box 0 4/18/2017    citalopram (CELEXA) 40 MG tablet " Take 40 mg by mouth once daily.   4/18/2017    FLUTICASONE/SALMETEROL (ADVAIR DISKUS INHL) Inhale into the lungs.   4/18/2017    furosemide (LASIX) 20 MG tablet Take 1 tablet (20 mg total) by mouth once daily. 30 tablet 0 4/17/2017    HYDROXYZINE PAMOATE (VISTARIL ORAL) Take by mouth as needed.    Past Week    oxycodone-acetaminophen (PERCOCET) 5-325 mg per tablet Take 1 tablet by mouth every 8 (eight) hours as needed for Pain (take 1-2 tablets by mouth every 8 hrs as needed for pain. Do not drink alcohol or drive while taking these. Do not take with other sedating medications.). 18 tablet 0 Past Week    trazodone (DESYREL) 100 MG tablet Take 100 mg by mouth every evening.   4/17/2017    alprazolam (XANAX) 0.5 MG tablet Take 1 tablet (0.5 mg total) by mouth 3 (three) times daily. (Patient taking differently: Take 0.5 mg by mouth 3 (three) times daily as needed. ) 15 tablet 0 Unknown    amantadine HCl (SYMMETREL) 100 mg capsule Take 100 mg by mouth 2 (two) times daily.   Unknown       Current Facility-Administered Medications   Medication    acetaminophen tablet 650 mg    albuterol-ipratropium 2.5mg-0.5mg/3mL nebulizer solution 3 mL    amantadine HCl capsule 100 mg    divalproex EC tablet 250 mg    duloxetine DR capsule 30 mg    enoxaparin injection 40 mg    fluticasone-vilanterol 100-25 mcg/dose diskus inhaler 1 puff    methylPREDNISolone sodium succinate injection 80 mg    moxifloxacin 400 mg/250 mL IVPB 400 mg    nicotine 21 mg/24 hr 1 patch    olanzapine injection 2.5 mg    oxycodone-acetaminophen 5-325 mg per tablet 1 tablet    pantoprazole injection 40 mg    piperacillin-tazobactam 4.5 g in sodium chloride 0.9% 100 mL IVPB (ready to mix system)    promethazine-codeine 6.25-10 mg/5 ml syrup 5 mL    senna-docusate 8.6-50 mg per tablet 1 tablet    sodium chloride 0.9% flush 3 mL    trazodone tablet 100 mg         OBJECTIVE:     Vital Signs (Most Recent)  Temp: 98.4 °F (36.9 °C) (04/22/17  0332)  Pulse: 85 (04/22/17 0500)  Resp: (!) 29 (04/22/17 0500)  BP: (!) 114/58 (04/22/17 0500)  SpO2: 96 % (04/22/17 0500)    Vital Signs Range (Last 24H):  Temp:  [97.7 °F (36.5 °C)-98.4 °F (36.9 °C)]   Pulse:  []   Resp:  [20-46]   BP: (100-159)/()   SpO2:  [85 %-100 %]   Ventilator Data (Last 24H):     Oxygen Concentration (%):  [90] 90    Hemodynamic Parameters (Last 24H):       Physical Exam:  General: Chronically ill appearing - appears to be older than stated age   Head: normocephalic, atraumatic  Eyes:  conjunctivae/corneas clear. PERRL.  Nose: Nares normal. Septum midline. Mucosa normal. No drainage or sinus tenderness., no discharge  Throat: lips, mucosa, and tongue normal; teeth and gums normal  Neck: supple, symmetrical, trachea midline, no JVD and thyroid not enlarged, symmetric, no tenderness/mass/nodules  Lungs:  Squeaks and rhonci B   Heart: S1, S2 normal  Abdomen: soft, non-tender non-distented; bowel sounds normal; no masses,  no organomegaly  Extremities: no cyanosis or edema, or clubbing  Skin: Skin color, texture, turgor normal. No rashes or lesions  Lymph Nodes: No cervical or supraclavicular adenopathy  Neurologic: Normal strength and tone. No focal numbness or weakness        Laboratory:  CBC:   Recent Labs  Lab 04/22/17 0239   WBC 7.40   RBC 3.39*   HGB 9.9*   HCT 31.0*      MCV 91   MCH 29.2   MCHC 31.9*     BMP:   Recent Labs  Lab 04/22/17 0239   *      K 4.4      CO2 33*   BUN 22*   CREATININE 0.7   CALCIUM 8.5*     CMP:   Recent Labs  Lab 04/22/17 0239   *   CALCIUM 8.5*   ALBUMIN 2.4*   PROT 5.5*      K 4.4   CO2 33*      BUN 22*   CREATININE 0.7   ALKPHOS 96   ALT 31   AST 27   BILITOT 0.3     ABGs:   Recent Labs  Lab 04/20/17  1138   PH 7.346*   PCO2 58.8*   PO2 200*   HCO3 32.2*   POCSATURATED 100   BE 5       Chest X-Ray: I personally reviewed the films and findings are:    Bilateral GGO infiltrates - does appear to be  improved from 4/20     ASSESSMENT/PLAN:     1) Hypoxia with Bilateral infiltrates - poss severe pneumonia . Reviewing her records - she had a similar episode in Dec . Would be unusual to two recent severe pneumonias - need to consider other processes . Will cont to treat as pneumonia - Abx zosyn and avelox , but need to consider other things . Revieweing records from 12/16 - in addition to Abx - she was also on steroids ( so she could have gotten better because of steroids rather than Abx ) Consider medication affects - reviewing home med list - the only one poss amantadine ( has been reported ([email or web address]) - can cause non cardiogenic pulm edema , ARDS , or DAD - diffuse alveolar damage ) - will DC amantadine . Other things AIP vs ( cryptogenic organizing pneumonia ) , eosinophic pneumonia ( CEP ) - her peripheral eosinophil count was fine, Acute HP - she denies any exposure. - Her CXR looks a little better - she says she feels better . If she does not improve - then may require a lung Bx .   2) Bipolar   3) Tob - encourage smoking cessation   4) Proph

## 2017-04-22 NOTE — ASSESSMENT & PLAN NOTE
Due to pneumonia, asthma exacerbation.  Appreciate Pulmonary input.  Need to consider other etiologies.  Anxiety also probably contributing to dyspnea.  Cont abx, steroids, and nebs.   Currently on high flow NC.  CXR with some improvement.  Continue current management.

## 2017-04-22 NOTE — PLAN OF CARE
Problem: Patient Care Overview  Goal: Plan of Care Review  Outcome: Ongoing (interventions implemented as appropriate)  Patient remained in ICU overnight on Hi flow nasal cannula 15L, O2 sats >88%. AAOx4, Afebrile, VSS. Patient slept comfortably throughout night, no complaints of anxiety. Zosyn started. Percocet given once for pain. Urinated twice on bedpan, no BM. No falls, injuries, or skin breakdown throughout shift.

## 2017-04-22 NOTE — PLAN OF CARE
Problem: Fall Risk (Adult)  Goal: Identify Related Risk Factors and Signs and Symptoms  Related risk factors and signs and symptoms are identified upon initiation of Human Response Clinical Practice Guideline (CPG)   Outcome: Ongoing (interventions implemented as appropriate)  Patient continues in ICU, high flow moved to bi-pap after patient was observed to be tachypnic while sleeping.  Patient sleeps for most of shift. Some periods of emotional behavior, including crying. SOB and increase labor of breathing with position changes and use of bedpan. Daughter at bedside for end of shift. No new sign of injury or fall, will continue to monitor.

## 2017-04-22 NOTE — SUBJECTIVE & OBJECTIVE
Interval History: Feeling about the same, but looks more comfortable.    Review of Systems   HENT: Negative for ear discharge and ear pain.    Eyes: Negative for pain and redness.   Endocrine: Negative for polyphagia and polyuria.   Genitourinary: Negative for difficulty urinating and dysuria.     Objective:     Vital Signs (Most Recent):  Temp: 98.8 °F (37.1 °C) (04/22/17 0715)  Pulse: 84 (04/22/17 1120)  Resp: (!) 29 (04/22/17 1120)  BP: 134/62 (04/22/17 0900)  SpO2: (!) 93 % (04/22/17 1120) Vital Signs (24h Range):  Temp:  [98 °F (36.7 °C)-98.8 °F (37.1 °C)] 98.8 °F (37.1 °C)  Pulse:  [] 84  Resp:  [20-42] 29  SpO2:  [85 %-100 %] 93 %  BP: (100-159)/(51-82) 134/62     Weight: 73.2 kg (161 lb 6 oz)  Body mass index is 31.52 kg/(m^2).    Intake/Output Summary (Last 24 hours) at 04/22/17 1235  Last data filed at 04/22/17 0715   Gross per 24 hour   Intake              690 ml   Output              625 ml   Net               65 ml      Physical Exam   Constitutional: She is oriented to person, place, and time. She appears well-developed and well-nourished. No distress.   HENT:   Right Ear: External ear normal.   Left Ear: External ear normal.   Nose: Nose normal.   Eyes: EOM are normal. Pupils are equal, round, and reactive to light. No scleral icterus.   Neck: Normal range of motion. Neck supple. No thyromegaly present.   Cardiovascular: Normal rate and normal heart sounds.  Exam reveals no friction rub.    No murmur heard.  Pulmonary/Chest:   Coarse BS bilaterally  Less tachypneic.   Abdominal: Soft. Bowel sounds are normal. She exhibits no mass. There is no tenderness.   No hepatosplenomegaly   Musculoskeletal: Normal range of motion. She exhibits no edema or deformity.   Neurological: She is alert and oriented to person, place, and time. No cranial nerve deficit.   Skin: Skin is warm and dry. No pallor.       Significant Labs:   BMP:     Recent Labs  Lab 04/22/17  0239   *      K 4.4       CO2 33*   BUN 22*   CREATININE 0.7   CALCIUM 8.5*     CBC:     Recent Labs  Lab 04/21/17  0151 04/22/17  0239   WBC 11.15 7.40   HGB 9.6* 9.9*   HCT 29.6* 31.0*    194       Significant Imaging: I have reviewed and interpreted all pertinent imaging results/findings within the past 24 hours.

## 2017-04-23 LAB
ALBUMIN SERPL BCP-MCNC: 2.4 G/DL
ALP SERPL-CCNC: 94 U/L
ALT SERPL W/O P-5'-P-CCNC: 29 U/L
ANION GAP SERPL CALC-SCNC: 5 MMOL/L
AST SERPL-CCNC: 18 U/L
BASOPHILS # BLD AUTO: 0.01 K/UL
BASOPHILS NFR BLD: 0.1 %
BILIRUB SERPL-MCNC: 0.3 MG/DL
BUN SERPL-MCNC: 16 MG/DL
CALCIUM SERPL-MCNC: 8.3 MG/DL
CHLORIDE SERPL-SCNC: 102 MMOL/L
CO2 SERPL-SCNC: 36 MMOL/L
CREAT SERPL-MCNC: 0.7 MG/DL
DIFFERENTIAL METHOD: ABNORMAL
EOSINOPHIL # BLD AUTO: 0 K/UL
EOSINOPHIL NFR BLD: 0 %
ERYTHROCYTE [DISTWIDTH] IN BLOOD BY AUTOMATED COUNT: 13.4 %
EST. GFR  (AFRICAN AMERICAN): >60 ML/MIN/1.73 M^2
EST. GFR  (NON AFRICAN AMERICAN): >60 ML/MIN/1.73 M^2
GLUCOSE SERPL-MCNC: 160 MG/DL
HCT VFR BLD AUTO: 31.4 %
HGB BLD-MCNC: 10 G/DL
LYMPHOCYTES # BLD AUTO: 0.3 K/UL
LYMPHOCYTES NFR BLD: 3.5 %
MCH RBC QN AUTO: 29.3 PG
MCHC RBC AUTO-ENTMCNC: 31.8 %
MCV RBC AUTO: 92 FL
MONOCYTES # BLD AUTO: 0.2 K/UL
MONOCYTES NFR BLD: 3.1 %
NEUTROPHILS # BLD AUTO: 7.2 K/UL
NEUTROPHILS NFR BLD: 93.3 %
PLATELET # BLD AUTO: 180 K/UL
PMV BLD AUTO: 9.1 FL
POTASSIUM SERPL-SCNC: 4.6 MMOL/L
PROT SERPL-MCNC: 5.4 G/DL
RBC # BLD AUTO: 3.41 M/UL
SODIUM SERPL-SCNC: 143 MMOL/L
WBC # BLD AUTO: 7.73 K/UL

## 2017-04-23 PROCEDURE — 36415 COLL VENOUS BLD VENIPUNCTURE: CPT

## 2017-04-23 PROCEDURE — 25000242 PHARM REV CODE 250 ALT 637 W/ HCPCS: Performed by: EMERGENCY MEDICINE

## 2017-04-23 PROCEDURE — 80053 COMPREHEN METABOLIC PANEL: CPT

## 2017-04-23 PROCEDURE — 25000003 PHARM REV CODE 250: Performed by: INTERNAL MEDICINE

## 2017-04-23 PROCEDURE — 20000000 HC ICU ROOM

## 2017-04-23 PROCEDURE — 25000003 PHARM REV CODE 250: Performed by: EMERGENCY MEDICINE

## 2017-04-23 PROCEDURE — 94640 AIRWAY INHALATION TREATMENT: CPT

## 2017-04-23 PROCEDURE — 63600175 PHARM REV CODE 636 W HCPCS: Performed by: INTERNAL MEDICINE

## 2017-04-23 PROCEDURE — 63600175 PHARM REV CODE 636 W HCPCS: Performed by: EMERGENCY MEDICINE

## 2017-04-23 PROCEDURE — 27100171 HC OXYGEN HIGH FLOW UP TO 24 HOURS

## 2017-04-23 PROCEDURE — 63600175 PHARM REV CODE 636 W HCPCS: Performed by: HOSPITALIST

## 2017-04-23 PROCEDURE — 85025 COMPLETE CBC W/AUTO DIFF WBC: CPT

## 2017-04-23 PROCEDURE — 25000003 PHARM REV CODE 250: Performed by: HOSPITALIST

## 2017-04-23 PROCEDURE — 99900035 HC TECH TIME PER 15 MIN (STAT)

## 2017-04-23 PROCEDURE — C9113 INJ PANTOPRAZOLE SODIUM, VIA: HCPCS | Performed by: INTERNAL MEDICINE

## 2017-04-23 PROCEDURE — 27000221 HC OXYGEN, UP TO 24 HOURS

## 2017-04-23 RX ADMIN — DOCUSATE SODIUM AND SENNOSIDES 1 TABLET: 8.6; 5 TABLET, FILM COATED ORAL at 08:04

## 2017-04-23 RX ADMIN — PANTOPRAZOLE SODIUM 40 MG: 40 INJECTION, POWDER, FOR SOLUTION INTRAVENOUS at 08:04

## 2017-04-23 RX ADMIN — OLANZAPINE 2.5 MG: 10 INJECTION, POWDER, LYOPHILIZED, FOR SOLUTION INTRAMUSCULAR at 08:04

## 2017-04-23 RX ADMIN — OXYCODONE HYDROCHLORIDE AND ACETAMINOPHEN 1 TABLET: 5; 325 TABLET ORAL at 12:04

## 2017-04-23 RX ADMIN — IPRATROPIUM BROMIDE AND ALBUTEROL SULFATE 3 ML: .5; 3 SOLUTION RESPIRATORY (INHALATION) at 03:04

## 2017-04-23 RX ADMIN — IPRATROPIUM BROMIDE AND ALBUTEROL SULFATE 3 ML: .5; 3 SOLUTION RESPIRATORY (INHALATION) at 08:04

## 2017-04-23 RX ADMIN — PROMETHAZINE HYDROCHLORIDE AND CODEINE PHOSPHATE 5 ML: 6.25; 1 SYRUP ORAL at 07:04

## 2017-04-23 RX ADMIN — Medication 10 ML: at 06:04

## 2017-04-23 RX ADMIN — OLANZAPINE 2.5 MG: 10 INJECTION, POWDER, LYOPHILIZED, FOR SOLUTION INTRAMUSCULAR at 09:04

## 2017-04-23 RX ADMIN — METHYLPREDNISOLONE SODIUM SUCCINATE 80 MG: 125 INJECTION, POWDER, FOR SOLUTION INTRAMUSCULAR; INTRAVENOUS at 06:04

## 2017-04-23 RX ADMIN — PIPERACILLIN AND TAZOBACTAM 4.5 G: 4; .5 INJECTION, POWDER, LYOPHILIZED, FOR SOLUTION INTRAVENOUS; PARENTERAL at 12:04

## 2017-04-23 RX ADMIN — DIVALPROEX SODIUM 250 MG: 250 TABLET, DELAYED RELEASE ORAL at 09:04

## 2017-04-23 RX ADMIN — DIVALPROEX SODIUM 250 MG: 250 TABLET, DELAYED RELEASE ORAL at 06:04

## 2017-04-23 RX ADMIN — IPRATROPIUM BROMIDE AND ALBUTEROL SULFATE 3 ML: .5; 3 SOLUTION RESPIRATORY (INHALATION) at 04:04

## 2017-04-23 RX ADMIN — ENOXAPARIN SODIUM 40 MG: 100 INJECTION SUBCUTANEOUS at 05:04

## 2017-04-23 RX ADMIN — DIVALPROEX SODIUM 250 MG: 250 TABLET, DELAYED RELEASE ORAL at 05:04

## 2017-04-23 RX ADMIN — PROMETHAZINE HYDROCHLORIDE AND CODEINE PHOSPHATE 5 ML: 6.25; 1 SYRUP ORAL at 12:04

## 2017-04-23 RX ADMIN — PIPERACILLIN AND TAZOBACTAM 4.5 G: 4; .5 INJECTION, POWDER, LYOPHILIZED, FOR SOLUTION INTRAVENOUS; PARENTERAL at 07:04

## 2017-04-23 RX ADMIN — METHYLPREDNISOLONE SODIUM SUCCINATE 125 MG: 125 INJECTION, POWDER, FOR SOLUTION INTRAMUSCULAR; INTRAVENOUS at 09:04

## 2017-04-23 RX ADMIN — FLUTICASONE FUROATE AND VILANTEROL TRIFENATATE 1 PUFF: 100; 25 POWDER RESPIRATORY (INHALATION) at 09:04

## 2017-04-23 RX ADMIN — METHYLPREDNISOLONE SODIUM SUCCINATE 80 MG: 125 INJECTION, POWDER, FOR SOLUTION INTRAMUSCULAR; INTRAVENOUS at 02:04

## 2017-04-23 RX ADMIN — Medication 10 ML: at 09:04

## 2017-04-23 RX ADMIN — PROMETHAZINE HYDROCHLORIDE AND CODEINE PHOSPHATE 5 ML: 6.25; 1 SYRUP ORAL at 08:04

## 2017-04-23 RX ADMIN — PIPERACILLIN AND TAZOBACTAM 4.5 G: 4; .5 INJECTION, POWDER, LYOPHILIZED, FOR SOLUTION INTRAVENOUS; PARENTERAL at 04:04

## 2017-04-23 RX ADMIN — IPRATROPIUM BROMIDE AND ALBUTEROL SULFATE 3 ML: .5; 3 SOLUTION RESPIRATORY (INHALATION) at 12:04

## 2017-04-23 RX ADMIN — OXYCODONE HYDROCHLORIDE AND ACETAMINOPHEN 1 TABLET: 5; 325 TABLET ORAL at 07:04

## 2017-04-23 RX ADMIN — OLANZAPINE 2.5 MG: 10 INJECTION, POWDER, LYOPHILIZED, FOR SOLUTION INTRAMUSCULAR at 05:04

## 2017-04-23 RX ADMIN — DULOXETINE 30 MG: 30 CAPSULE, DELAYED RELEASE ORAL at 08:04

## 2017-04-23 RX ADMIN — MOXIFLOXACIN HYDROCHLORIDE 400 MG: 400 INJECTION, SOLUTION INTRAVENOUS at 11:04

## 2017-04-23 RX ADMIN — NICOTINE 1 PATCH: 21 PATCH, EXTENDED RELEASE TRANSDERMAL at 09:04

## 2017-04-23 RX ADMIN — TRAZODONE HYDROCHLORIDE 100 MG: 50 TABLET ORAL at 08:04

## 2017-04-23 RX ADMIN — IPRATROPIUM BROMIDE AND ALBUTEROL SULFATE 3 ML: .5; 3 SOLUTION RESPIRATORY (INHALATION) at 11:04

## 2017-04-23 RX ADMIN — Medication 3 ML: at 06:04

## 2017-04-23 NOTE — SUBJECTIVE & OBJECTIVE
Interval History: Sleeping this morning.  No major events overnight.    Review of Systems   HENT: Negative for ear discharge and ear pain.    Eyes: Negative for pain and redness.   Endocrine: Negative for polyphagia and polyuria.   Genitourinary: Negative for difficulty urinating and dysuria.     Objective:     Vital Signs (Most Recent):  Temp: 98.2 °F (36.8 °C) (04/23/17 0400)  Pulse: 75 (04/23/17 0807)  Resp: 20 (04/23/17 0807)  BP: (!) 151/74 (04/23/17 0703)  SpO2: 100 % (04/23/17 0807) Vital Signs (24h Range):  Temp:  [97.9 °F (36.6 °C)-98.2 °F (36.8 °C)] 98.2 °F (36.8 °C)  Pulse:  [] 75  Resp:  [14-65] 20  SpO2:  [88 %-100 %] 100 %  BP: (123-157)/(59-75) 151/74     Weight: 73.2 kg (161 lb 6 oz)  Body mass index is 31.52 kg/(m^2).    Intake/Output Summary (Last 24 hours) at 04/23/17 1139  Last data filed at 04/23/17 0427   Gross per 24 hour   Intake              450 ml   Output                0 ml   Net              450 ml      Physical Exam   Constitutional: She is oriented to person, place, and time. She appears well-developed and well-nourished. No distress.   HENT:   Right Ear: External ear normal.   Left Ear: External ear normal.   Nose: Nose normal.   Eyes: EOM are normal. Pupils are equal, round, and reactive to light. No scleral icterus.   Neck: Normal range of motion. Neck supple. No thyromegaly present.   Cardiovascular: Normal rate and normal heart sounds.  Exam reveals no friction rub.    No murmur heard.  Pulmonary/Chest:   Coarse BS bilaterally  Less tachypneic.   Abdominal: Soft. Bowel sounds are normal. She exhibits no mass. There is no tenderness.   No hepatosplenomegaly   Musculoskeletal: Normal range of motion. She exhibits no edema or deformity.   Neurological: She is alert and oriented to person, place, and time. No cranial nerve deficit.   Skin: Skin is warm and dry. No pallor.       Significant Labs:   BMP:     Recent Labs  Lab 04/23/17  0223   *      K 4.6       CO2 36*   BUN 16   CREATININE 0.7   CALCIUM 8.3*     CBC:     Recent Labs  Lab 04/22/17  0239 04/23/17  0224   WBC 7.40 7.73   HGB 9.9* 10.0*   HCT 31.0* 31.4*    180       Significant Imaging: I have reviewed and interpreted all pertinent imaging results/findings within the past 24 hours.

## 2017-04-23 NOTE — ASSESSMENT & PLAN NOTE
Continue moxifloxacin, steroids, and breathing treatments.   Probably bacterial, but unable to determine.  Zosyn added to regimen.

## 2017-04-23 NOTE — PROGRESS NOTES
[facility] Hospital Medicine  Progress Note    Patient Name: Alina Menchaca  MRN: [id number]  Patient Class: IP- Inpatient   Admission Date: 4/18/2017  Length of Stay: 4 days  Attending Physician: Arnel Adames MD  Primary Care Provider: Meghan Hanna MD        Subjective:     Principal Problem:Acute respiratory failure with hypoxia and hypercapnia    HPI:  Ms. Menchaca is a 45 yo woman with asthma, h/o seizures, anxiety and bipolar disorder who presented for cough and shortness of breath. Four days ago she started having shortness of breath and cough. Her coughing was so severe that she has not been able to sleep. She presented to the ER when her son noted her lips to be blue and checked her pulse ox which read in the 70s. She also reports fevers, wheezing, and pleuritic chest pain. She did drive to [place] 4/12/2017 and returned the day of presentation. Her mother passed away the day prior to presentation. In December, she was admitted for pneumonia and had to be intubated. She had since been doing well and does not require supplemental oxygen at home.    Hospital Course:  She was admitted for community-acquired pneumonia/asthma exacerbation and placed on moxifloxacin, methylprednisolone, and neb treatments. She was initially on BiPAP upon EMS arrival but was able to be weaned to nasal cannula in the ER without much difficulty. The night following admission her respiratory status worsened. She was noted to have increased infiltrates on CXR and an ABG of 7.3 pCO2 57 and pO2 of 64 on 100% non-rebreather. She was placed on rescue BiPAP and transferred to the ICU. Of note, she usually uses CPAP at home while sleeping but did not use one that night. She is also extremely anxious because of her mother's recent death. She improved with ativan and BiPAP. She was also given lasix 20mg IV due to the increased infiltrates on CXR.  4/21: Nocturnist called to evaluate patient last night because of  increased dyspnea.  Patient was stating that she needed to be intubated.  She refused ABG's.  Currently in ICU on high flow NC.      Interval History: Sleeping this morning.  No major events overnight.    Review of Systems   HENT: Negative for ear discharge and ear pain.    Eyes: Negative for pain and redness.   Endocrine: Negative for polyphagia and polyuria.   Genitourinary: Negative for difficulty urinating and dysuria.     Objective:     Vital Signs (Most Recent):  Temp: 98.2 °F (36.8 °C) (04/23/17 0400)  Pulse: 75 (04/23/17 0807)  Resp: 20 (04/23/17 0807)  BP: (!) 151/74 (04/23/17 0703)  SpO2: 100 % (04/23/17 0807) Vital Signs (24h Range):  Temp:  [97.9 °F (36.6 °C)-98.2 °F (36.8 °C)] 98.2 °F (36.8 °C)  Pulse:  [] 75  Resp:  [14-65] 20  SpO2:  [88 %-100 %] 100 %  BP: (123-157)/(59-75) 151/74     Weight: 73.2 kg (161 lb 6 oz)  Body mass index is 31.52 kg/(m^2).    Intake/Output Summary (Last 24 hours) at 04/23/17 1139  Last data filed at 04/23/17 0427   Gross per 24 hour   Intake              450 ml   Output                0 ml   Net              450 ml      Physical Exam   Constitutional: She is oriented to person, place, and time. She appears well-developed and well-nourished. No distress.   HENT:   Right Ear: External ear normal.   Left Ear: External ear normal.   Nose: Nose normal.   Eyes: EOM are normal. Pupils are equal, round, and reactive to light. No scleral icterus.   Neck: Normal range of motion. Neck supple. No thyromegaly present.   Cardiovascular: Normal rate and normal heart sounds.  Exam reveals no friction rub.    No murmur heard.  Pulmonary/Chest:   Coarse BS bilaterally  Less tachypneic.   Abdominal: Soft. Bowel sounds are normal. She exhibits no mass. There is no tenderness.   No hepatosplenomegaly   Musculoskeletal: Normal range of motion. She exhibits no edema or deformity.   Neurological: She is alert and oriented to person, place, and time. No cranial nerve deficit.   Skin: Skin is  warm and dry. No pallor.       Significant Labs:   BMP:     Recent Labs  Lab 04/23/17  0223   *      K 4.6      CO2 36*   BUN 16   CREATININE 0.7   CALCIUM 8.3*     CBC:     Recent Labs  Lab 04/22/17  0239 04/23/17 0224   WBC 7.40 7.73   HGB 9.9* 10.0*   HCT 31.0* 31.4*    180       Significant Imaging: I have reviewed and interpreted all pertinent imaging results/findings within the past 24 hours.    Assessment/Plan:      * Acute respiratory failure with hypoxia and hypercapnia  Due to pneumonia, asthma exacerbation.  Appreciate Pulmonary input.  Need to consider other etiologies.  Anxiety also probably contributing to dyspnea.  Cont abx, steroids, and nebs.   Currently on high flow NC.  CXR with some improvement.  Continue current management.    Sleep disturbance  Continue home trazodone QHS.    Tobacco abuse  Counseled on the importance of cessation. Nicotine patch.    Asthma exacerbation  As above    Bipolar 1 disorder  Appreciate Psych input.  Will follow recommended changes to medications.    Anemia of other chronic disease  Stable. Normocytic.    Moderate persistent asthma  Continue current management with steroids and nebulizer treatments.    Pneumonia of both lungs due to infectious organism  Continue moxifloxacin, steroids, and breathing treatments.   Probably bacterial, but unable to determine.  Zosyn added to regimen.    Bereavement reaction  Supportive care. I feel her anxiety and sorrow are attributing to her perceived symptoms however she clearly objective signs of acute respiratory failure.    Opioid use disorder, severe, dependence  Possible Suboxone withdrawal.  Supportive care.      Substance withdrawal  As above.      VTE Risk Mitigation         Ordered     enoxaparin injection 40 mg  Daily     Route:  Subcutaneous        04/21/17 1650     Medium Risk of VTE  Once      04/19/17 0248        Critical Care time spent > 40 minutes.       Arnel Adames MD  Department of  Hospital Medicine [facility]

## 2017-04-23 NOTE — PROGRESS NOTES
Progress Note  Pulmonology    Admit Date: 4/18/2017   LOS: 4 days     SUBJECTIVE:     Follow-up For: Bilateral pneumonia   Does not feel well . + non productive cough . Still SOB - currently on HFNC         Continuous Infusions:   Scheduled Meds:   albuterol-ipratropium 2.5mg-0.5mg/3mL  3 mL Nebulization Q4H    divalproex  250 mg Oral Q8H    duloxetine  30 mg Oral Daily    enoxaparin  40 mg Subcutaneous Daily    fluticasone-vilanterol  1 puff Inhalation Daily    methylPREDNISolone sodium succinate  80 mg Intravenous Q8H    moxifloxacin  400 mg Intravenous Q24H    nicotine  1 patch Transdermal Daily    pantoprazole  40 mg Intravenous Daily    piperacillin-tazobactam 4.5 g in sodium chloride 0.9% 100 mL IVPB (ready to mix system)  4.5 g Intravenous Q8H    senna-docusate 8.6-50 mg  1 tablet Oral BID    sodium chloride 0.9%  3 mL Intravenous Q8H    trazodone  100 mg Oral QHS       Review of Systems:  Constitutional: no fever or chills  Respiratory: positive for cough and dyspnea on exertion  Cardiovascular: no chest pain or palpitations    OBJECTIVE:     Vital Signs Range (Last 24H):  Temp:  [97.2 °F (36.2 °C)-98.2 °F (36.8 °C)]   Pulse:  []   Resp:  [14-65]   BP: (108-157)/(59-75)   SpO2:  [88 %-100 %]     I & O (Last 24H):  Intake/Output Summary (Last 24 hours) at 04/23/17 0790  Last data filed at 04/23/17 0427   Gross per 24 hour   Intake              550 ml   Output                0 ml   Net              550 ml           Physical Exam:  General: mild distress  Neck: no adenopathy  Lungs:  normal respiratory effort, normal percussion bilaterally and + rhonci B   Heart: regular rate and rhythm and no murmur  Abdomen: soft, non-tender non-distended; bowel sounds normal  Extremities: no cyanosis or edema, or clubbing  Neurologic: alert, oriented, thought content appropriate    Laboratory:  CBC:   Recent Labs  Lab 04/23/17  0224   WBC 7.73   RBC 3.41*   HGB 10.0*   HCT 31.4*      MCV 92   MCH  29.3   MCHC 31.8*     BMP:   Recent Labs  Lab 04/23/17 0223   *      K 4.6      CO2 36*   BUN 16   CREATININE 0.7   CALCIUM 8.3*     CMP:   Recent Labs  Lab 04/23/17 0223   *   CALCIUM 8.3*   ALBUMIN 2.4*   PROT 5.4*      K 4.6   CO2 36*      BUN 16   CREATININE 0.7   ALKPHOS 94   ALT 29   AST 18   BILITOT 0.3       Chest X-Ray: I personally reviewed the films and findings are:    No new CXR     ASSESSMENT/PLAN:     Active Hospital Problems    Diagnosis  POA    *Acute respiratory failure with hypoxia and hypercapnia [J96.01, J96.02]  Yes    Opioid use disorder, severe, dependence [F11.20]  Yes    Substance withdrawal [F19.239]  Yes    Bereavement reaction [F43.20, Z63.4]  Not Applicable    Pneumonia of both lungs due to infectious organism [J18.9]  Yes    Moderate persistent asthma [J45.40]  Yes     Chronic    Anemia of other chronic disease [D63.8]  Yes    Tobacco abuse [Z72.0]  Yes     Chronic    Bipolar 1 disorder [F31.9]  Yes     Chronic    Asthma exacerbation [J45.901]  Yes    Sleep disturbance [G47.9]  Yes      Resolved Hospital Problems    Diagnosis Date Resolved POA   No resolved problems to display.       1) Hypoxia with Bilateral infiltrates - poss severe pneumonia . Reviewing her records - she had a similar episode in Dec . Would be unusual to two recent severe pneumonias - need to consider other processes . Will cont to treat as pneumonia - Abx zosyn and avelox , but need to consider other things . With her opioid use disorder - also consider aspiration . DDx Bilateral pneumonia vs aspiration vs drug reaction vs other - On Abx , steroids - if does not improve may require a Bx   2) Bipolar   3) Tob - encourage smoking cessation   4) Proph

## 2017-04-23 NOTE — NURSING
Patient given a PRN dose of 5mg oxycodone at same time as scheduled lovenox; barcode discarded before it could be scanned.

## 2017-04-24 LAB
ALBUMIN SERPL BCP-MCNC: 2.4 G/DL
ALP SERPL-CCNC: 97 U/L
ALT SERPL W/O P-5'-P-CCNC: 24 U/L
ANION GAP SERPL CALC-SCNC: 8 MMOL/L
ANISOCYTOSIS BLD QL SMEAR: SLIGHT
AST SERPL-CCNC: 14 U/L
BACTERIA BLD CULT: NORMAL
BACTERIA BLD CULT: NORMAL
BASO STIPL BLD QL SMEAR: ABNORMAL
BASOPHILS NFR BLD: 0 %
BILIRUB SERPL-MCNC: 0.3 MG/DL
BUN SERPL-MCNC: 16 MG/DL
CALCIUM SERPL-MCNC: 8.3 MG/DL
CHLORIDE SERPL-SCNC: 99 MMOL/L
CO2 SERPL-SCNC: 37 MMOL/L
CREAT SERPL-MCNC: 0.7 MG/DL
DIFFERENTIAL METHOD: ABNORMAL
EOSINOPHIL NFR BLD: 0 %
ERYTHROCYTE [DISTWIDTH] IN BLOOD BY AUTOMATED COUNT: 13.2 %
EST. GFR  (AFRICAN AMERICAN): >60 ML/MIN/1.73 M^2
EST. GFR  (NON AFRICAN AMERICAN): >60 ML/MIN/1.73 M^2
GLUCOSE SERPL-MCNC: 191 MG/DL
HCT VFR BLD AUTO: 32.3 %
HGB BLD-MCNC: 10.1 G/DL
LYMPHOCYTES NFR BLD: 1 %
MCH RBC QN AUTO: 28.8 PG
MCHC RBC AUTO-ENTMCNC: 31.3 %
MCV RBC AUTO: 92 FL
METAMYELOCYTES NFR BLD MANUAL: 1 %
MONOCYTES NFR BLD: 1 %
NEUTROPHILS NFR BLD: 88 %
NEUTS BAND NFR BLD MANUAL: 9 %
OVALOCYTES BLD QL SMEAR: ABNORMAL
PLATELET # BLD AUTO: 208 K/UL
PMV BLD AUTO: 8.8 FL
POIKILOCYTOSIS BLD QL SMEAR: SLIGHT
POLYCHROMASIA BLD QL SMEAR: ABNORMAL
POTASSIUM SERPL-SCNC: 4.4 MMOL/L
PROT SERPL-MCNC: 5.2 G/DL
RBC # BLD AUTO: 3.51 M/UL
SODIUM SERPL-SCNC: 144 MMOL/L
WBC # BLD AUTO: 9.28 K/UL

## 2017-04-24 PROCEDURE — 94660 CPAP INITIATION&MGMT: CPT

## 2017-04-24 PROCEDURE — 94640 AIRWAY INHALATION TREATMENT: CPT

## 2017-04-24 PROCEDURE — 63600175 PHARM REV CODE 636 W HCPCS: Performed by: HOSPITALIST

## 2017-04-24 PROCEDURE — 25000003 PHARM REV CODE 250: Performed by: EMERGENCY MEDICINE

## 2017-04-24 PROCEDURE — 27000221 HC OXYGEN, UP TO 24 HOURS

## 2017-04-24 PROCEDURE — C9113 INJ PANTOPRAZOLE SODIUM, VIA: HCPCS | Performed by: INTERNAL MEDICINE

## 2017-04-24 PROCEDURE — 63600175 PHARM REV CODE 636 W HCPCS: Performed by: INTERNAL MEDICINE

## 2017-04-24 PROCEDURE — 25000003 PHARM REV CODE 250: Performed by: INTERNAL MEDICINE

## 2017-04-24 PROCEDURE — 36415 COLL VENOUS BLD VENIPUNCTURE: CPT

## 2017-04-24 PROCEDURE — 94799 UNLISTED PULMONARY SVC/PX: CPT

## 2017-04-24 PROCEDURE — 63600175 PHARM REV CODE 636 W HCPCS: Performed by: EMERGENCY MEDICINE

## 2017-04-24 PROCEDURE — 27100171 HC OXYGEN HIGH FLOW UP TO 24 HOURS

## 2017-04-24 PROCEDURE — 80053 COMPREHEN METABOLIC PANEL: CPT

## 2017-04-24 PROCEDURE — 85007 BL SMEAR W/DIFF WBC COUNT: CPT

## 2017-04-24 PROCEDURE — 85027 COMPLETE CBC AUTOMATED: CPT

## 2017-04-24 PROCEDURE — 25000242 PHARM REV CODE 250 ALT 637 W/ HCPCS: Performed by: EMERGENCY MEDICINE

## 2017-04-24 PROCEDURE — 20000000 HC ICU ROOM

## 2017-04-24 PROCEDURE — 94761 N-INVAS EAR/PLS OXIMETRY MLT: CPT

## 2017-04-24 PROCEDURE — 99900035 HC TECH TIME PER 15 MIN (STAT)

## 2017-04-24 PROCEDURE — 25000003 PHARM REV CODE 250: Performed by: HOSPITALIST

## 2017-04-24 RX ORDER — PANTOPRAZOLE SODIUM 40 MG/1
40 TABLET, DELAYED RELEASE ORAL DAILY
Status: DISCONTINUED | OUTPATIENT
Start: 2017-04-25 | End: 2017-04-27 | Stop reason: HOSPADM

## 2017-04-24 RX ORDER — PREDNISONE 20 MG/1
40 TABLET ORAL DAILY
Status: DISCONTINUED | OUTPATIENT
Start: 2017-04-24 | End: 2017-04-26

## 2017-04-24 RX ORDER — TRAZODONE HYDROCHLORIDE 50 MG/1
100 TABLET ORAL NIGHTLY
Status: DISCONTINUED | OUTPATIENT
Start: 2017-04-24 | End: 2017-04-27 | Stop reason: HOSPADM

## 2017-04-24 RX ADMIN — Medication 3 ML: at 03:04

## 2017-04-24 RX ADMIN — PIPERACILLIN AND TAZOBACTAM 4.5 G: 4; .5 INJECTION, POWDER, LYOPHILIZED, FOR SOLUTION INTRAVENOUS; PARENTERAL at 04:04

## 2017-04-24 RX ADMIN — PIPERACILLIN AND TAZOBACTAM 4.5 G: 4; .5 INJECTION, POWDER, LYOPHILIZED, FOR SOLUTION INTRAVENOUS; PARENTERAL at 11:04

## 2017-04-24 RX ADMIN — IPRATROPIUM BROMIDE AND ALBUTEROL SULFATE 3 ML: .5; 3 SOLUTION RESPIRATORY (INHALATION) at 11:04

## 2017-04-24 RX ADMIN — PROMETHAZINE HYDROCHLORIDE AND CODEINE PHOSPHATE 5 ML: 6.25; 1 SYRUP ORAL at 03:04

## 2017-04-24 RX ADMIN — DIVALPROEX SODIUM 250 MG: 250 TABLET, DELAYED RELEASE ORAL at 03:04

## 2017-04-24 RX ADMIN — IPRATROPIUM BROMIDE AND ALBUTEROL SULFATE 3 ML: .5; 3 SOLUTION RESPIRATORY (INHALATION) at 03:04

## 2017-04-24 RX ADMIN — DIVALPROEX SODIUM 250 MG: 250 TABLET, DELAYED RELEASE ORAL at 09:04

## 2017-04-24 RX ADMIN — DOCUSATE SODIUM AND SENNOSIDES 1 TABLET: 8.6; 5 TABLET, FILM COATED ORAL at 08:04

## 2017-04-24 RX ADMIN — OXYCODONE HYDROCHLORIDE AND ACETAMINOPHEN 1 TABLET: 5; 325 TABLET ORAL at 08:04

## 2017-04-24 RX ADMIN — PANTOPRAZOLE SODIUM 40 MG: 40 INJECTION, POWDER, FOR SOLUTION INTRAVENOUS at 08:04

## 2017-04-24 RX ADMIN — IPRATROPIUM BROMIDE AND ALBUTEROL SULFATE 3 ML: .5; 3 SOLUTION RESPIRATORY (INHALATION) at 07:04

## 2017-04-24 RX ADMIN — PREDNISONE 40 MG: 20 TABLET ORAL at 09:04

## 2017-04-24 RX ADMIN — TRAZODONE HYDROCHLORIDE 100 MG: 50 TABLET ORAL at 09:04

## 2017-04-24 RX ADMIN — NICOTINE 1 PATCH: 21 PATCH, EXTENDED RELEASE TRANSDERMAL at 08:04

## 2017-04-24 RX ADMIN — DIVALPROEX SODIUM 250 MG: 250 TABLET, DELAYED RELEASE ORAL at 05:04

## 2017-04-24 RX ADMIN — ENOXAPARIN SODIUM 40 MG: 100 INJECTION SUBCUTANEOUS at 05:04

## 2017-04-24 RX ADMIN — IPRATROPIUM BROMIDE AND ALBUTEROL SULFATE 3 ML: .5; 3 SOLUTION RESPIRATORY (INHALATION) at 12:04

## 2017-04-24 RX ADMIN — Medication 10 ML: at 05:04

## 2017-04-24 RX ADMIN — Medication 3 ML: at 09:04

## 2017-04-24 RX ADMIN — FLUTICASONE FUROATE AND VILANTEROL TRIFENATATE 1 PUFF: 100; 25 POWDER RESPIRATORY (INHALATION) at 07:04

## 2017-04-24 RX ADMIN — PIPERACILLIN AND TAZOBACTAM 4.5 G: 4; .5 INJECTION, POWDER, LYOPHILIZED, FOR SOLUTION INTRAVENOUS; PARENTERAL at 09:04

## 2017-04-24 RX ADMIN — OXYCODONE HYDROCHLORIDE AND ACETAMINOPHEN 1 TABLET: 5; 325 TABLET ORAL at 05:04

## 2017-04-24 RX ADMIN — OLANZAPINE 2.5 MG: 10 INJECTION, POWDER, LYOPHILIZED, FOR SOLUTION INTRAMUSCULAR at 08:04

## 2017-04-24 RX ADMIN — PROMETHAZINE HYDROCHLORIDE AND CODEINE PHOSPHATE 5 ML: 6.25; 1 SYRUP ORAL at 08:04

## 2017-04-24 RX ADMIN — DULOXETINE 30 MG: 30 CAPSULE, DELAYED RELEASE ORAL at 08:04

## 2017-04-24 RX ADMIN — METHYLPREDNISOLONE SODIUM SUCCINATE 80 MG: 125 INJECTION, POWDER, FOR SOLUTION INTRAMUSCULAR; INTRAVENOUS at 05:04

## 2017-04-24 NOTE — PROGRESS NOTES
Progress Note  Pulmonology    Admit Date: 4/18/2017   LOS: 5 days     SUBJECTIVE:     Follow-up For: Bilateral pneumonia. Feels much better. Mild cough - has improved quite a bit. Feels nose may be bleeding a bit.    Continuous Infusions:   Scheduled Meds:   albuterol-ipratropium 2.5mg-0.5mg/3mL  3 mL Nebulization Q4H    divalproex  250 mg Oral Q8H    duloxetine  30 mg Oral Daily    enoxaparin  40 mg Subcutaneous Daily    fluticasone-vilanterol  1 puff Inhalation Daily    methylPREDNISolone sodium succinate  80 mg Intravenous Q8H    moxifloxacin  400 mg Intravenous Q24H    nicotine  1 patch Transdermal Daily    pantoprazole  40 mg Intravenous Daily    piperacillin-tazobactam 4.5 g in sodium chloride 0.9% 100 mL IVPB (ready to mix system)  4.5 g Intravenous Q8H    senna-docusate 8.6-50 mg  1 tablet Oral BID    sodium chloride 0.9%  3 mL Intravenous Q8H    trazodone  100 mg Oral QHS     Review of Systems:  Constitutional: no fever or chills but cold  Respiratory: positive for cough and dyspnea on exertion. No wheezing.  Cardiovascular: no chest pain or palpitations or edema  Gi: some nausea but no vomiting or pain    OBJECTIVE:     Vital Signs Range (Last 24H):  Temp:  [97.7 °F (36.5 °C)-98.7 °F (37.1 °C)]   Pulse:  []   Resp:  [0-75]   BP: (101-164)/(50-81)   SpO2:  [91 %-100 %]     I & O (Last 24H):    Intake/Output Summary (Last 24 hours) at 04/24/17 0901  Last data filed at 04/24/17 0459   Gross per 24 hour   Intake              550 ml   Output             1300 ml   Net             -750 ml     Physical Exam:  General: no distress. Sitting up, alert.  Neck: no adenopathy or bruits. No jvd.  Lungs:  normal respiratory effort, decreased bs bilaterally with rhonchi and end inspiratory wheezes.  Heart: regular rate and rhythm and no murmur  Abdomen: soft, non-tender non-distended; bowel sounds normal  Extremities: no cyanosis or edema, or clubbing  Neurologic: alert, oriented, thought content  appropriate    Laboratory:  CBC:     Recent Labs  Lab 04/24/17  0135   WBC 9.28   RBC 3.51*   HGB 10.1*   HCT 32.3*      MCV 92   MCH 28.8   MCHC 31.3*     CMP:     Recent Labs  Lab 04/24/17  0135   *   CALCIUM 8.3*   ALBUMIN 2.4*   PROT 5.2*      K 4.4   CO2 37*   CL 99   BUN 16   CREATININE 0.7   ALKPHOS 97   ALT 24   AST 14   BILITOT 0.3     Chest X-Ray: I personally reviewed the films and findings are: No new CXR     ASSESSMENT/PLAN:     Active Hospital Problems    Diagnosis  POA    *Acute respiratory failure with hypoxia and hypercapnia [J96.01, J96.02]  Yes    Opioid use disorder, severe, dependence [F11.20]  Yes    Substance withdrawal [F19.239]  Yes    Bereavement reaction [F43.20, Z63.4]  Not Applicable    Pneumonia of both lungs due to infectious organism [J18.9]  Yes    Moderate persistent asthma [J45.40]  Yes     Chronic    Anemia of other chronic disease [D63.8]  Yes    Tobacco abuse [Z72.0]  Yes     Chronic    Bipolar 1 disorder [F31.9]  Yes     Chronic    Asthma exacerbation [J45.901]  Yes    Sleep disturbance [G47.9]  Yes      Resolved Hospital Problems    Diagnosis Date Resolved POA   No resolved problems to display.       1) Hypoxia with Bilateral infiltrates - poss severe pneumonia. Reviewing her records - she had a similar episode in Dec. Would be unusual to two recent severe pneumonias - need to consider other processes. Suspect aspiration may be factor. Needs to decrease opioid use. Exam as above. Feeling much better. Decrease steroids and taper. Check repeat cxr in am. Afebrile with negative balance. IS to bedside.  2) Bipolar   3) Tob - encourage smoking cessation   4) Proph   5) Anemia - stable.    Arianna Raymundo

## 2017-04-24 NOTE — PROGRESS NOTES
[facility] Hospital Medicine  Progress Note    Patient Name: Alina Menchaca  MRN: [id number]  Patient Class: IP- Inpatient   Admission Date: 4/18/2017  Length of Stay: 5 days  Attending Physician: Arnle Adames MD  Primary Care Provider: Meghan Hanna MD        Subjective:     Principal Problem:Acute respiratory failure with hypoxia and hypercapnia    HPI:  Ms. Menchaca is a 47 yo woman with asthma, h/o seizures, anxiety and bipolar disorder who presented for cough and shortness of breath. Four days ago she started having shortness of breath and cough. Her coughing was so severe that she has not been able to sleep. She presented to the ER when her son noted her lips to be blue and checked her pulse ox which read in the 70s. She also reports fevers, wheezing, and pleuritic chest pain. She did drive to [place] 4/12/2017 and returned the day of presentation. Her mother passed away the day prior to presentation. In December, she was admitted for pneumonia and had to be intubated. She had since been doing well and does not require supplemental oxygen at home.    Hospital Course:  She was admitted for community-acquired pneumonia/asthma exacerbation and placed on moxifloxacin, methylprednisolone, and neb treatments. She was initially on BiPAP upon EMS arrival but was able to be weaned to nasal cannula in the ER without much difficulty. The night following admission her respiratory status worsened. She was noted to have increased infiltrates on CXR and an ABG of 7.3 pCO2 57 and pO2 of 64 on 100% non-rebreather. She was placed on rescue BiPAP and transferred to the ICU. Of note, she usually uses CPAP at home while sleeping but did not use one that night. She is also extremely anxious because of her mother's recent death. She improved with ativan and BiPAP. She was also given lasix 20mg IV due to the increased infiltrates on CXR.  4/21: Nocturnist called to evaluate patient last night because of  increased dyspnea.  Patient was stating that she needed to be intubated.  She refused ABG's.  Currently in ICU on high flow NC.  Steadily improving and able to wean down oxygen.  Remains on high flow NC at 7L.  She has been continued on nebs, oxygen, IV ABx's and IV steroids.      Interval History: Feeling much better.      Review of Systems   HENT: Negative for ear discharge and ear pain.    Eyes: Negative for pain and redness.   Endocrine: Negative for polyphagia and polyuria.   Genitourinary: Negative for difficulty urinating and dysuria.     Objective:     Vital Signs (Most Recent):  Temp: 97.7 °F (36.5 °C) (04/24/17 0715)  Pulse: 89 (04/24/17 0905)  Resp: (!) 23 (04/24/17 0905)  BP: (!) 164/119 (04/24/17 0902)  SpO2: 100 % (04/24/17 0905) Vital Signs (24h Range):  Temp:  [97.7 °F (36.5 °C)-98.7 °F (37.1 °C)] 97.7 °F (36.5 °C)  Pulse:  [] 89  Resp:  [0-75] 23  SpO2:  [91 %-100 %] 100 %  BP: (101-164)/() 164/119     Weight: 73.2 kg (161 lb 6 oz)  Body mass index is 31.52 kg/(m^2).    Intake/Output Summary (Last 24 hours) at 04/24/17 0948  Last data filed at 04/24/17 0459   Gross per 24 hour   Intake              550 ml   Output             1300 ml   Net             -750 ml      Physical Exam   Constitutional: She is oriented to person, place, and time. She appears well-developed and well-nourished. No distress.   HENT:   Right Ear: External ear normal.   Left Ear: External ear normal.   Nose: Nose normal.   Eyes: EOM are normal. Pupils are equal, round, and reactive to light. No scleral icterus.   Neck: Normal range of motion. Neck supple. No thyromegaly present.   Cardiovascular: Normal rate and normal heart sounds.  Exam reveals no friction rub.    No murmur heard.  Pulmonary/Chest: Effort normal. No respiratory distress. She has no wheezes.   Abdominal: Soft. Bowel sounds are normal. She exhibits no mass. There is no tenderness.   Musculoskeletal: Normal range of motion. She exhibits no edema or  deformity.   Neurological: She is alert and oriented to person, place, and time. No cranial nerve deficit.   Skin: Skin is warm and dry. No pallor.       Significant Labs:   BMP:     Recent Labs  Lab 04/24/17 0135   *      K 4.4   CL 99   CO2 37*   BUN 16   CREATININE 0.7   CALCIUM 8.3*     CBC:     Recent Labs  Lab 04/23/17  0224 04/24/17 0135   WBC 7.73 9.28   HGB 10.0* 10.1*   HCT 31.4* 32.3*    208       Significant Imaging: I have reviewed and interpreted all pertinent imaging results/findings within the past 24 hours.    Assessment/Plan:      * Acute respiratory failure with hypoxia and hypercapnia  Due to pneumonia, asthma exacerbation.  Appreciate Pulmonary input.  Need to consider other etiologies.  Anxiety also probably contributing to dyspnea.  Cont abx, steroids, and nebs.   Currently on high flow NC, but steadily improving and able to wean down to 7L.    Hopefully continue improvement and wean to low flow NC.    Sleep disturbance  Continue home trazodone QHS.    Tobacco abuse  Counseled on the importance of cessation. Nicotine patch.    Asthma exacerbation  As above    Bipolar 1 disorder  Appreciate Psych input.  Will follow recommended changes to medications.    Anemia of other chronic disease  Stable. Normocytic.    Moderate persistent asthma  Continue current management with steroids and nebulizer treatments.    Pneumonia of both lungs due to infectious organism  Continue moxifloxacin, steroids, and breathing treatments.   Probably bacterial, but unable to determine.  Zosyn added to regimen.    Bereavement reaction  Supportive care. I feel her anxiety and sorrow are attributing to her perceived symptoms however she clearly objective signs of acute respiratory failure.  Improving.    Opioid use disorder, severe, dependence  Possible Suboxone withdrawal.  Supportive care.      Substance withdrawal  As above.      VTE Risk Mitigation         Ordered     enoxaparin injection 40 mg   Daily     Route:  Subcutaneous        04/21/17 7490     Medium Risk of VTE  Once      04/19/17 0248          Arnel Adames MD  Department of Hospital Medicine   [facility]

## 2017-04-24 NOTE — PROGRESS NOTES
Transfer Note    She was admitted for community-acquired pneumonia/asthma exacerbation and placed on moxifloxacin, methylprednisolone, and neb treatments. She was initially on BiPAP upon EMS arrival but was able to be weaned to nasal cannula in the ER without much difficulty. The night following admission her respiratory status worsened. She was noted to have increased infiltrates on CXR and an ABG of 7.3 pCO2 57 and pO2 of 64 on 100% non-rebreather. She was placed on rescue BiPAP and transferred to the ICU. Of note, she usually uses CPAP at home while sleeping but did not use one that night. She is also extremely anxious because of her mother's recent death. She improved with ativan and BiPAP. She was also given lasix 20mg IV due to the increased infiltrates on CXR.  4/21: Nocturnist called to evaluate patient last night because of increased dyspnea. Patient was stating that she needed to be intubated. She refused ABG's. Moved to ICU on high flow NC.  Steadily improving and able to wean down oxygen. Remains on high flow NC at 7L.  She has been continued on nebs, oxygen, IV ABx's and IV steroids.  Psych consulted and adjusted medications.    Continue weaning oxygen.  PT/OT evaluation.  Will probably need oxygen upon discharge.

## 2017-04-24 NOTE — ASSESSMENT & PLAN NOTE
Due to pneumonia, asthma exacerbation.  Appreciate Pulmonary input.  Need to consider other etiologies.  Anxiety also probably contributing to dyspnea.  Cont abx, steroids, and nebs.   Currently on high flow NC, but steadily improving and able to wean down to 7L.    Hopefully continue improvement and wean to low flow NC.

## 2017-04-24 NOTE — PLAN OF CARE
"Problem: Patient Care Overview  Goal: Plan of Care Review  Outcome: Ongoing (interventions implemented as appropriate)  Patient continues in ICU on high flow nasal cannula. VSS, but quick to desat if O2 flow is adjusted downward. Status largely unchanged. Patient slept for most of shift, demanded "PRN medications" upon awakening.       "

## 2017-04-24 NOTE — SUBJECTIVE & OBJECTIVE
Interval History: Feeling much better.      Review of Systems   HENT: Negative for ear discharge and ear pain.    Eyes: Negative for pain and redness.   Endocrine: Negative for polyphagia and polyuria.   Genitourinary: Negative for difficulty urinating and dysuria.     Objective:     Vital Signs (Most Recent):  Temp: 97.7 °F (36.5 °C) (04/24/17 0715)  Pulse: 89 (04/24/17 0905)  Resp: (!) 23 (04/24/17 0905)  BP: (!) 164/119 (04/24/17 0902)  SpO2: 100 % (04/24/17 0905) Vital Signs (24h Range):  Temp:  [97.7 °F (36.5 °C)-98.7 °F (37.1 °C)] 97.7 °F (36.5 °C)  Pulse:  [] 89  Resp:  [0-75] 23  SpO2:  [91 %-100 %] 100 %  BP: (101-164)/() 164/119     Weight: 73.2 kg (161 lb 6 oz)  Body mass index is 31.52 kg/(m^2).    Intake/Output Summary (Last 24 hours) at 04/24/17 0948  Last data filed at 04/24/17 0459   Gross per 24 hour   Intake              550 ml   Output             1300 ml   Net             -750 ml      Physical Exam   Constitutional: She is oriented to person, place, and time. She appears well-developed and well-nourished. No distress.   HENT:   Right Ear: External ear normal.   Left Ear: External ear normal.   Nose: Nose normal.   Eyes: EOM are normal. Pupils are equal, round, and reactive to light. No scleral icterus.   Neck: Normal range of motion. Neck supple. No thyromegaly present.   Cardiovascular: Normal rate and normal heart sounds.  Exam reveals no friction rub.    No murmur heard.  Pulmonary/Chest: Effort normal. No respiratory distress. She has no wheezes.   Abdominal: Soft. Bowel sounds are normal. She exhibits no mass. There is no tenderness.   Musculoskeletal: Normal range of motion. She exhibits no edema or deformity.   Neurological: She is alert and oriented to person, place, and time. No cranial nerve deficit.   Skin: Skin is warm and dry. No pallor.       Significant Labs:   BMP:     Recent Labs  Lab 04/24/17  0135   *      K 4.4   CL 99   CO2 37*   BUN 16   CREATININE  0.7   CALCIUM 8.3*     CBC:     Recent Labs  Lab 04/23/17  0224 04/24/17  0135   WBC 7.73 9.28   HGB 10.0* 10.1*   HCT 31.4* 32.3*    208       Significant Imaging: I have reviewed and interpreted all pertinent imaging results/findings within the past 24 hours.

## 2017-04-24 NOTE — ASSESSMENT & PLAN NOTE
Supportive care. I feel her anxiety and sorrow are attributing to her perceived symptoms however she clearly objective signs of acute respiratory failure.  Improving.

## 2017-04-24 NOTE — PLAN OF CARE
04/24/17 1333   Discharge Assessment   Assessment Type Discharge Planning Reassessment   Confirmed/corrected address and phone number on facesheet? Yes   Assessment information obtained from? Patient;Medical Record   Expected Length of Stay (days) 5   Communicated expected length of stay with patient/caregiver yes   Prior to hospitilization cognitive status: Alert/Oriented   Prior to hospitalization functional status: Independent   Current cognitive status: Alert/Oriented   Current Functional Status: Needs Assistance  (ICU lines)   Arrived From home or self-care   Lives With child(betsy), adult;grandchild(betsy);other (see comments)   Able to Return to Prior Arrangements yes   Is patient able to care for self after discharge? Yes   How many people do you have in your home that can help with your care after discharge? 2   Who are your caregiver(s) and their phone number(s)? Yuliet Grigsby daughter [phone]; friend Alexa Negrete [phone]   Patient's perception of discharge disposition home or selfcare   Readmission Within The Last 30 Days no previous admission in last 30 days   Patient currently being followed by outpatient case management? No   Patient currently receives home health services? No   Does the patient currently use HME? No   Patient currently receives private duty nursing? No   Patient currently receives any other outside agency services? No   Equipment Currently Used at Home none   Do you have any problems affording any of your prescribed medications? No   Is the patient taking medications as prescribed? yes   Do you have any financial concerns preventing you from receiving the healthcare you need? No   Does the patient have transportation to healthcare appointments? Yes   Transportation Available family or friend will provide;Medicaid transportation   On Dialysis? No   Discharge Plan A Home with family;Other  (O2)   Discharge Plan B Home with family   Patient/Family In Agreement With Plan yes   SW  "met with patient in ICU, explained role of SW/CM with treatment team, provided contact information with the "discharge planning begins on admission" checklist handout. Confirmed information in demographics added friend who is paid to help patient and her family at home. SW reviewed the discharge planning folder, explained to leave in room with patient so that team/patient can all written discharge information through out hospital stay. Patient states is familiar and has used the one she has from 2016 admissioin.  SW will follow in ICU and assist as needed.    Needs:   Patient still on high flow nasal canula in ICU. May need home O2 per Dr Adames.  Patient reports will need new PCP. SW explained the [facility] in event unable to arrange new PCP for timely follow up at discharge.   Patient's mother  last week (17) the day prior to patient admission. SW provided flyers for grief support groups at this hospital, others thru [facility] and thru [facility] each on [facility].    "

## 2017-04-25 LAB
ALBUMIN SERPL BCP-MCNC: 2.4 G/DL
ALP SERPL-CCNC: 85 U/L
ALT SERPL W/O P-5'-P-CCNC: 16 U/L
ANION GAP SERPL CALC-SCNC: 7 MMOL/L
ANISOCYTOSIS BLD QL SMEAR: SLIGHT
AST SERPL-CCNC: 12 U/L
BACTERIA BLD CULT: NORMAL
BASO STIPL BLD QL SMEAR: ABNORMAL
BASOPHILS NFR BLD: 0 %
BILIRUB SERPL-MCNC: 0.3 MG/DL
BUN SERPL-MCNC: 17 MG/DL
CALCIUM SERPL-MCNC: 7.9 MG/DL
CHLORIDE SERPL-SCNC: 100 MMOL/L
CO2 SERPL-SCNC: 37 MMOL/L
CREAT SERPL-MCNC: 0.7 MG/DL
DIFFERENTIAL METHOD: ABNORMAL
EOSINOPHIL NFR BLD: 0 %
ERYTHROCYTE [DISTWIDTH] IN BLOOD BY AUTOMATED COUNT: 13.2 %
EST. GFR  (AFRICAN AMERICAN): >60 ML/MIN/1.73 M^2
EST. GFR  (NON AFRICAN AMERICAN): >60 ML/MIN/1.73 M^2
GLUCOSE SERPL-MCNC: 162 MG/DL
HCT VFR BLD AUTO: 33.2 %
HGB BLD-MCNC: 10.4 G/DL
HYPOCHROMIA BLD QL SMEAR: ABNORMAL
LYMPHOCYTES NFR BLD: 9 %
MCH RBC QN AUTO: 29.1 PG
MCHC RBC AUTO-ENTMCNC: 31.3 %
MCV RBC AUTO: 93 FL
METAMYELOCYTES NFR BLD MANUAL: 1 %
MONOCYTES NFR BLD: 1 %
NEUTROPHILS NFR BLD: 85 %
NEUTS BAND NFR BLD MANUAL: 3 %
PLATELET # BLD AUTO: 217 K/UL
PMV BLD AUTO: 9.1 FL
POIKILOCYTOSIS BLD QL SMEAR: SLIGHT
POLYCHROMASIA BLD QL SMEAR: ABNORMAL
POTASSIUM SERPL-SCNC: 4.1 MMOL/L
PROMYELOCYTES NFR BLD MANUAL: 1 %
PROT SERPL-MCNC: 4.8 G/DL
RBC # BLD AUTO: 3.57 M/UL
SODIUM SERPL-SCNC: 144 MMOL/L
WBC # BLD AUTO: 8.92 K/UL

## 2017-04-25 PROCEDURE — 25000003 PHARM REV CODE 250: Performed by: HOSPITALIST

## 2017-04-25 PROCEDURE — 94640 AIRWAY INHALATION TREATMENT: CPT

## 2017-04-25 PROCEDURE — 25000003 PHARM REV CODE 250: Performed by: INTERNAL MEDICINE

## 2017-04-25 PROCEDURE — 85007 BL SMEAR W/DIFF WBC COUNT: CPT

## 2017-04-25 PROCEDURE — 25000003 PHARM REV CODE 250: Performed by: EMERGENCY MEDICINE

## 2017-04-25 PROCEDURE — 80053 COMPREHEN METABOLIC PANEL: CPT

## 2017-04-25 PROCEDURE — 85027 COMPLETE CBC AUTOMATED: CPT

## 2017-04-25 PROCEDURE — 97161 PT EVAL LOW COMPLEX 20 MIN: CPT

## 2017-04-25 PROCEDURE — 63600175 PHARM REV CODE 636 W HCPCS: Performed by: INTERNAL MEDICINE

## 2017-04-25 PROCEDURE — 99900035 HC TECH TIME PER 15 MIN (STAT)

## 2017-04-25 PROCEDURE — 63600175 PHARM REV CODE 636 W HCPCS: Performed by: HOSPITALIST

## 2017-04-25 PROCEDURE — G8987 SELF CARE CURRENT STATUS: HCPCS | Mod: CI

## 2017-04-25 PROCEDURE — 12000002 HC ACUTE/MED SURGE SEMI-PRIVATE ROOM

## 2017-04-25 PROCEDURE — 27000221 HC OXYGEN, UP TO 24 HOURS

## 2017-04-25 PROCEDURE — 36415 COLL VENOUS BLD VENIPUNCTURE: CPT

## 2017-04-25 PROCEDURE — 25000242 PHARM REV CODE 250 ALT 637 W/ HCPCS: Performed by: EMERGENCY MEDICINE

## 2017-04-25 PROCEDURE — 94660 CPAP INITIATION&MGMT: CPT

## 2017-04-25 PROCEDURE — 97165 OT EVAL LOW COMPLEX 30 MIN: CPT

## 2017-04-25 PROCEDURE — G8988 SELF CARE GOAL STATUS: HCPCS | Mod: CI

## 2017-04-25 PROCEDURE — G8989 SELF CARE D/C STATUS: HCPCS | Mod: CI

## 2017-04-25 PROCEDURE — 63600175 PHARM REV CODE 636 W HCPCS: Performed by: EMERGENCY MEDICINE

## 2017-04-25 RX ORDER — FUROSEMIDE 10 MG/ML
80 INJECTION INTRAMUSCULAR; INTRAVENOUS ONCE
Status: COMPLETED | OUTPATIENT
Start: 2017-04-25 | End: 2017-04-25

## 2017-04-25 RX ADMIN — IPRATROPIUM BROMIDE AND ALBUTEROL SULFATE 3 ML: .5; 3 SOLUTION RESPIRATORY (INHALATION) at 08:04

## 2017-04-25 RX ADMIN — OXYCODONE HYDROCHLORIDE AND ACETAMINOPHEN 1 TABLET: 5; 325 TABLET ORAL at 05:04

## 2017-04-25 RX ADMIN — DIVALPROEX SODIUM 250 MG: 250 TABLET, DELAYED RELEASE ORAL at 02:04

## 2017-04-25 RX ADMIN — PIPERACILLIN AND TAZOBACTAM 4.5 G: 4; .5 INJECTION, POWDER, LYOPHILIZED, FOR SOLUTION INTRAVENOUS; PARENTERAL at 01:04

## 2017-04-25 RX ADMIN — IPRATROPIUM BROMIDE AND ALBUTEROL SULFATE 3 ML: .5; 3 SOLUTION RESPIRATORY (INHALATION) at 11:04

## 2017-04-25 RX ADMIN — PIPERACILLIN AND TAZOBACTAM 4.5 G: 4; .5 INJECTION, POWDER, LYOPHILIZED, FOR SOLUTION INTRAVENOUS; PARENTERAL at 05:04

## 2017-04-25 RX ADMIN — IPRATROPIUM BROMIDE AND ALBUTEROL SULFATE 3 ML: .5; 3 SOLUTION RESPIRATORY (INHALATION) at 12:04

## 2017-04-25 RX ADMIN — PANTOPRAZOLE SODIUM 40 MG: 40 TABLET, DELAYED RELEASE ORAL at 10:04

## 2017-04-25 RX ADMIN — IPRATROPIUM BROMIDE AND ALBUTEROL SULFATE 3 ML: .5; 3 SOLUTION RESPIRATORY (INHALATION) at 04:04

## 2017-04-25 RX ADMIN — OXYCODONE HYDROCHLORIDE AND ACETAMINOPHEN 1 TABLET: 5; 325 TABLET ORAL at 02:04

## 2017-04-25 RX ADMIN — DIVALPROEX SODIUM 250 MG: 250 TABLET, DELAYED RELEASE ORAL at 05:04

## 2017-04-25 RX ADMIN — PIPERACILLIN AND TAZOBACTAM 4.5 G: 4; .5 INJECTION, POWDER, LYOPHILIZED, FOR SOLUTION INTRAVENOUS; PARENTERAL at 08:04

## 2017-04-25 RX ADMIN — OLANZAPINE 2.5 MG: 10 INJECTION, POWDER, LYOPHILIZED, FOR SOLUTION INTRAMUSCULAR at 07:04

## 2017-04-25 RX ADMIN — OXYCODONE HYDROCHLORIDE AND ACETAMINOPHEN 1 TABLET: 5; 325 TABLET ORAL at 10:04

## 2017-04-25 RX ADMIN — Medication 3 ML: at 05:04

## 2017-04-25 RX ADMIN — FLUTICASONE FUROATE AND VILANTEROL TRIFENATATE 1 PUFF: 100; 25 POWDER RESPIRATORY (INHALATION) at 08:04

## 2017-04-25 RX ADMIN — MOXIFLOXACIN HYDROCHLORIDE 400 MG: 400 INJECTION, SOLUTION INTRAVENOUS at 12:04

## 2017-04-25 RX ADMIN — PREDNISONE 40 MG: 20 TABLET ORAL at 10:04

## 2017-04-25 RX ADMIN — ENOXAPARIN SODIUM 40 MG: 100 INJECTION SUBCUTANEOUS at 06:04

## 2017-04-25 RX ADMIN — Medication 3 ML: at 02:04

## 2017-04-25 RX ADMIN — DIVALPROEX SODIUM 250 MG: 250 TABLET, DELAYED RELEASE ORAL at 09:04

## 2017-04-25 RX ADMIN — DOCUSATE SODIUM AND SENNOSIDES 1 TABLET: 8.6; 5 TABLET, FILM COATED ORAL at 08:04

## 2017-04-25 RX ADMIN — FUROSEMIDE 80 MG: 10 INJECTION, SOLUTION INTRAMUSCULAR; INTRAVENOUS at 12:04

## 2017-04-25 RX ADMIN — NICOTINE 1 PATCH: 21 PATCH, EXTENDED RELEASE TRANSDERMAL at 10:04

## 2017-04-25 RX ADMIN — TRAZODONE HYDROCHLORIDE 100 MG: 50 TABLET ORAL at 08:04

## 2017-04-25 RX ADMIN — DULOXETINE 30 MG: 30 CAPSULE, DELAYED RELEASE ORAL at 10:04

## 2017-04-25 NOTE — PT/OT/SLP EVAL
"Occupational Therapy  Evaluation/Discharge    Alina Menchaca   MRN: [id number]   Admitting Diagnosis: Acute respiratory failure with hypoxia and hypercapnia    OT Date of Treatment: 04/25/17   OT Start Time: 1135  OT Stop Time: 1143  OT Total Time (min): 8 min    Billable Minutes:  Evaluation 8 minutes    Diagnosis: Acute respiratory failure with hypoxia and hypercapnia       Past Medical History:   Diagnosis Date    Asthma     Bipolar 1 disorder     Manic depression     Seizures       Past Surgical History:   Procedure Laterality Date    APPENDECTOMY      CARPAL TUNNEL RELEASE      HERNIA REPAIR      HYSTERECTOMY         Referring physician: Dr. Wise  Date referred to OT: 4/24/2017    General Precautions: Standard, fall  Orthopedic Precautions: N/A  Braces: N/A    Do you have any cultural, spiritual, Hinduism conflicts, given your current situation?: Zoroastrianism     Patient History:  Living Environment  Lives With: child(betsy), adult, grandchild(betsy), friend(s)  Living Arrangements: house  Transportation Available: family or friend will provide, Medicaid transportation  Equipment Currently Used at Home: none    Prior level of function:   Bed Mobility/Transfers: independent  Grooming: independent  Bathing: independent  Upper Body Dressing: independent  Lower Body Dressing: independent  Toileting: independent        Dominant hand: right    Subjective:  Communicated with nurse Abdalla prior to session.  Stated that patient was asking for percocet for pain.  Chief Complaint: "Oh I can take care of myself, I'm not sure how well I can walk."  Patient/Family stated goals: to go home    Pain Rating: other (see comments) (Patient c/o pain to nurse requesting pain medication but it is not available until  100p)    Objective:  Patient found with: bed alarm, blood pressure cuff, peripheral IV, pulse ox (continuous), telemetry    Cognitive Exam:  Oriented to: Person, Place and Situation  Follows Commands/attention: Follows " one-step commands  Communication: clear/fluent  Memory:  No Deficits noted  Safety awareness/insight to disability: intact  Coping skills/emotional control: Appropriate to situation    Visual/perceptual:  Intact    Physical Exam:  Postural examination/scapula alignment: No postural abnormalities identified  Skin integrity: Visible skin intact  Edema: None noted     Sensation:   Intact    Upper Extremity Range of Motion:  Right Upper Extremity: WFL  Left Upper Extremity: WFL    Upper Extremity Strength:  Right Upper Extremity: WFL  Left Upper Extremity: WFL   Strength: WFL    Fine motor coordination:   Intact    Gross motor coordination: WFL    Functional Mobility:  Bed Mobility:  Rolling/Turning to Left: Modified independent, With side rail  Rolling/Turning Right: Modified independent, With side rail  Scooting/Bridging: Independent  Supine to Sit: Supervision, WIth side rail  Sit to Supine: Supervision, With side rail    Transfers:  Sit <> Stand Assistance: Stand By Assistance  Sit <> Stand Assistive Device: No Assistive Device  Toilet Transfer Technique: Stand Pivot  Toilet Transfer Assistance: Modified Independent  Toilet Transfer Assistive Device: No Assistive Device    Functional Ambulation: able to transfer to [facility] without assistance    Activities of Daily Living:  Feeding Level of Assistance: Independent  UE Dressing Level of Assistance: Modified independent  LE Dressing Level of Assistance: Modified independent  Grooming Position: Seated  Grooming Level of Assistance: Modified independent  Toileting Where Assessed: Bedside commode  Toileting Level of Assistance: Modified independent    Balance:   Static Sit: FAIR+: Able to take MINIMAL challenges from all directions  Dynamic Sit: FAIR+: Maintains balance through MINIMAL excursions of active trunk motion  Static Stand: FAIR+: Takes MINIMAL challenges from all directions  Dynamic stand: NT transfers only      AM-PAC 6 CLICK ADL  How much help from another  "person does this patient currently need?  1 = Unable, Total/Dependent Assistance  2 = A lot, Maximum/Moderate Assistance  3 = A little, Minimum/Contact Guard/Supervision  4 = None, Modified C[place]/Independent    Putting on and taking off regular lower body clothing? : 4  Bathing (including washing, rinsing, drying)?: 3  Toileting, which includes using toilet, bedpan, or urinal? : 4  Putting on and taking off regular upper body clothing?: 4  Taking care of personal grooming such as brushing teeth?: 4  Eating meals?: 4  Total Score: 23    AM-PAC Raw Score CMS "G-Code Modifier Level of Impairment Assistance   6 % Total / Unable   7 - 9 CM 80 - 100% Maximal Assist   10 - 14 CL 60 - 80% Moderate Assist   15 - 19 CK 40 - 60% Moderate Assist   20 - 22 CJ 20 - 40% Minimal Assist   23 CI 1-20% SBA / CGA   24 CH 0% Independent/ Mod I       Patient left supine with all lines intact, call button in reach and nurse Rm notified    Assessment:  Alina Menchaca is a 46 y.o. female with a medical diagnosis of Acute respiratory failure with hypoxia and hypercapnia and presents with functional status at her PLOF per patient.    Rehab identified problem list/impairments: Rehab identified problem list/impairments: impaired endurance, pain, decreased safety awareness    Rehab potential is good.    Activity tolerance: Good    Discharge recommendations: Discharge Facility/Level Of Care Needs: home     Barriers to discharge: Barriers to Discharge: None    Equipment recommendations: none     GOALS:   Occupational Therapy Goals     Not on file      Multidisciplinary Problems (Resolved)        Problem: Occupational Therapy Goal    Goal Priority Disciplines Outcome Interventions   Occupational Therapy Goal   (Resolved)     OT, PT/OT Outcome(s) achieved              PLAN:  Patient to be seen   to address the above listed problems via    Plan of Care expires:    Plan of Care reviewed with: patient    OT G-codes  Functional Assessment " Tool Used: [facility]  Score: 23  Functional Limitation: Self care  Self Care Current Status (): CI  Self Care Goal Status (): CI  Self Care Discharge Status (): PASQUALE Enriquez, MS  04/25/2017

## 2017-04-25 NOTE — PT/OT/SLP EVAL
Physical Therapy  Evaluation / Discharge    Alina Menchaca   MRN: [id number]   Admitting Diagnosis: Acute respiratory failure with hypoxia and hypercapnia    PT Received On: 17  PT Start Time: 1546     PT Stop Time: 1557    PT Total Time (min): 11 min       Billable Minutes:  Evaluation  11    Diagnosis: Acute respiratory failure with hypoxia and hypercapnia    Past Medical History:   Diagnosis Date    Asthma     Bipolar 1 disorder     Manic depression     Seizures       Past Surgical History:   Procedure Laterality Date    APPENDECTOMY      CARPAL TUNNEL RELEASE      HERNIA REPAIR      HYSTERECTOMY       Referring physician: Zacarias  Date referred to PT: 17    General Precautions: Standard, fall, respiratory  Orthopedic Precautions: N/A   Braces: N/A       Patient History:  Lives With: child(betsy), adult, grandchild(betsy)  Living Arrangements: house  Home Accessibility:  (no concerns)  Equipment Currently Used at Home: none    Previous Level of Function:  Ambulation Skills: independent  Transfer Skills: independent    Subjective:  Communicated with nurse Abdalla prior to session.  Patient agreeable to participate in PT evaluation.   Chief Complaint: Ready to go home.   Patient goals: To go home.     Pain Ratin/10     Objective:   Patient found with: telemetry, blood pressure cuff, oxygen, pulse ox (continuous), peripheral IV (nurse stated okay to remove lines prior to evaluation and ambulate on RA)     Cognitive Exam:  Oriented to: Person, Place and Situation    Follows Commands/attention: Follows one-step commands  Communication: clear/fluent  Safety awareness/insight to disability: impaired    Physical Exam:  Postural examination/scapula alignment: No postural abnormalities identified    Skin integrity: Visible skin intact  Edema: None noted     Sensation: Intact    Lower Extremity Range of Motion:  Right Lower Extremity: WFL  Left Lower Extremity: WFL    Lower Extremity Strength:  Right Lower  Extremity: WFL  Left Lower Extremity: WFL     Fine motor coordination:  Intact    Gross motor coordination: WFL    Functional Mobility:  Bed Mobility:  Sit to Supine: Supervision    Transfers:  Sit <> Stand Assistance: Supervision  Sit <> Stand Assistive Device: No Assistive Device    Gait:   Gait Distance: ~220ft with O2 sats 78% on RA. Patient placed back on O2 and sats jumped up to 93%. Nurse and MD notified.   Assistance 1: Supervision  Gait Assistive Device: No device    Balance:   Static Sit: NORMAL: No deviations seen in posture held statically  Dynamic Sit: GOOD+: Maintains balance through MAXIMAL excursions of active trunk motion  Static Stand: GOOD: Takes MODERATE challenges from all directions  Dynamic stand: FAIR+: Needs CLOSE SUPERVISION during gait and is able to right self with minor LOB    AM-PAC 6 CLICK MOBILITY  How much help from another person does this patient currently need?   1 = Unable, Total/Dependent Assistance  2 = A lot, Maximum/Moderate Assistance  3 = A little, Minimum/Contact Guard/Supervision  4 = None, Modified [place]/Independent    Turning over in bed (including adjusting bedclothes, sheets and blankets)?: 4  Sitting down on and standing up from a chair with arms (e.g., wheelchair, bedside commode, etc.): 4  Moving from lying on back to sitting on the side of the bed?: 4  Moving to and from a bed to a chair (including a wheelchair)?: 4  Need to walk in hospital room?: 4  Climbing 3-5 steps with a railing?: 4  Total Score: 24     AM-PAC Raw Score CMS G-Code Modifier Level of Impairment Assistance   6 % Total / Unable   7 - 9 CM 80 - 100% Maximal Assist   10 - 14 CL 60 - 80% Moderate Assist   15 - 19 CK 40 - 60% Moderate Assist   20 - 22 CJ 20 - 40% Minimal Assist   23 CI 1-20% SBA / CGA   24 CH 0% Independent/ Mod I     Patient left supine with all lines intact, call button in reach, nurse notified, and nurse present.    Assessment:   Alina Menchaca is a 46 y.o. female  with a medical diagnosis of Acute respiratory failure with hypoxia and hypercapnia and presents with impaired endurance for functional mobility. She is okay to ambulate in hallway with nursing staff supervision and oxygen due to desaturation on RA with ambulation today. She has no needs for PT while in the hospital. PT to sign off.     Rehab identified problem list/impairments: Rehab identified problem list/impairments: impaired endurance, impaired cardiopulmonary response to activity, decreased safety awareness    Rehab potential is good.    Activity tolerance: Fair due to drop in O2 sats on RA    Discharge recommendations: Discharge Facility/Level Of Care Needs: home     Barriers to discharge: Barriers to Discharge: None    Equipment recommendations: Equipment Needed After Discharge: none     GOALS:   Physical Therapy Goals     Not on file      Multidisciplinary Problems (Resolved)        Problem: Physical Therapy Goal    Goal Priority Disciplines Outcome Goal Variances Interventions   Physical Therapy Goal   (Resolved)     PT/OT, PT Outcome(s) achieved             PLAN:    Plan of Care reviewed with: patient (nurse Rm )    Tawny Frausto, PT, MOT  04/25/2017

## 2017-04-25 NOTE — PLAN OF CARE
Problem: Occupational Therapy Goal  Goal: Occupational Therapy Goal  Outcome: Outcome(s) achieved Date Met:  04/25/17  Patient tolerated evaluation well, patient with no need for skilled OT services at this time as patient observed transferring to BSC, toileting, and performing patti care independenly. PASQUALE Amanda, MS

## 2017-04-25 NOTE — PLAN OF CARE
Problem: Patient Care Overview  Goal: Plan of Care Review  Outcome: Ongoing (interventions implemented as appropriate)  Patient remains in ICU overnight, awaiting transfer to med/surg floor. Patient remains on 5L high flow nasal cannula, oxygen saturations decreased during quick ambulation to sink. Slept most of shift, demands PRN medication upon awakening. Promethazine-codeine given once with full relief. No falls, injuries, or skin breakdown throughout shift.

## 2017-04-25 NOTE — PROGRESS NOTES
Progress Note  Pulmonology    Admit Date: 4/18/2017   LOS: 6 days     SUBJECTIVE: dyspnea     Follow-up For: Bilateral pneumonia. Feels much better. Mild cough - has improved quite a bit. Feels nose may be bleeding a bit.    Continuous Infusions:   Scheduled Meds:   albuterol-ipratropium 2.5mg-0.5mg/3mL  3 mL Nebulization Q4H    divalproex  250 mg Oral Q8H    duloxetine  30 mg Oral Daily    enoxaparin  40 mg Subcutaneous Daily    fluticasone-vilanterol  1 puff Inhalation Daily    furosemide  80 mg Intravenous Once    moxifloxacin  400 mg Intravenous Q24H    nicotine  1 patch Transdermal Daily    pantoprazole  40 mg Oral Daily    piperacillin-tazobactam 4.5 g in sodium chloride 0.9% 100 mL IVPB (ready to mix system)  4.5 g Intravenous Q8H    predniSONE  40 mg Oral Daily    senna-docusate 8.6-50 mg  1 tablet Oral BID    sodium chloride 0.9%  3 mL Intravenous Q8H    trazodone  100 mg Oral QHS     Review of Systems:  Constitutional: no fever or chills but cold  Respiratory: positive for cough and dyspnea on exertion. No wheezing.  Cardiovascular: no chest pain or palpitations or edema  Gi: some nausea but no vomiting or pain    OBJECTIVE:     Vital Signs Range (Last 24H):  Temp:  [97.9 °F (36.6 °C)-98.3 °F (36.8 °C)]   Pulse:  []   Resp:  [20-64]   BP: (114-135)/(57-70)   SpO2:  [90 %-100 %]     I & O (Last 24H):    Intake/Output Summary (Last 24 hours) at 04/25/17 0950  Last data filed at 04/25/17 0515   Gross per 24 hour   Intake              550 ml   Output                0 ml   Net              550 ml     Physical Exam:  General: no distress. Sitting up, alert.  Neck: no adenopathy or bruits. No jvd.  Lungs:  normal respiratory effort, decreased bs bilaterally with rhonchi and end inspiratory wheezes.  Heart: regular rate and rhythm and no murmur  Abdomen: soft, non-tender non-distended; bowel sounds normal  Extremities: no cyanosis or edema, or clubbing  Neurologic: alert, oriented, thought  content appropriate    Laboratory:  CBC:     Recent Labs  Lab 04/25/17  0233   WBC 8.92   RBC 3.57*   HGB 10.4*   HCT 33.2*      MCV 93   MCH 29.1   MCHC 31.3*     CMP:     Recent Labs  Lab 04/25/17  0233   *   CALCIUM 7.9*   ALBUMIN 2.4*   PROT 4.8*      K 4.1   CO2 37*      BUN 17   CREATININE 0.7   ALKPHOS 85   ALT 16   AST 12   BILITOT 0.3     Chest X-Ray:Findings:   Cardiac monitoring leads overlie the chest. Cardiomediastinal silhouette appears within normal limits and stable from prior examination. There are patchy bilateral interstitial opacities again noted, left greater than right, suggestive of edema or ARDS. There may be mild improved aeration of the right lung base compared to the prior examination. No new focal air space consolidation is identified. There is no pneumothorax.    ASSESSMENT/PLAN:     Active Hospital Problems    Diagnosis  POA    *Acute respiratory failure with hypoxia and hypercapnia [J96.01, J96.02]  Yes    Opioid use disorder, severe, dependence [F11.20]  Yes    Substance withdrawal [F19.239]  Yes    Bereavement reaction [F43.20, Z63.4]  Not Applicable    Pneumonia of both lungs due to infectious organism [J18.9]  Yes    Moderate persistent asthma [J45.40]  Yes     Chronic    Anemia of other chronic disease [D63.8]  Yes    Tobacco abuse [Z72.0]  Yes     Chronic    Bipolar 1 disorder [F31.9]  Yes     Chronic    Asthma exacerbation [J45.901]  Yes    Sleep disturbance [G47.9]  Yes      Resolved Hospital Problems    Diagnosis Date Resolved POA   No resolved problems to display.       1) Hypoxia with Bilateral infiltrates - poss severe pneumonia. Reviewing her records - she had a similar episode in Dec. Would be unusual to two recent severe pneumonias - need to consider other processes. Suspect aspiration may be factor. Needs to decrease opioid use. Exam as above. Feeling much better. Decrease steroids and taper. Check repeat cxr in am. Afebrile with  negative balance. IS to bedside.  2) Bipolar   3) Tob - encourage smoking cessation   4) Proph   5) Anemia - stable.    Feels much better ; cxr suggest some congestion ; will give dose of lasix ; for transfer to floor ; will follow    Madan Morillo

## 2017-04-25 NOTE — PLAN OF CARE
Problem: Patient Care Overview  Goal: Plan of Care Review  Outcome: Ongoing (interventions implemented as appropriate)  Patient remains on high oxygen per nasal canula. She appears in no respiratory distress. Able to ambulate short distance to bedside commode without difficulty. Prn pain medication given X1. She sustained no injuries, falls or trauma this shift.

## 2017-04-25 NOTE — PLAN OF CARE
Problem: Patient Care Overview  Goal: Plan of Care Review  Outcome: Ongoing (interventions implemented as appropriate)  Remains on nasal canula without distress. Intermittent cough causing back pain. Prn pain medication given X1. Ambulated in hallway with PT without oxygen. RA sats were 80-81%. Furosemide 80 mg given intravenously with good results. Diuresed well. No injuries, falls or trauma this shift.

## 2017-04-25 NOTE — PLAN OF CARE
Problem: Physical Therapy Goal  Goal: Physical Therapy Goal  Outcome: Outcome(s) achieved Date Met:  04/25/17     Patient okay to ambulate with nursing staff supervision. Patient's O2 sats 78% on RA with ambulation. Nurse and MD notified.

## 2017-04-26 LAB
ALBUMIN SERPL BCP-MCNC: 2.7 G/DL
ALP SERPL-CCNC: 83 U/L
ALT SERPL W/O P-5'-P-CCNC: 17 U/L
ANION GAP SERPL CALC-SCNC: 8 MMOL/L
ANISOCYTOSIS BLD QL SMEAR: SLIGHT
AST SERPL-CCNC: 14 U/L
BASOPHILS NFR BLD: 0 %
BILIRUB SERPL-MCNC: 0.4 MG/DL
BUN SERPL-MCNC: 20 MG/DL
CALCIUM SERPL-MCNC: 8.4 MG/DL
CHLORIDE SERPL-SCNC: 97 MMOL/L
CO2 SERPL-SCNC: 37 MMOL/L
CREAT SERPL-MCNC: 0.7 MG/DL
DIFFERENTIAL METHOD: ABNORMAL
EOSINOPHIL NFR BLD: 0 %
ERYTHROCYTE [DISTWIDTH] IN BLOOD BY AUTOMATED COUNT: 13.4 %
EST. GFR  (AFRICAN AMERICAN): >60 ML/MIN/1.73 M^2
EST. GFR  (NON AFRICAN AMERICAN): >60 ML/MIN/1.73 M^2
GLUCOSE SERPL-MCNC: 155 MG/DL
HCT VFR BLD AUTO: 37.3 %
HGB BLD-MCNC: 11.9 G/DL
HYPOCHROMIA BLD QL SMEAR: ABNORMAL
LYMPHOCYTES NFR BLD: 20 %
MCH RBC QN AUTO: 29.2 PG
MCHC RBC AUTO-ENTMCNC: 31.9 %
MCV RBC AUTO: 91 FL
METAMYELOCYTES NFR BLD MANUAL: 3 %
MONOCYTES NFR BLD: 3 %
MYELOCYTES NFR BLD MANUAL: 3 %
NEUTROPHILS NFR BLD: 67 %
NEUTS BAND NFR BLD MANUAL: 4 %
OVALOCYTES BLD QL SMEAR: ABNORMAL
PLATELET # BLD AUTO: 239 K/UL
PMV BLD AUTO: 9.6 FL
POIKILOCYTOSIS BLD QL SMEAR: SLIGHT
POLYCHROMASIA BLD QL SMEAR: ABNORMAL
POTASSIUM SERPL-SCNC: 4 MMOL/L
PROT SERPL-MCNC: 5.4 G/DL
RBC # BLD AUTO: 4.08 M/UL
SODIUM SERPL-SCNC: 142 MMOL/L
WBC # BLD AUTO: 9.25 K/UL

## 2017-04-26 PROCEDURE — 85027 COMPLETE CBC AUTOMATED: CPT

## 2017-04-26 PROCEDURE — 25000003 PHARM REV CODE 250: Performed by: EMERGENCY MEDICINE

## 2017-04-26 PROCEDURE — 99900035 HC TECH TIME PER 15 MIN (STAT)

## 2017-04-26 PROCEDURE — 25000003 PHARM REV CODE 250: Performed by: INTERNAL MEDICINE

## 2017-04-26 PROCEDURE — 94640 AIRWAY INHALATION TREATMENT: CPT

## 2017-04-26 PROCEDURE — 80053 COMPREHEN METABOLIC PANEL: CPT

## 2017-04-26 PROCEDURE — 12000002 HC ACUTE/MED SURGE SEMI-PRIVATE ROOM

## 2017-04-26 PROCEDURE — 27000221 HC OXYGEN, UP TO 24 HOURS

## 2017-04-26 PROCEDURE — 63600175 PHARM REV CODE 636 W HCPCS: Performed by: INTERNAL MEDICINE

## 2017-04-26 PROCEDURE — 25000003 PHARM REV CODE 250: Performed by: HOSPITALIST

## 2017-04-26 PROCEDURE — 94761 N-INVAS EAR/PLS OXIMETRY MLT: CPT

## 2017-04-26 PROCEDURE — 25000242 PHARM REV CODE 250 ALT 637 W/ HCPCS: Performed by: EMERGENCY MEDICINE

## 2017-04-26 PROCEDURE — 36415 COLL VENOUS BLD VENIPUNCTURE: CPT

## 2017-04-26 PROCEDURE — 85007 BL SMEAR W/DIFF WBC COUNT: CPT

## 2017-04-26 RX ORDER — PREDNISONE 20 MG/1
20 TABLET ORAL DAILY
Status: DISCONTINUED | OUTPATIENT
Start: 2017-04-27 | End: 2017-04-27 | Stop reason: HOSPADM

## 2017-04-26 RX ORDER — METHOCARBAMOL 500 MG/1
500 TABLET, FILM COATED ORAL 4 TIMES DAILY PRN
Status: DISCONTINUED | OUTPATIENT
Start: 2017-04-26 | End: 2017-04-27 | Stop reason: HOSPADM

## 2017-04-26 RX ADMIN — PIPERACILLIN AND TAZOBACTAM 4.5 G: 4; .5 INJECTION, POWDER, LYOPHILIZED, FOR SOLUTION INTRAVENOUS; PARENTERAL at 04:04

## 2017-04-26 RX ADMIN — PREDNISONE 40 MG: 20 TABLET ORAL at 08:04

## 2017-04-26 RX ADMIN — FLUTICASONE FUROATE AND VILANTEROL TRIFENATATE 1 PUFF: 100; 25 POWDER RESPIRATORY (INHALATION) at 01:04

## 2017-04-26 RX ADMIN — IPRATROPIUM BROMIDE AND ALBUTEROL SULFATE 3 ML: .5; 3 SOLUTION RESPIRATORY (INHALATION) at 07:04

## 2017-04-26 RX ADMIN — IPRATROPIUM BROMIDE AND ALBUTEROL SULFATE 3 ML: .5; 3 SOLUTION RESPIRATORY (INHALATION) at 11:04

## 2017-04-26 RX ADMIN — Medication 3 ML: at 09:04

## 2017-04-26 RX ADMIN — DIVALPROEX SODIUM 250 MG: 250 TABLET, DELAYED RELEASE ORAL at 06:04

## 2017-04-26 RX ADMIN — DULOXETINE 30 MG: 30 CAPSULE, DELAYED RELEASE ORAL at 08:04

## 2017-04-26 RX ADMIN — PIPERACILLIN AND TAZOBACTAM 4.5 G: 4; .5 INJECTION, POWDER, LYOPHILIZED, FOR SOLUTION INTRAVENOUS; PARENTERAL at 12:04

## 2017-04-26 RX ADMIN — DIVALPROEX SODIUM 250 MG: 250 TABLET, DELAYED RELEASE ORAL at 01:04

## 2017-04-26 RX ADMIN — PANTOPRAZOLE SODIUM 40 MG: 40 TABLET, DELAYED RELEASE ORAL at 08:04

## 2017-04-26 RX ADMIN — NICOTINE 1 PATCH: 21 PATCH, EXTENDED RELEASE TRANSDERMAL at 08:04

## 2017-04-26 RX ADMIN — DIVALPROEX SODIUM 250 MG: 250 TABLET, DELAYED RELEASE ORAL at 09:04

## 2017-04-26 RX ADMIN — METHOCARBAMOL 500 MG: 500 TABLET ORAL at 09:04

## 2017-04-26 RX ADMIN — OXYCODONE HYDROCHLORIDE AND ACETAMINOPHEN 1 TABLET: 5; 325 TABLET ORAL at 08:04

## 2017-04-26 RX ADMIN — Medication 3 ML: at 01:04

## 2017-04-26 RX ADMIN — DOCUSATE SODIUM AND SENNOSIDES 1 TABLET: 8.6; 5 TABLET, FILM COATED ORAL at 09:04

## 2017-04-26 RX ADMIN — TRAZODONE HYDROCHLORIDE 100 MG: 50 TABLET ORAL at 09:04

## 2017-04-26 RX ADMIN — DOCUSATE SODIUM AND SENNOSIDES 1 TABLET: 8.6; 5 TABLET, FILM COATED ORAL at 08:04

## 2017-04-26 RX ADMIN — ACETAMINOPHEN 650 MG: 325 TABLET, FILM COATED ORAL at 06:04

## 2017-04-26 RX ADMIN — PIPERACILLIN AND TAZOBACTAM 4.5 G: 4; .5 INJECTION, POWDER, LYOPHILIZED, FOR SOLUTION INTRAVENOUS; PARENTERAL at 08:04

## 2017-04-26 RX ADMIN — METHOCARBAMOL 500 MG: 500 TABLET ORAL at 12:04

## 2017-04-26 RX ADMIN — IPRATROPIUM BROMIDE AND ALBUTEROL SULFATE 3 ML: .5; 3 SOLUTION RESPIRATORY (INHALATION) at 03:04

## 2017-04-26 RX ADMIN — OXYCODONE HYDROCHLORIDE AND ACETAMINOPHEN 1 TABLET: 5; 325 TABLET ORAL at 05:04

## 2017-04-26 RX ADMIN — IPRATROPIUM BROMIDE AND ALBUTEROL SULFATE 3 ML: .5; 3 SOLUTION RESPIRATORY (INHALATION) at 04:04

## 2017-04-26 NOTE — SUBJECTIVE & OBJECTIVE
Review of Systems   Constitutional: Negative for chills and fever.   HENT: Negative for ear discharge and ear pain.    Eyes: Negative for pain and redness.   Respiratory: Negative for cough and wheezing.    Cardiovascular: Negative for chest pain and leg swelling.   Gastrointestinal: Negative for abdominal pain and constipation.   Endocrine: Negative for polyphagia and polyuria.   Genitourinary: Negative for difficulty urinating and dysuria.   Hematological: Negative for adenopathy. Bruises/bleeds easily.   Psychiatric/Behavioral: Negative for confusion. The patient is nervous/anxious.      Objective:     Vital Signs (Most Recent):  Temp: 98.5 °F (36.9 °C) (04/26/17 1305)  Pulse: 105 (04/26/17 1314)  Resp: 18 (04/26/17 1314)  BP: 124/76 (04/26/17 1305)  SpO2: 99 % (04/26/17 1314) Vital Signs (24h Range):  Temp:  [97.7 °F (36.5 °C)-99 °F (37.2 °C)] 98.5 °F (36.9 °C)  Pulse:  [] 105  Resp:  [16-40] 18  SpO2:  [90 %-99 %] 99 %  BP: (115-130)/(59-76) 124/76     Weight: 73.2 kg (161 lb 6 oz)  Body mass index is 31.52 kg/(m^2).    Intake/Output Summary (Last 24 hours) at 04/26/17 1429  Last data filed at 04/26/17 1327   Gross per 24 hour   Intake              490 ml   Output             1600 ml   Net            -1110 ml      Physical Exam   Constitutional: She is oriented to person, place, and time. She appears well-developed and well-nourished. No distress.   HENT:   Right Ear: External ear normal.   Left Ear: External ear normal.   Nose: Nose normal.   Eyes: EOM are normal. Pupils are equal, round, and reactive to light. No scleral icterus.   Neck: Normal range of motion. Neck supple. No thyromegaly present.   Cardiovascular: Normal rate and normal heart sounds.  Exam reveals no friction rub.    No murmur heard.  Pulmonary/Chest: Effort normal. No respiratory distress. She has no wheezes.   Abdominal: Soft. Bowel sounds are normal. She exhibits no mass. There is no tenderness.   Scattered ecchymoses from lovenox    Musculoskeletal: Normal range of motion. She exhibits no edema or deformity.   Neurological: She is alert and oriented to person, place, and time. No cranial nerve deficit.   Skin: Skin is warm and dry. No pallor.       Significant Labs:   CBC:   Recent Labs  Lab 04/25/17 0233 04/26/17  0358   WBC 8.92 9.25   HGB 10.4* 11.9*   HCT 33.2* 37.3    239     CMP:   Recent Labs  Lab 04/25/17 0233 04/26/17  0358    142   K 4.1 4.0    97   CO2 37* 37*   * 155*   BUN 17 20   CREATININE 0.7 0.7   CALCIUM 7.9* 8.4*   PROT 4.8* 5.4*   ALBUMIN 2.4* 2.7*   BILITOT 0.3 0.4   ALKPHOS 85 83   AST 12 14   ALT 16 17   ANIONGAP 7* 8   EGFRNONAA >60 >60

## 2017-04-26 NOTE — ASSESSMENT & PLAN NOTE
Continue zosyn, steroids, and breathing treatments.   Probably bacterial, but unable to determine.  Will complete 10 days of abx 4/27

## 2017-04-26 NOTE — PLAN OF CARE
Problem: Patient Care Overview  Goal: Plan of Care Review  Outcome: Ongoing (interventions implemented as appropriate)  Pt free from falls, injury or any further trauma throughout shift. Pt AAOx4. IV antibiotics continued, PO medications continued. Pt able to ambulate in hallway and room without difficulty. Continued checking O2 sat levels. Will cont to monitor.     04/26/17 1692   Coping/Psychosocial   Plan Of Care Reviewed With patient;family

## 2017-04-26 NOTE — ASSESSMENT & PLAN NOTE
Continue zosyn, steroids, and breathing treatments.   Probably bacterial, but unable to determine.  Consider oral antibiotic

## 2017-04-26 NOTE — PROGRESS NOTES
[facility] Hospital Medicine  Progress Note    Patient Name: Alina Menchaca  MRN: [id number]  Patient Class: IP- Inpatient   Admission Date: 4/18/2017  Length of Stay: 6 days  Attending Physician: Linda Wise MD  Primary Care Provider: Meghan Hanna MD        Subjective:     Principal Problem:Acute respiratory failure with hypoxia and hypercapnia    HPI:  Ms. Menchaca is a 47 yo woman with asthma, h/o seizures, anxiety and bipolar disorder who presented for cough and shortness of breath. Four days ago she started having shortness of breath and cough. Her coughing was so severe that she has not been able to sleep. She presented to the ER when her son noted her lips to be blue and checked her pulse ox which read in the 70s. She also reports fevers, wheezing, and pleuritic chest pain. She did drive to [place] 4/12/2017 and returned the day of presentation. Her mother passed away the day prior to presentation. In December, she was admitted for pneumonia and had to be intubated. She had since been doing well and does not require supplemental oxygen at home.    Hospital Course:  She was admitted for community-acquired pneumonia/asthma exacerbation and placed on moxifloxacin, methylprednisolone, and neb treatments. She was initially on BiPAP upon EMS arrival but was able to be weaned to nasal cannula in the ER without much difficulty. The night following admission her respiratory status worsened. She was noted to have increased infiltrates on CXR and an ABG of 7.3 pCO2 57 and pO2 of 64 on 100% non-rebreather. She was placed on rescue BiPAP and transferred to the ICU. Of note, she usually uses CPAP at home while sleeping but did not use one that night. She is also extremely anxious because of her mother's recent death. She improved with ativan and BiPAP. She was also given lasix 20mg IV due to the increased infiltrates on CXR.  4/21: Nocturnist called to evaluate patient last night because of  increased dyspnea.  Patient was stating that she needed to be intubated.  She refused ABG's.  Currently in ICU on high flow NC.  Steadily improving and able to wean down oxygen.  Remains on NC O2 at 5 liters, PT walked patient and she desaturated to 78% on 5 liters  She has been continued on nebs, oxygen, IV ABx's and IV steroids. Boarding in ICu while waiting for med-surg bed. Ok for transfer and needs more pulmonary care and likely home o2.       Interval History: pt reports feeling better and did well with PT except that O2 dropped on oxygen.    Review of Systems   HENT: Negative for ear discharge and ear pain.    Eyes: Negative for pain and redness.   Endocrine: Negative for polyphagia and polyuria.   Genitourinary: Negative for difficulty urinating and dysuria.     Objective:     Vital Signs (Most Recent):  Temp: 98.1 °F (36.7 °C) (04/25/17 2035)  Pulse: 96 (04/25/17 2035)  Resp: 20 (04/25/17 2035)  BP: (!) 117/59 (04/25/17 2035)  SpO2: 97 % (04/25/17 2035) Vital Signs (24h Range):  Temp:  [97.7 °F (36.5 °C)-98.1 °F (36.7 °C)] 98.1 °F (36.7 °C)  Pulse:  [] 96  Resp:  [18-64] 20  SpO2:  [95 %-100 %] 97 %  BP: (117-142)/(58-71) 117/59     Weight: 73.2 kg (161 lb 6 oz)  Body mass index is 31.52 kg/(m^2).    Intake/Output Summary (Last 24 hours) at 04/25/17 2110  Last data filed at 04/25/17 1900   Gross per 24 hour   Intake             1790 ml   Output             3750 ml   Net            -1960 ml      Physical Exam   Constitutional: She is oriented to person, place, and time. She appears well-developed and well-nourished. No distress.   HENT:   Right Ear: External ear normal.   Left Ear: External ear normal.   Nose: Nose normal.   Eyes: EOM are normal. Pupils are equal, round, and reactive to light. No scleral icterus.   Neck: Normal range of motion. Neck supple. No thyromegaly present.   Cardiovascular: Normal rate and normal heart sounds.  Exam reveals no friction rub.    No murmur heard.  Pulmonary/Chest:  Effort normal. No respiratory distress. She has no wheezes.   Abdominal: Soft. Bowel sounds are normal. She exhibits no mass. There is no tenderness.   Musculoskeletal: Normal range of motion. She exhibits no edema or deformity.   Neurological: She is alert and oriented to person, place, and time. No cranial nerve deficit.   Skin: Skin is warm and dry. No pallor.       Significant Labs:   BMP:   Recent Labs  Lab 04/25/17  0233   *      K 4.1      CO2 37*   BUN 17   CREATININE 0.7   CALCIUM 7.9*     CBC:   Recent Labs  Lab 04/24/17  0135 04/25/17  0233   WBC 9.28 8.92   HGB 10.1* 10.4*   HCT 32.3* 33.2*    217     POCT Glucose: No results for input(s): POCTGLUCOSE in the last 48 hours.    Significant Imaging: I have reviewed all pertinent imaging results/findings within the past 24 hours.    Assessment/Plan:      * Acute respiratory failure with hypoxia and hypercapnia  Due to pneumonia, asthma exacerbation.  Appreciate Pulmonary input.  Need to consider other etiologies.  Anxiety also probably contributing to dyspnea.  Cont abx, steroids, and nebs.   Currently on high flow NC, but steadily improving and able to wean down to 5L  Testing home O2 in am     Sleep disturbance  Continue home trazodone QHS.    Raynaud's syndrome        Tobacco abuse  Counseled on the importance of cessation. Nicotine patch.    Asthma exacerbation  As above    Bipolar 1 disorder  Appreciate Psych input.  Will follow recommended changes to medications.  Doing well with depakote, trazodone and cymbalta      Anemia of other chronic disease  Stable. Normocytic.    Moderate persistent asthma  Continue current management with steroids and nebulizer treatments.    Pneumonia of both lungs due to infectious organism  Continue zosyn, steroids, and breathing treatments.   Probably bacterial, but unable to determine.  Consider oral antibiotic     Bereavement reaction  Supportive care. I feel her anxiety and sorrow are  attributing to her perceived symptoms however she clearly objective signs of acute respiratory failure.  Improving.    Opioid use disorder, severe, dependence  Possible Suboxone withdrawal.  Supportive care.      Substance withdrawal  As above.      VTE Risk Mitigation         Ordered     enoxaparin injection 40 mg  Daily     Route:  Subcutaneous        04/21/17 1650     Medium Risk of VTE  Once      04/19/17 0558          Linda Wise MD  Department of Hospital Medicine   [facility]

## 2017-04-26 NOTE — ASSESSMENT & PLAN NOTE
Due to pneumonia, asthma exacerbation.  Appreciate Pulmonary input.  Need to consider other etiologies.  Anxiety also probably contributing to dyspnea.  Cont abx, steroids, and nebs.   Currently on high flow NC, but steadily improving and able to wean down to 5L  Testing home O2 in am

## 2017-04-26 NOTE — PLAN OF CARE
[facility]  HOME  HEALTH ORDERS     04/26/2017    Admit to Home Health    Diagnoses:  Active Hospital Problems    Diagnosis  POA    *Acute respiratory failure with hypoxia and hypercapnia [J96.01, J96.02]  Yes     Priority: 1 - High    Pneumonia of both lungs due to infectious organism [J18.9]  Yes     Priority: 1 - High    Asthma exacerbation [J45.901]  Yes     Priority: 2     Moderate persistent asthma [J45.40]  Yes     Priority: 3      Chronic    Tobacco abuse [Z72.0]  Yes     Priority: 4      Chronic    Opioid use disorder, severe, dependence [F11.20]  Yes    Substance withdrawal [F19.239]  Yes    Bereavement reaction [F43.20, Z63.4]  Not Applicable    Anemia of other chronic disease [D63.8]  Yes    Bipolar 1 disorder [F31.9]  Yes     Chronic    Sleep disturbance [G47.9]  Yes      Resolved Hospital Problems    Diagnosis Date Resolved POA   No resolved problems to display.       Patient is homebound due to:  Acute respiratory failure with hypoxia and hypercapnia    Allergies:Review of patient's allergies indicates:  No Known Allergies    Diet: regular    Acitivities: as tolerated    Nursing:   SN to complete comprehensive assessment including routine vital signs. Instruct on disease process and s/s of complications to report to MD. Review/verify medication list sent home with the patient at time of discharge  and instruct patient/caregiver as needed. Frequency may be adjusted depending on start of care date.    Notify MD if SBP > 160 or < 90; DBP > 90 or < 50; HR > 120 or < 50; Temp > 101; O2 sat <88%            _________________________________  Flavia Swenson MD  04/26/2017

## 2017-04-26 NOTE — NURSING TRANSFER
Nursing Transfer Note      4/25/2017     Transfer To: [address]    Transfer via wheelchair    Transfer with patient belongings and O2    Transported by RN and transport personnel    Medicines sent: 3 zosyns and amantadine     Chart send with patient: Yes    Notified: Attempted to call daughter (Gabino) at 2043, but no answer from phone number listed on sticky note.  2058-Daughter returned call, updated on patient's transfer    Last reassessment 4/25/17 2015    Upon arrival to floor: patient oriented to room, call bell in reach and bed in lowest position, medsurg nurse at bedside

## 2017-04-26 NOTE — ASSESSMENT & PLAN NOTE
Due to pneumonia, asthma exacerbation.  Appreciate Pulmonary input.   Anxiety also probably contributing to dyspnea.  Cont abx, steroids, and nebs.   Ambulating in room on RA satting 90%

## 2017-04-26 NOTE — PROGRESS NOTES
[facility] Hospital Medicine  Progress Note    Patient Name: Alina Menchaca  MRN: [id number]  Patient Class: IP- Inpatient   Admission Date: 4/18/2017  Length of Stay: 7 days  Attending Physician: Flavia Swenson MD  Primary Care Provider: Meghan Hanna MD        Subjective:     Principal Problem:Acute respiratory failure with hypoxia and hypercapnia    HPI:  Ms. Menchaca is a 45 yo woman with asthma, h/o seizures, anxiety and bipolar disorder who presented for cough and shortness of breath. Four days ago she started having shortness of breath and cough. Her coughing was so severe that she has not been able to sleep. She presented to the ER when her son noted her lips to be blue and checked her pulse ox which read in the 70s. She also reports fevers, wheezing, and pleuritic chest pain. She did drive to [place] 4/12/2017 and returned the day of presentation. Her mother passed away the day prior to presentation. In December, she was admitted for pneumonia and had to be intubated. She had since been doing well and does not require supplemental oxygen at home.    Hospital Course:  She was admitted for community-acquired pneumonia/asthma exacerbation and placed on moxifloxacin, methylprednisolone, and neb treatments. She was initially on BiPAP upon EMS arrival but was able to be weaned to nasal cannula in the ER without much difficulty. The night following admission her respiratory status worsened. She was noted to have increased infiltrates on CXR and an ABG of 7.3 pCO2 57 and pO2 of 64 on 100% non-rebreather. She was placed on rescue BiPAP and transferred to the ICU. Of note, she usually uses CPAP at home while sleeping but did not use one that night. She is also extremely anxious because of her mother's recent death. She improved with ativan and BiPAP. She was also given lasix 20mg IV due to the increased infiltrates on CXR.  4/21: Nocturnist called to evaluate patient last night because of  increased dyspnea.  Patient was stating that she needed to be intubated.  She refused ABG's.  Currently in ICU on high flow NC.  Steadily improved and able to wean down oxygen. Transferred to the floor 4/25 and stable.  She has been continued on nebs, oxygen, IV ABx's and IV steroids.       Review of Systems   Constitutional: Negative for chills and fever.   HENT: Negative for ear discharge and ear pain.    Eyes: Negative for pain and redness.   Respiratory: Negative for cough and wheezing.    Cardiovascular: Negative for chest pain and leg swelling.   Gastrointestinal: Negative for abdominal pain and constipation.   Endocrine: Negative for polyphagia and polyuria.   Genitourinary: Negative for difficulty urinating and dysuria.   Hematological: Negative for adenopathy. Bruises/bleeds easily.   Psychiatric/Behavioral: Negative for confusion. The patient is nervous/anxious.      Objective:     Vital Signs (Most Recent):  Temp: 98.5 °F (36.9 °C) (04/26/17 1305)  Pulse: 105 (04/26/17 1314)  Resp: 18 (04/26/17 1314)  BP: 124/76 (04/26/17 1305)  SpO2: 99 % (04/26/17 1314) Vital Signs (24h Range):  Temp:  [97.7 °F (36.5 °C)-99 °F (37.2 °C)] 98.5 °F (36.9 °C)  Pulse:  [] 105  Resp:  [16-40] 18  SpO2:  [90 %-99 %] 99 %  BP: (115-130)/(59-76) 124/76     Weight: 73.2 kg (161 lb 6 oz)  Body mass index is 31.52 kg/(m^2).    Intake/Output Summary (Last 24 hours) at 04/26/17 1429  Last data filed at 04/26/17 1327   Gross per 24 hour   Intake              490 ml   Output             1600 ml   Net            -1110 ml      Physical Exam   Constitutional: She is oriented to person, place, and time. She appears well-developed and well-nourished. No distress.   HENT:   Right Ear: External ear normal.   Left Ear: External ear normal.   Nose: Nose normal.   Eyes: EOM are normal. Pupils are equal, round, and reactive to light. No scleral icterus.   Neck: Normal range of motion. Neck supple. No thyromegaly present.   Cardiovascular:  Normal rate and normal heart sounds.  Exam reveals no friction rub.    No murmur heard.  Pulmonary/Chest: Effort normal. No respiratory distress. She has no wheezes.   Abdominal: Soft. Bowel sounds are normal. She exhibits no mass. There is no tenderness.   Scattered ecchymoses from lovenox   Musculoskeletal: Normal range of motion. She exhibits no edema or deformity.   Neurological: She is alert and oriented to person, place, and time. No cranial nerve deficit.   Skin: Skin is warm and dry. No pallor.       Significant Labs:   CBC:   Recent Labs  Lab 04/25/17 0233 04/26/17  0358   WBC 8.92 9.25   HGB 10.4* 11.9*   HCT 33.2* 37.3    239     CMP:   Recent Labs  Lab 04/25/17 0233 04/26/17 0358    142   K 4.1 4.0    97   CO2 37* 37*   * 155*   BUN 17 20   CREATININE 0.7 0.7   CALCIUM 7.9* 8.4*   PROT 4.8* 5.4*   ALBUMIN 2.4* 2.7*   BILITOT 0.3 0.4   ALKPHOS 85 83   AST 12 14   ALT 16 17   ANIONGAP 7* 8   EGFRNONAA >60 >60     Assessment/Plan:      * Acute respiratory failure with hypoxia and hypercapnia  Due to pneumonia, asthma exacerbation.  Appreciate Pulmonary input.   Anxiety also probably contributing to dyspnea.  Cont abx, steroids, and nebs.   Ambulating in room on RA satting 90%    Pneumonia of both lungs due to infectious organism  Continue zosyn, steroids, and breathing treatments.   Probably bacterial, but unable to determine.  Will complete 10 days of abx 4/27    Asthma exacerbation  As above    Moderate persistent asthma  Continue current management with steroids and nebulizer treatments.    Tobacco abuse  Counseled on the importance of cessation. Nicotine patch.    Sleep disturbance  Continue home trazodone QHS.    Bipolar 1 disorder  Appreciate Psych input.  Will follow recommended changes to medications.  Doing well with depakote, trazodone and cymbalta    Anemia of other chronic disease  Stable. Normocytic.    Bereavement reaction  Supportive care. I feel her anxiety and  guerarow are attributing to her perceived symptoms however she clearly objective signs of acute respiratory failure.  Improving.    Opioid use disorder, severe, dependence  Possible Suboxone withdrawal.  Supportive care.    Substance withdrawal  As above.      VTE Risk Mitigation         Ordered     Medium Risk of VTE  Once      04/19/17 0248          Flavia Swenson MD  Department of Hospital Medicine   [facility]

## 2017-04-26 NOTE — PLAN OF CARE
Problem: Patient Care Overview  Goal: Plan of Care Review  Outcome: Ongoing (interventions implemented as appropriate)  Pt currently AAOx4, no falls no injuries.  Pt calm.  Afebrile.  SpO2 WNL on 4L NC with humidifier.  Pain 7/10 to lower back and abdomen uncontrolled by PRN percocet, pt states that her respiratory treatments help a little.  Pt abdomen is severely bruised and tender due to lovenox shots, pt states that the soreness in her abdomen is related to this.  Breath sounds are clear equal and bilateral.  Will continue to monitor.

## 2017-04-26 NOTE — ASSESSMENT & PLAN NOTE
Appreciate Psych input.  Will follow recommended changes to medications.  Doing well with depakote, trazodone and cymbalta

## 2017-04-26 NOTE — NURSING
Pt ambulating in hallway, sat checked, at 82%. Pt instructed to go back to room and put oxygen on. Pt stated her cheek is twitching and feels like her muscles are thanh in her face. Will cont to monitor.

## 2017-04-26 NOTE — SUBJECTIVE & OBJECTIVE
Interval History: pt reports feeling better and did well with PT except that O2 dropped on oxygen.    Review of Systems   HENT: Negative for ear discharge and ear pain.    Eyes: Negative for pain and redness.   Endocrine: Negative for polyphagia and polyuria.   Genitourinary: Negative for difficulty urinating and dysuria.     Objective:     Vital Signs (Most Recent):  Temp: 98.1 °F (36.7 °C) (04/25/17 2035)  Pulse: 96 (04/25/17 2035)  Resp: 20 (04/25/17 2035)  BP: (!) 117/59 (04/25/17 2035)  SpO2: 97 % (04/25/17 2035) Vital Signs (24h Range):  Temp:  [97.7 °F (36.5 °C)-98.1 °F (36.7 °C)] 98.1 °F (36.7 °C)  Pulse:  [] 96  Resp:  [18-64] 20  SpO2:  [95 %-100 %] 97 %  BP: (117-142)/(58-71) 117/59     Weight: 73.2 kg (161 lb 6 oz)  Body mass index is 31.52 kg/(m^2).    Intake/Output Summary (Last 24 hours) at 04/25/17 2110  Last data filed at 04/25/17 1900   Gross per 24 hour   Intake             1790 ml   Output             3750 ml   Net            -1960 ml      Physical Exam   Constitutional: She is oriented to person, place, and time. She appears well-developed and well-nourished. No distress.   HENT:   Right Ear: External ear normal.   Left Ear: External ear normal.   Nose: Nose normal.   Eyes: EOM are normal. Pupils are equal, round, and reactive to light. No scleral icterus.   Neck: Normal range of motion. Neck supple. No thyromegaly present.   Cardiovascular: Normal rate and normal heart sounds.  Exam reveals no friction rub.    No murmur heard.  Pulmonary/Chest: Effort normal. No respiratory distress. She has no wheezes.   Abdominal: Soft. Bowel sounds are normal. She exhibits no mass. There is no tenderness.   Musculoskeletal: Normal range of motion. She exhibits no edema or deformity.   Neurological: She is alert and oriented to person, place, and time. No cranial nerve deficit.   Skin: Skin is warm and dry. No pallor.       Significant Labs:   BMP:   Recent Labs  Lab 04/25/17  0233   *       K 4.1      CO2 37*   BUN 17   CREATININE 0.7   CALCIUM 7.9*     CBC:   Recent Labs  Lab 04/24/17  0135 04/25/17  0233   WBC 9.28 8.92   HGB 10.1* 10.4*   HCT 32.3* 33.2*    217     POCT Glucose: No results for input(s): POCTGLUCOSE in the last 48 hours.    Significant Imaging: I have reviewed all pertinent imaging results/findings within the past 24 hours.

## 2017-04-27 VITALS
HEART RATE: 87 BPM | DIASTOLIC BLOOD PRESSURE: 75 MMHG | RESPIRATION RATE: 18 BRPM | TEMPERATURE: 98 F | HEIGHT: 60 IN | BODY MASS INDEX: 31.68 KG/M2 | OXYGEN SATURATION: 95 % | WEIGHT: 161.38 LBS | SYSTOLIC BLOOD PRESSURE: 138 MMHG

## 2017-04-27 LAB
ALBUMIN SERPL BCP-MCNC: 2.8 G/DL
ALP SERPL-CCNC: 69 U/L
ALT SERPL W/O P-5'-P-CCNC: 18 U/L
ANION GAP SERPL CALC-SCNC: 7 MMOL/L
AST SERPL-CCNC: 16 U/L
BASOPHILS # BLD AUTO: ABNORMAL K/UL
BASOPHILS NFR BLD: 0 %
BILIRUB SERPL-MCNC: 0.3 MG/DL
BUN SERPL-MCNC: 22 MG/DL
CALCIUM SERPL-MCNC: 8.5 MG/DL
CHLORIDE SERPL-SCNC: 100 MMOL/L
CO2 SERPL-SCNC: 36 MMOL/L
CREAT SERPL-MCNC: 0.8 MG/DL
DIFFERENTIAL METHOD: ABNORMAL
EOSINOPHIL # BLD AUTO: ABNORMAL K/UL
EOSINOPHIL NFR BLD: 0 %
ERYTHROCYTE [DISTWIDTH] IN BLOOD BY AUTOMATED COUNT: 13.7 %
EST. GFR  (AFRICAN AMERICAN): >60 ML/MIN/1.73 M^2
EST. GFR  (NON AFRICAN AMERICAN): >60 ML/MIN/1.73 M^2
GLUCOSE SERPL-MCNC: 113 MG/DL
HCT VFR BLD AUTO: 34.2 %
HGB BLD-MCNC: 10.9 G/DL
HYPOCHROMIA BLD QL SMEAR: ABNORMAL
LYMPHOCYTES # BLD AUTO: ABNORMAL K/UL
LYMPHOCYTES NFR BLD: 17 %
MCH RBC QN AUTO: 28.8 PG
MCHC RBC AUTO-ENTMCNC: 31.9 %
MCV RBC AUTO: 91 FL
METAMYELOCYTES NFR BLD MANUAL: 1 %
MONOCYTES # BLD AUTO: ABNORMAL K/UL
MONOCYTES NFR BLD: 10 %
MYELOCYTES NFR BLD MANUAL: 6 %
NEUTROPHILS NFR BLD: 64 %
NEUTS BAND NFR BLD MANUAL: 2 %
PLATELET # BLD AUTO: 214 K/UL
PMV BLD AUTO: 9.7 FL
POLYCHROMASIA BLD QL SMEAR: ABNORMAL
POTASSIUM SERPL-SCNC: 4.1 MMOL/L
PROT SERPL-MCNC: 5.3 G/DL
RBC # BLD AUTO: 3.78 M/UL
SODIUM SERPL-SCNC: 143 MMOL/L
WBC # BLD AUTO: 8.81 K/UL

## 2017-04-27 PROCEDURE — 85007 BL SMEAR W/DIFF WBC COUNT: CPT

## 2017-04-27 PROCEDURE — 94640 AIRWAY INHALATION TREATMENT: CPT

## 2017-04-27 PROCEDURE — 25000003 PHARM REV CODE 250: Performed by: INTERNAL MEDICINE

## 2017-04-27 PROCEDURE — 25000003 PHARM REV CODE 250: Performed by: HOSPITALIST

## 2017-04-27 PROCEDURE — 27000221 HC OXYGEN, UP TO 24 HOURS

## 2017-04-27 PROCEDURE — 63600175 PHARM REV CODE 636 W HCPCS: Performed by: INTERNAL MEDICINE

## 2017-04-27 PROCEDURE — 85027 COMPLETE CBC AUTOMATED: CPT

## 2017-04-27 PROCEDURE — 80053 COMPREHEN METABOLIC PANEL: CPT

## 2017-04-27 PROCEDURE — 25000242 PHARM REV CODE 250 ALT 637 W/ HCPCS: Performed by: EMERGENCY MEDICINE

## 2017-04-27 PROCEDURE — 36415 COLL VENOUS BLD VENIPUNCTURE: CPT

## 2017-04-27 RX ORDER — DIVALPROEX SODIUM 250 MG/1
250 TABLET, DELAYED RELEASE ORAL EVERY 8 HOURS
Qty: 90 TABLET | Refills: 3 | Status: SHIPPED | OUTPATIENT
Start: 2017-04-27 | End: 2020-05-28

## 2017-04-27 RX ORDER — DULOXETIN HYDROCHLORIDE 30 MG/1
30 CAPSULE, DELAYED RELEASE ORAL DAILY
Qty: 30 CAPSULE | Refills: 3 | Status: SHIPPED | OUTPATIENT
Start: 2017-04-27 | End: 2020-05-12

## 2017-04-27 RX ORDER — ALBUTEROL SULFATE 0.83 MG/ML
2.5 SOLUTION RESPIRATORY (INHALATION) EVERY 6 HOURS PRN
Qty: 30 BOX | Refills: 0 | Status: SHIPPED | OUTPATIENT
Start: 2017-04-27 | End: 2020-05-28

## 2017-04-27 RX ORDER — PREDNISONE 20 MG/1
10 TABLET ORAL DAILY
Qty: 3 TABLET | Refills: 0 | Status: SHIPPED | OUTPATIENT
Start: 2017-04-27 | End: 2017-05-03

## 2017-04-27 RX ORDER — FLUTICASONE FUROATE AND VILANTEROL 100; 25 UG/1; UG/1
1 POWDER RESPIRATORY (INHALATION) DAILY
Qty: 60 EACH | Refills: 3 | Status: SHIPPED | OUTPATIENT
Start: 2017-04-27

## 2017-04-27 RX ORDER — PROMETHAZINE HYDROCHLORIDE AND CODEINE PHOSPHATE 6.25; 1 MG/5ML; MG/5ML
5 SOLUTION ORAL EVERY 4 HOURS PRN
Qty: 240 ML | Refills: 0 | Status: SHIPPED | OUTPATIENT
Start: 2017-04-27 | End: 2017-05-07

## 2017-04-27 RX ADMIN — DIVALPROEX SODIUM 250 MG: 250 TABLET, DELAYED RELEASE ORAL at 05:04

## 2017-04-27 RX ADMIN — PANTOPRAZOLE SODIUM 40 MG: 40 TABLET, DELAYED RELEASE ORAL at 09:04

## 2017-04-27 RX ADMIN — ACETAMINOPHEN 650 MG: 325 TABLET, FILM COATED ORAL at 05:04

## 2017-04-27 RX ADMIN — DULOXETINE 30 MG: 30 CAPSULE, DELAYED RELEASE ORAL at 09:04

## 2017-04-27 RX ADMIN — OXYCODONE HYDROCHLORIDE AND ACETAMINOPHEN 1 TABLET: 5; 325 TABLET ORAL at 01:04

## 2017-04-27 RX ADMIN — OXYCODONE HYDROCHLORIDE AND ACETAMINOPHEN 1 TABLET: 5; 325 TABLET ORAL at 11:04

## 2017-04-27 RX ADMIN — PREDNISONE 20 MG: 20 TABLET ORAL at 09:04

## 2017-04-27 RX ADMIN — IPRATROPIUM BROMIDE AND ALBUTEROL SULFATE 3 ML: .5; 3 SOLUTION RESPIRATORY (INHALATION) at 07:04

## 2017-04-27 RX ADMIN — IPRATROPIUM BROMIDE AND ALBUTEROL SULFATE 3 ML: .5; 3 SOLUTION RESPIRATORY (INHALATION) at 02:04

## 2017-04-27 RX ADMIN — METHOCARBAMOL 500 MG: 500 TABLET ORAL at 01:04

## 2017-04-27 RX ADMIN — NICOTINE 1 PATCH: 21 PATCH, EXTENDED RELEASE TRANSDERMAL at 09:04

## 2017-04-27 RX ADMIN — PIPERACILLIN AND TAZOBACTAM 4.5 G: 4; .5 INJECTION, POWDER, LYOPHILIZED, FOR SOLUTION INTRAVENOUS; PARENTERAL at 04:04

## 2017-04-27 RX ADMIN — PROMETHAZINE HYDROCHLORIDE AND CODEINE PHOSPHATE 5 ML: 6.25; 1 SYRUP ORAL at 02:04

## 2017-04-27 RX ADMIN — FLUTICASONE FUROATE AND VILANTEROL TRIFENATATE 1 PUFF: 100; 25 POWDER RESPIRATORY (INHALATION) at 08:04

## 2017-04-27 NOTE — PLAN OF CARE
04/27/17 1220   Final Note   Assessment Type Final Discharge Note   Discharge Disposition Home-Health   Discharge planning education complete? Yes   What phone number can be called within the next 1-3 days to see how you are doing after discharge? [phone]   Hospital Follow Up  Appt(s) scheduled? Yes   Discharge plans and expectations educations in teach back method with documentation complete? Yes   Offered [facility] -- Bedside Delivery? n/a   Discharge/Hospital Encounter Summary to (non[facility]) PCP n/a   Referral to Outpatient Case Management complete? n/a   Referral to / orders for Home Health Complete? Yes   30 day supply of medicines given at discharge, if documented non-compliance / non-adherence? n/a   Any social issues identified prior to discharge? n/a   Did you assess the readiness or willingness of the family or caregiver to support self management of care? Yes     According to pt she does not have a PCP and would like for PERNELL to schedule an appointment with a PCP doctor. PERNELL scheduled pt [facility] for 5/5 @ 10:30am.     COPD Self Management Plan discussed with patient     Pt presented with education information on COPD Self Management Plan. Info presented:  Green is where you want to be:  No cough, SOB, wheezing, chest tightness or decrease in maintaining your activity level.  Yellow is the Warning zone with the following signs and symptoms: Increased cough, SOB or and increase in quick release meds used.   Red zone:  Emergent signs and symptoms:  Patient has:  Unrelieved SOB, SOB at rest, fever and shaking chills, wheezing or chest tightness at rest, Irregular or increased Heart Beat, Changes in skin color, nail beds, lips are gray or blue, confusion and / coughing up blood.  Time to call 911.   Teach back method used.  All questions answered      Patient presented with educational information on Pneumonia.  Information given to patient in blue folder to be brought home for  patient to use as resource after discharge.  Information included:  signs and symptoms of pneumonia, and treatments for the disease process.   Importance of follow up appointments and taking madication as prescribed were discussed with patient.  Also discussed  Were symptoms that would prompt patient to call MD and symptoms that would prompt patient to seek care immediately.  Patient verbalized understanding of all information, all questions.  Teach back method used.     Pt able to verbalize signs and symptoms that will cause her to contact PCP and get to an emergency room immediately.     PERNELL provided pt with a copy of follow-up appointments. SW explained/highlighted date, time, and location of each appointment. PERNELL provided pt with a blue folder and instructed pt to place all medical documents in blue folder. PERNELL explained to pt the nurse will provide an AVS with diagnosis and instructed pt to place in blue folder and bring to follow-up appointment. PERNELL spoke with pt concerning managing her care at home. SW informed pt important things to remember when managing her care at home: pickup medication, take medication as prescribed, and make all scheduled follow-up appointments. Pt verbalized understanding.     Orders received for HH, PERNELL sent pt clinicals via right care to the following  [place]-DECLINED   [facility]-DECLINED   [facility]-DECLINED   [facility]-DECLINED   [facility]   [facility]   [facility]   [facility]   [facility]   [facility]    PERNELL inform Dr. Swenson the pt has been declined by the following facilities and asked if the pt can discharge without HH. Dr. Swenson stated ok to discharge without HH as she has strong family support. PERNELL provided pt with a copy of follow-up appointments. PERNELL explained/highlighted date, time, and location of each appointment. PERNELL provided pt with a blue folder and instructed pt to place all medical documents in blue folder. PERNELL explained to pt the nurse will  provide an AVS with diagnosis and instructed pt to place in blue folder and bring to follow-up appointment.

## 2017-04-27 NOTE — PROGRESS NOTES
Follow-up Information     Follow up with [facility] On 5/5/2017.    Why:  Outpatient Services, PCP follow-up appointment. Patient should arrive by 10:30AM.     Contact information:    [address]  [address]  [facility] [address]  [phone]        PLEASE BRING TO ALL FOLLOW UP APPOINTMENTS:   A COPY YOUR DISCHARGE INSTRUCTIONS, Any new MEDICINES YOU ARE CURRENTLY TAKING IN THEIR ORIGINAL BOTTLES  And IDENTIFICATION AND INSURANCE CARD     **PLEASE ARRIVE 15 MINUTES AHEAD OF SCHEDULED APPOINTMENT TIME   ++PLEASE CALL 24 HOURS IN ADVANCE IF YOU MUST RESCHEDULE YOUR APPOINTMENT DAY AND/OR TIME     Help at Home [phone]  After discharge for assistance [facility] On [facility] Nurse Care Line 24/7 Assistance     Things You are responsible For To Manage Your Care At Home:  1.    Getting your prescriptions filled   2.    Taking your medications as directed, DO NOT MISS ANY DOSES!  3.    Going to your follow-up doctor appointment. This is important because it  allow the doctor to monitor your progress and determine if  any changes need to made to your treatment plan.     Thank you for choosing [facility] for your care.  Please answer any calls you may receive from [facility] we want to continue to support you as you manage your healthcare needs. [facility] is happy to have the opportunity to serve you.    Thank you for choosing [facility] for your care. Within 48-72 hours after leaving the hospital you will receive a call from [facility] Nurses following up to see how you are doing. The team will ask you a few questions and the call will last approximately 20 minutes.     Marguerite Wheeler BS, MSW, CSW  [phone]  Care Management

## 2017-04-27 NOTE — PROGRESS NOTES
visited with new orders noted. Pt.discharged. Follow up arrangement per case management. Pt.ready for discharge. [facility] d/c earlier per pt.request. Discharge instr.given. Awaiting ride home.

## 2017-04-27 NOTE — PLAN OF CARE
Problem: Patient Care Overview  Goal: Plan of Care Review  Outcome: Ongoing (interventions implemented as appropriate)  Pt currently AAOx4, no falls no injuries.  SpO2 WNL on 2L NC.  Breath sounds clear and equal bilaterally.  Pain 7/10 partially controlled by PRN medications.  Cough controlled by PRN medication.  Will continue to monitor.

## 2017-04-29 NOTE — ASSESSMENT & PLAN NOTE
Due to pneumonia, asthma exacerbation.  Appreciate Pulmonary input.   Anxiety also probably contributing to dyspnea.  Abx, steroids, and nebs.   Ambulating in room on RA satting 90%

## 2017-04-29 NOTE — DISCHARGE SUMMARY
[facility] Hospital Medicine  Discharge Summary      Patient Name: Alina Menchaca  MRN: [id number]  Admission Date: 4/18/2017  Hospital Length of Stay: 8 days  Discharge Date and Time: 4/27/2017  1:39 PM  Attending Physician: No att. providers found   Discharging Provider: Flavia Swenson MD  Primary Care Provider: Meghan Hanna MD      HPI:   Ms. Menchaca is a 45 yo woman with asthma, h/o seizures, anxiety and bipolar disorder who presented for cough and shortness of breath. Four days ago she started having shortness of breath and cough. Her coughing was so severe that she has not been able to sleep. She presented to the ER when her son noted her lips to be blue and checked her pulse ox which read in the 70s. She also reports fevers, wheezing, and pleuritic chest pain. She did drive to [place] 4/12/2017 and returned the day of presentation. Her mother passed away the day prior to presentation. In December, she was admitted for pneumonia and had to be intubated. She had since been doing well and does not require supplemental oxygen at home.    Hospital Course:   She was admitted for community-acquired pneumonia/asthma exacerbation and placed on moxifloxacin, methylprednisolone, and neb treatments. She was initially on BiPAP upon EMS arrival but was able to be weaned to nasal cannula in the ER without much difficulty. The night following admission her respiratory status worsened. She was noted to have increased infiltrates on CXR and an ABG of 7.3 pCO2 57 and pO2 of 64 on 100% non-rebreather. She was placed on rescue BiPAP and transferred to the ICU. Of note, she usually uses CPAP at home while sleeping but did not use one that night. She is also extremely anxious because of her mother's recent death. She improved with ativan and BiPAP. She was also given lasix 20mg IV due to the increased infiltrates on CXR.  4/21: Nocturnist called to evaluate patient last night because of increased dyspnea.   Patient was stating that she needed to be intubated.  She refused ABG's.  She was currently in ICU on high flow NC.  She steadily improved and we were able to wean down oxygen. Transferred to the floor 4/25 and stable.  She was continued on nebs, IV ABx's and IV steroids. O2 was weaned down. She was discharged on a steroid taper. She completed abx during admission.     Consults         Status Ordering Provider     Inpatient consult to Psychiatry  Once     Provider:  Alberto Sheriff MD    Completed PAVEL LAWRENCE     Inpatient consult to Pulmonology  Once     Provider:  Arainna Raymundo MD    Completed SAL HEREDIA        Final Active Diagnoses:    Diagnosis Date Noted POA    PRINCIPAL PROBLEM:  Acute respiratory failure with hypoxia and hypercapnia [J96.01, J96.02] 12/12/2016 Yes    Pneumonia of both lungs due to infectious organism [J18.9] 04/19/2017 Yes    Asthma exacerbation [J45.901] 12/06/2016 Yes    Moderate persistent asthma [J45.40] 12/12/2016 Yes     Chronic    Tobacco abuse [Z72.0] 12/06/2016 Yes     Chronic    Opioid use disorder, severe, dependence [F11.20] 04/21/2017 Yes    Substance withdrawal [F19.239] 04/21/2017 Yes    Bereavement reaction [F43.20, Z63.4] 04/20/2017 Not Applicable    Anemia of other chronic disease [D63.8] 12/07/2016 Yes    Bipolar 1 disorder [F31.9] 12/06/2016 Yes     Chronic    Sleep disturbance [G47.9] 12/29/2014 Yes      Problems Resolved During this Admission:    Diagnosis Date Noted Date Resolved POA      * Acute respiratory failure with hypoxia and hypercapnia  Due to pneumonia, asthma exacerbation.  Appreciate Pulmonary input.   Anxiety also probably contributing to dyspnea.  Abx, steroids, and nebs.   Ambulating in room on RA satting 90%    Pneumonia of both lungs due to infectious organism  Continue zosyn, steroids, and breathing treatments.   Probably bacterial, but unable to determine.  Will completed 10 days of abx 4/27    Asthma exacerbation  As  above    Moderate persistent asthma  steroids and nebulizer treatments.    Tobacco abuse  Counseled on the importance of cessation. Nicotine patch.    Sleep disturbance  Continue home trazodone QHS.    Bipolar 1 disorder  Appreciate Psych input.  Will follow recommended changes to medications.  Doing well with depakote, trazodone and cymbalta    Anemia of other chronic disease  Stable. Normocytic.    Bereavement reaction  Supportive care. I feel her anxiety and sorrow are attributing to her perceived symptoms however she clearly objective signs of acute respiratory failure.  Improving.    Opioid use disorder, severe, dependence  Possible Suboxone withdrawal. Was taking suboxone which was not prescribed to her.  Supportive care.    Substance withdrawal  As above.    Discharged Condition: stable    Disposition: Home or Self Care    Follow Up:  Follow-up Information     Follow up with [facility] On 5/5/2017.    Why:  Outpatient Services, PCP follow-up appointment. Patient should arrive by 10:30AM.     Contact information:    [address]  [address] [place] [id number]  [phone]          Patient Instructions:     Diet general     Activity as tolerated     Call MD for:  temperature >100.4     Call MD for:  persistent nausea and vomiting or diarrhea     Call MD for:  severe uncontrolled pain     Call MD for:  redness, tenderness, or signs of infection (pain, swelling, redness, odor or green/yellow discharge around incision site)     Call MD for:  difficulty breathing or increased cough     Call MD for:  severe persistent headache     Call MD for:  worsening rash     Call MD for:  persistent dizziness, light-headedness, or visual disturbances     Call MD for:  increased confusion or weakness       Medications:  Reconciled Home Medications:   Discharge Medication List as of 4/27/2017  1:00 PM      START taking these medications    Details   divalproex (DEPAKOTE) 250 MG EC tablet  Take 1 tablet (250 mg total) by mouth every 8 (eight) hours., Starting 4/27/2017, Until Fri 4/27/18, Normal      duloxetine (CYMBALTA) 30 MG capsule Take 1 capsule (30 mg total) by mouth once daily., Starting 4/27/2017, Until Fri 4/27/18, Normal      fluticasone-vilanterol (BREO) 100-25 mcg/dose diskus inhaler Inhale 1 puff into the lungs once daily. Controller, Starting 4/27/2017, Until Discontinued, Normal      predniSONE (DELTASONE) 20 MG tablet Take 0.5 tablets (10 mg total) by mouth once daily., Starting 4/27/2017, Until Wed 5/3/17, Normal      promethazine-codeine 6.25-10 mg/5 ml (PHENERGAN WITH CODEINE) 6.25-10 mg/5 mL syrup Take 5 mLs by mouth every 4 (four) hours as needed for Cough., Starting 4/27/2017, Until Sun 5/7/17, Print         CONTINUE these medications which have CHANGED    Details   albuterol (PROVENTIL) 2.5 mg /3 mL (0.083 %) nebulizer solution Take 3 mLs (2.5 mg total) by nebulization every 6 (six) hours as needed for Wheezing., Starting 4/27/2017, Until Sat 5/27/17, Print         CONTINUE these medications which have NOT CHANGED    Details   trazodone (DESYREL) 100 MG tablet Take 100 mg by mouth every evening., Until Discontinued, Historical Med         STOP taking these medications       citalopram (CELEXA) 40 MG tablet Comments:   Reason for Stopping:         FLUTICASONE/SALMETEROL (ADVAIR DISKUS INHL) Comments:   Reason for Stopping:         furosemide (LASIX) 20 MG tablet Comments:   Reason for Stopping:         HYDROXYZINE PAMOATE (VISTARIL ORAL) Comments:   Reason for Stopping:         oxycodone-acetaminophen (PERCOCET) 5-325 mg per tablet Comments:   Reason for Stopping:         alprazolam (XANAX) 0.5 MG tablet Comments:   Reason for Stopping:         amantadine HCl (SYMMETREL) 100 mg capsule Comments:   Reason for Stopping:             Time spent on the discharge of patient: 45 minutes    Flavia Swneson MD  Department of Hospital Medicine  [facility]

## 2017-04-29 NOTE — ASSESSMENT & PLAN NOTE
Continue zosyn, steroids, and breathing treatments.   Probably bacterial, but unable to determine.  Will completed 10 days of abx 4/27

## 2017-04-29 NOTE — ASSESSMENT & PLAN NOTE
Possible Suboxone withdrawal. Was taking suboxone which was not prescribed to her.  Supportive care.

## 2017-05-01 ENCOUNTER — PATIENT OUTREACH (OUTPATIENT)
Dept: ADMINISTRATIVE | Facility: CLINIC | Age: 47
End: 2017-05-01
Payer: MEDICAID

## 2017-05-01 NOTE — PROGRESS NOTES
C3 nurse contacted [facility] after many attempts of getting disconnected, vce mail etc.  C3 nurse was able to reach Ryan and patient's appt was rescheduled for tomorrow @ 0900.    C3 nurse contacted patient and she and friend Alexa are aware of new appt; Verbalized understanding.

## 2017-05-01 NOTE — PATIENT INSTRUCTIONS
Discharge Instructions for Pneumonia  You have been diagnosed with pneumonia, a serious lung infection. Most cases of pneumonia are caused by bacteria. Pneumonia most often occurs in older adults, young children, and people with chronic health problems.  Home Care  Take your medication exactly as directed. Dont skip doses. Continue taking your antibiotics as directed until they are all gone--even if you start to feel better. This will prevent the pneumonia from coming back.  Drink at least 8 glasses of water daily, unless directed otherwise. This helps to loosen and thin secretions so that you can cough them up.  Use a cool-mist humidifier in your bedroom. Be sure to clean the humidifier daily.  Coughing up mucus is normal. Dont use medications to suppress your cough unless your cough is dry, painful, or interferes with your sleep. You may use an expectorant if ordered by your doctor.  Warm compresses or a moist heating pad on the lowest setting can be used to relieve chest discomfort. Use several times a day for 15-20 minutes at a time. (To prevent injuring your skin, be sure the temperature of the compress or heating pad is warm, not hot.)  Get plenty of rest until your fever, shortness of breath, and chest pain go away.  Plan to get a flu shot every year.  Ask your doctor about a pneumonia vaccination.  Follow-Up  Make a follow-up appointment as directed by our staff.    When to Seek Medical Attention  Call 911 right away if you have any of the following:  Chest pain  Trouble breathing  Blue lips or fingernails  Otherwise, call your doctor if you have any of the following:  Fever above 100.4°F  Yellow, green, bloody, or smelly sputum  More than normal mucus production  Vomiting   © 5980-0294 [employer], [address] [place] [address], [place] [address]. All rights reserved. This information is not intended as a substitute for professional medical care. Always follow your healthcare professional's instructions.

## 2017-05-04 ENCOUNTER — CLINICAL SUPPORT (OUTPATIENT)
Dept: SMOKING CESSATION | Facility: CLINIC | Age: 47
End: 2017-05-04
Payer: COMMERCIAL

## 2017-05-04 VITALS — WEIGHT: 160.94 LBS | HEIGHT: 60 IN | BODY MASS INDEX: 31.6 KG/M2

## 2017-05-04 DIAGNOSIS — F17.200 TOBACCO USE DISORDER: Primary | ICD-10-CM

## 2017-05-04 PROCEDURE — 99999 PR PBB SHADOW E&M-EST. PATIENT-LVL II: CPT | Mod: PBBFAC,,,

## 2017-05-04 PROCEDURE — 99404 PREV MED CNSL INDIV APPRX 60: CPT | Mod: S$GLB,,,

## 2017-05-04 RX ORDER — IBUPROFEN 200 MG
1 TABLET ORAL DAILY
Qty: 14 PATCH | Refills: 0 | Status: SHIPPED | OUTPATIENT
Start: 2017-05-04 | End: 2017-05-17 | Stop reason: SDUPTHER

## 2017-05-10 ENCOUNTER — CLINICAL SUPPORT (OUTPATIENT)
Dept: SMOKING CESSATION | Facility: CLINIC | Age: 47
End: 2017-05-10
Payer: COMMERCIAL

## 2017-05-10 DIAGNOSIS — F17.200 TOBACCO USE DISORDER: Primary | ICD-10-CM

## 2017-05-10 PROCEDURE — 99407 BEHAV CHNG SMOKING > 10 MIN: CPT | Mod: S$GLB,,,

## 2017-05-17 ENCOUNTER — CLINICAL SUPPORT (OUTPATIENT)
Dept: SMOKING CESSATION | Facility: CLINIC | Age: 47
End: 2017-05-17
Payer: COMMERCIAL

## 2017-05-17 DIAGNOSIS — F17.200 TOBACCO USE DISORDER: Primary | ICD-10-CM

## 2017-05-17 PROCEDURE — 99407 BEHAV CHNG SMOKING > 10 MIN: CPT | Mod: S$GLB,,,

## 2017-05-17 RX ORDER — MICONAZOLE NITRATE 2 %
2 CREAM (GRAM) TOPICAL
Qty: 140 EACH | Refills: 0 | Status: SHIPPED | OUTPATIENT
Start: 2017-05-17 | End: 2020-05-12

## 2017-05-17 RX ORDER — IBUPROFEN 200 MG
1 TABLET ORAL DAILY
Qty: 14 PATCH | Refills: 0 | Status: SHIPPED | OUTPATIENT
Start: 2017-05-17 | End: 2017-05-31

## 2017-05-24 ENCOUNTER — CLINICAL SUPPORT (OUTPATIENT)
Dept: SMOKING CESSATION | Facility: CLINIC | Age: 47
End: 2017-05-24
Payer: COMMERCIAL

## 2017-05-24 DIAGNOSIS — F17.200 TOBACCO USE DISORDER: Primary | ICD-10-CM

## 2017-05-24 PROCEDURE — 99407 BEHAV CHNG SMOKING > 10 MIN: CPT | Mod: S$GLB,,,

## 2017-12-27 ENCOUNTER — OFFICE VISIT (OUTPATIENT)
Dept: URGENT CARE | Facility: CLINIC | Age: 47
End: 2017-12-27
Payer: MEDICAID

## 2017-12-27 VITALS
HEART RATE: 89 BPM | RESPIRATION RATE: 19 BRPM | TEMPERATURE: 97 F | DIASTOLIC BLOOD PRESSURE: 70 MMHG | BODY MASS INDEX: 29.83 KG/M2 | WEIGHT: 158 LBS | HEIGHT: 61 IN | OXYGEN SATURATION: 98 % | SYSTOLIC BLOOD PRESSURE: 114 MMHG

## 2017-12-27 DIAGNOSIS — J20.9 ACUTE BRONCHITIS, UNSPECIFIED ORGANISM: Primary | ICD-10-CM

## 2017-12-27 PROCEDURE — 99203 OFFICE O/P NEW LOW 30 MIN: CPT | Mod: S$GLB,,, | Performed by: PHYSICIAN ASSISTANT

## 2017-12-27 RX ORDER — FUROSEMIDE 20 MG/1
TABLET ORAL
COMMUNITY
Start: 2017-12-27 | End: 2020-05-12

## 2017-12-27 RX ORDER — BENZONATATE 100 MG/1
100 CAPSULE ORAL 3 TIMES DAILY PRN
Qty: 30 CAPSULE | Refills: 0 | Status: SHIPPED | OUTPATIENT
Start: 2017-12-27 | End: 2018-12-27

## 2017-12-27 RX ORDER — CITALOPRAM 20 MG/1
TABLET, FILM COATED ORAL
COMMUNITY
Start: 2017-12-22 | End: 2020-05-12

## 2017-12-27 RX ORDER — HYDROCODONE BITARTRATE AND ACETAMINOPHEN 10; 325 MG/1; MG/1
1 TABLET ORAL 2 TIMES DAILY PRN
Refills: 0 | COMMUNITY
Start: 2017-12-11 | End: 2020-03-10

## 2017-12-27 RX ORDER — LOSARTAN POTASSIUM 25 MG/1
TABLET ORAL
COMMUNITY
Start: 2017-12-27 | End: 2020-05-12

## 2017-12-27 RX ORDER — ZIPRASIDONE HYDROCHLORIDE 40 MG/1
CAPSULE ORAL
COMMUNITY
Start: 2017-12-22 | End: 2020-03-10

## 2017-12-27 RX ORDER — FLUTICASONE PROPIONATE 220 UG/1
AEROSOL, METERED RESPIRATORY (INHALATION)
COMMUNITY
Start: 2017-12-27 | End: 2020-03-10

## 2017-12-27 RX ORDER — HYDROXYZINE PAMOATE 50 MG/1
CAPSULE ORAL
COMMUNITY
Start: 2017-12-22 | End: 2019-07-07 | Stop reason: HOSPADM

## 2017-12-27 RX ORDER — DEXAMETHASONE SODIUM PHOSPHATE 100 MG/10ML
7 INJECTION INTRAMUSCULAR; INTRAVENOUS
Status: DISCONTINUED | OUTPATIENT
Start: 2017-12-27 | End: 2017-12-27

## 2017-12-27 RX ORDER — DEXAMETHASONE SODIUM PHOSPHATE 100 MG/10ML
5 INJECTION INTRAMUSCULAR; INTRAVENOUS
Status: COMPLETED | OUTPATIENT
Start: 2017-12-27 | End: 2017-12-27

## 2017-12-27 RX ADMIN — DEXAMETHASONE SODIUM PHOSPHATE 5 MG: 100 INJECTION INTRAMUSCULAR; INTRAVENOUS at 10:12

## 2017-12-27 NOTE — PROGRESS NOTES
"Subjective:       Patient ID: Alina Menchaca is a 47 y.o. female.    Vitals:  height is 5' 1" (1.549 m) and weight is 71.7 kg (158 lb). Her temperature is 97.3 °F (36.3 °C). Her blood pressure is 114/70 and her pulse is 89. Her respiration is 19 and oxygen saturation is 98%.     Chief Complaint: Cough    Cough   This is a new problem. The current episode started yesterday. The problem has been gradually worsening. The problem occurs every few minutes. The cough is non-productive. Associated symptoms include headaches. Pertinent negatives include no chest pain, chills, ear pain, eye redness, fever, hemoptysis, myalgias, nasal congestion, postnasal drip, rash, sore throat, shortness of breath, sweats or wheezing. The symptoms are aggravated by cold air. She has tried OTC cough suppressant for the symptoms. The treatment provided mild relief. Her past medical history is significant for pneumonia.     Review of Systems   Constitution: Negative for chills, fever and malaise/fatigue.   HENT: Positive for congestion. Negative for ear pain, hoarse voice, postnasal drip and sore throat.    Eyes: Negative for discharge and redness.   Cardiovascular: Negative for chest pain, dyspnea on exertion and leg swelling.   Respiratory: Positive for cough. Negative for hemoptysis, shortness of breath, sputum production and wheezing.    Skin: Negative for rash.   Musculoskeletal: Negative for myalgias.   Gastrointestinal: Positive for nausea. Negative for abdominal pain, diarrhea and vomiting.   Neurological: Positive for headaches.       Objective:      Physical Exam   Constitutional: She is oriented to person, place, and time. Vital signs are normal. She appears well-developed and well-nourished. She does not appear ill. No distress.   HENT:   Head: Normocephalic and atraumatic.   Right Ear: Hearing, tympanic membrane, external ear and ear canal normal.   Left Ear: Hearing, tympanic membrane, external ear and ear canal normal.   Nose: " Nose normal. No mucosal edema. Right sinus exhibits no maxillary sinus tenderness and no frontal sinus tenderness. Left sinus exhibits no maxillary sinus tenderness and no frontal sinus tenderness.   Mouth/Throat: Uvula is midline and oropharynx is clear and moist. No oropharyngeal exudate, posterior oropharyngeal edema or posterior oropharyngeal erythema.   Eyes: Conjunctivae, EOM and lids are normal. Right eye exhibits no discharge. Left eye exhibits no discharge.   Neck: Normal range of motion. Neck supple.   Cardiovascular: Normal rate, regular rhythm and normal heart sounds.  Exam reveals no gallop and no friction rub.    No murmur heard.  Pulmonary/Chest: Effort normal. No respiratory distress. She has no decreased breath sounds. She has wheezes (mild; diffuse). She has no rhonchi. She has no rales.   Musculoskeletal: Normal range of motion.   Lymphadenopathy:        Head (right side): No submandibular and no tonsillar adenopathy present.        Head (left side): No submandibular and no tonsillar adenopathy present.   Neurological: She is alert and oriented to person, place, and time.   Skin: Skin is warm and dry. No rash noted. No erythema.   Psychiatric: She has a normal mood and affect. Her behavior is normal.   Nursing note and vitals reviewed.      Assessment:       1. Acute bronchitis, unspecified organism        Plan:         Acute bronchitis, unspecified organism  -     benzonatate (TESSALON PERLES) 100 MG capsule; Take 1 capsule (100 mg total) by mouth 3 (three) times daily as needed for Cough.  Dispense: 30 capsule; Refill: 0  -     Discontinue: dexamethasone injection 7 mg; Inject 0.7 mLs (7 mg total) into the muscle one time.  -     dexamethasone injection 5 mg; Inject 0.5 mLs (5 mg total) into the muscle one time.      Patient Instructions   -Continue using albuterol inhaler for wheezing/cough.  -Take tessalon perles as needed for cough.  -Be aware that the steroid shot can cause an increase in  anxiety for the next 2-3 days.  -Please follow up with your primary care provider.      Please follow up with your primary care provider within 2-5 days if your signs and symptoms have not resolved or worsen.     If your condition worsens or fails to improve we recommend that you receive another evaluation at the emergency room immediately or contact your primary medical clinic to discuss your concerns.   You must understand that you have received an Urgent Care treatment only and that you may be released before all of your medical problems are known or treated. You, the patient, will arrange for follow up care as instructed.         Bronchitis with Wheezing (Viral or Bacterial: Adult)    Bronchitis is an infection of the air passages. It often occurs during a cold and is usually caused by a virus. Symptoms include cough with mucus (phlegm) and low-grade fever. This illness is contagious during the first few days and is spread through the air by coughing and sneezing, or by direct contact (touching the sick person and then touching your own eyes, nose, or mouth).  If there is a lot of inflammation, air flow is restricted. The air passages may also go into spasm, especially if you have asthma. This causes wheezing and difficulty breathing even in people who do not have asthma.  Bronchitis usually lasts 7 to 14 days. The wheezing should improve with treatment during the first week. An inhaler is often prescribed to relax the air passages and stop wheezing. Antibiotics will be prescribed if your doctor thinks there is also a secondary bacterial infection.  Home care  · If symptoms are severe, rest at home for the first 2 to 3 days. When you go back to your usual activities, don't let yourself get too tired.  · Do not smoke. Also avoid being exposed to secondhand smoke.  · You may use over-the-counter medicine to control fever or pain, unless another medicine was prescribed. Note: If you have chronic liver or kidney  disease or have ever had a stomach ulcer or gastrointestinal bleeding, talk with your healthcare provider before using these medicines. Also talk to your provider if you are taking medicine to prevent blood clots.) Aspirin should never be given to anyone younger than 18 years of age who is ill with a viral infection or fever. It may cause severe liver or brain damage.  · Your appetite may be poor, so a light diet is fine. Avoid dehydration by drinking 6 to 8 glasses of fluids per day (such as water, soft drinks, sports drinks, juices, tea, or soup). Extra fluids will help loosen secretions in the nose and lungs.  · Over-the-counter cough, cold, and sore-throat medicines will not shorten the length of the illness, but they may be helpful to reduce symptoms. (Note: Do not use decongestants if you have high blood pressure.)  · If you were given an inhaler, use it exactly as directed. If you need to use it more often than prescribed, your condition may be worsening. If this happens, contact your healthcare provider.  · If prescribed, finish all antibiotic medicine, even if you are feeling better after only a few days.  Follow-up care  Follow up with your healthcare provider, or as advised. If you had an X-ray or ECG (electrocardiogram), a specialist will review it. You will be notified of any new findings that may affect your care.  Note: If you are age 65 or older, or if you have a chronic lung disease or condition that affects your immune system, or you smoke, talk to your healthcare provider about having a pneumococcal vaccinations and a yearly influenza vaccination (flu shot).  When to seek medical advice  Call your healthcare provider right away if any of these occur:  · Fever of 100.4°F (38°C) or higher  · Coughing up increasing amounts of colored sputum  · Weakness, drowsiness, headache, facial pain, ear pain, or a stiff neck  Call 911, or get immediate medical care  Contact emergency services right away if any  of these occur.  · Coughing up blood  · Worsening weakness, drowsiness, headache, or stiff neck  · Increased wheezing not helped with medication, shortness of breath, or pain with breathing  Date Last Reviewed: 9/13/2015  © 0042-4737 [employer]. [address] [place] [address], [place] [address]. All rights reserved. This information is not intended as a substitute for professional medical care. Always follow your healthcare professional's instructions.

## 2017-12-27 NOTE — PATIENT INSTRUCTIONS
-Continue using albuterol inhaler for wheezing/cough.  -Take tessalon perles as needed for cough.  -Be aware that the steroid shot can cause an increase in anxiety for the next 2-3 days.  -Please follow up with your primary care provider.      Please follow up with your primary care provider within 2-5 days if your signs and symptoms have not resolved or worsen.     If your condition worsens or fails to improve we recommend that you receive another evaluation at the emergency room immediately or contact your primary medical clinic to discuss your concerns.   You must understand that you have received an Urgent Care treatment only and that you may be released before all of your medical problems are known or treated. You, the patient, will arrange for follow up care as instructed.         Bronchitis with Wheezing (Viral or Bacterial: Adult)    Bronchitis is an infection of the air passages. It often occurs during a cold and is usually caused by a virus. Symptoms include cough with mucus (phlegm) and low-grade fever. This illness is contagious during the first few days and is spread through the air by coughing and sneezing, or by direct contact (touching the sick person and then touching your own eyes, nose, or mouth).  If there is a lot of inflammation, air flow is restricted. The air passages may also go into spasm, especially if you have asthma. This causes wheezing and difficulty breathing even in people who do not have asthma.  Bronchitis usually lasts 7 to 14 days. The wheezing should improve with treatment during the first week. An inhaler is often prescribed to relax the air passages and stop wheezing. Antibiotics will be prescribed if your doctor thinks there is also a secondary bacterial infection.  Home care  · If symptoms are severe, rest at home for the first 2 to 3 days. When you go back to your usual activities, don't let yourself get too tired.  · Do not smoke. Also avoid being exposed to secondhand  smoke.  · You may use over-the-counter medicine to control fever or pain, unless another medicine was prescribed. Note: If you have chronic liver or kidney disease or have ever had a stomach ulcer or gastrointestinal bleeding, talk with your healthcare provider before using these medicines. Also talk to your provider if you are taking medicine to prevent blood clots.) Aspirin should never be given to anyone younger than 18 years of age who is ill with a viral infection or fever. It may cause severe liver or brain damage.  · Your appetite may be poor, so a light diet is fine. Avoid dehydration by drinking 6 to 8 glasses of fluids per day (such as water, soft drinks, sports drinks, juices, tea, or soup). Extra fluids will help loosen secretions in the nose and lungs.  · Over-the-counter cough, cold, and sore-throat medicines will not shorten the length of the illness, but they may be helpful to reduce symptoms. (Note: Do not use decongestants if you have high blood pressure.)  · If you were given an inhaler, use it exactly as directed. If you need to use it more often than prescribed, your condition may be worsening. If this happens, contact your healthcare provider.  · If prescribed, finish all antibiotic medicine, even if you are feeling better after only a few days.  Follow-up care  Follow up with your healthcare provider, or as advised. If you had an X-ray or ECG (electrocardiogram), a specialist will review it. You will be notified of any new findings that may affect your care.  Note: If you are age 65 or older, or if you have a chronic lung disease or condition that affects your immune system, or you smoke, talk to your healthcare provider about having a pneumococcal vaccinations and a yearly influenza vaccination (flu shot).  When to seek medical advice  Call your healthcare provider right away if any of these occur:  · Fever of 100.4°F (38°C) or higher  · Coughing up increasing amounts of colored  sputum  · Weakness, drowsiness, headache, facial pain, ear pain, or a stiff neck  Call 911, or get immediate medical care  Contact emergency services right away if any of these occur.  · Coughing up blood  · Worsening weakness, drowsiness, headache, or stiff neck  · Increased wheezing not helped with medication, shortness of breath, or pain with breathing  Date Last Reviewed: 9/13/2015  © 4711-6592 [employer], [employer]. [address] [place] [address], [place] [address]. All rights reserved. This information is not intended as a substitute for professional medical care. Always follow your healthcare professional's instructions.

## 2017-12-30 ENCOUNTER — OFFICE VISIT (OUTPATIENT)
Dept: URGENT CARE | Facility: CLINIC | Age: 47
End: 2017-12-30
Payer: MEDICAID

## 2017-12-30 VITALS
DIASTOLIC BLOOD PRESSURE: 82 MMHG | RESPIRATION RATE: 18 BRPM | OXYGEN SATURATION: 98 % | TEMPERATURE: 98 F | HEIGHT: 61 IN | SYSTOLIC BLOOD PRESSURE: 137 MMHG | BODY MASS INDEX: 29.27 KG/M2 | HEART RATE: 90 BPM | WEIGHT: 155 LBS

## 2017-12-30 DIAGNOSIS — J10.1 INFLUENZA A: Primary | ICD-10-CM

## 2017-12-30 DIAGNOSIS — R52 BODY ACHES: ICD-10-CM

## 2017-12-30 LAB
CTP QC/QA: YES
FLUAV AG NPH QL: POSITIVE
FLUBV AG NPH QL: NEGATIVE

## 2017-12-30 PROCEDURE — 87804 INFLUENZA ASSAY W/OPTIC: CPT | Mod: 59,QW,S$GLB, | Performed by: PHYSICIAN ASSISTANT

## 2017-12-30 PROCEDURE — 99214 OFFICE O/P EST MOD 30 MIN: CPT | Mod: S$GLB,,, | Performed by: PHYSICIAN ASSISTANT

## 2017-12-30 RX ORDER — ONDANSETRON HYDROCHLORIDE 8 MG/1
8 TABLET, FILM COATED ORAL EVERY 8 HOURS PRN
Qty: 15 TABLET | Refills: 0 | Status: SHIPPED | OUTPATIENT
Start: 2017-12-30

## 2017-12-30 RX ORDER — OSELTAMIVIR PHOSPHATE 75 MG/1
75 CAPSULE ORAL 2 TIMES DAILY
Qty: 10 CAPSULE | Refills: 0 | Status: SHIPPED | OUTPATIENT
Start: 2017-12-30 | End: 2018-01-04

## 2017-12-30 NOTE — PROGRESS NOTES
"Subjective:       Patient ID: Alina Menchaca is a 47 y.o. female.    Vitals:  height is 5' 1" (1.549 m) and weight is 70.3 kg (155 lb). Her oral temperature is 97.5 °F (36.4 °C). Her blood pressure is 137/82 and her pulse is 90. Her respiration is 18 and oxygen saturation is 98%.     Chief Complaint: Cough and Diarrhea    Cough   This is a new problem. The current episode started yesterday. The problem occurs constantly. The cough is productive of sputum. Associated symptoms include ear pain (right ear), headaches and a sore throat. Pertinent negatives include no chest pain, chills, eye redness, fever, myalgias, shortness of breath or wheezing. Nothing aggravates the symptoms. Risk factors for lung disease include smoking/tobacco exposure. Treatments tried: mucinex. The treatment provided mild relief. Her past medical history is significant for bronchitis.   Diarrhea    Associated symptoms include coughing and headaches. Pertinent negatives include no abdominal pain, chills, fever or myalgias.     Review of Systems   Constitution: Negative for chills, fever and malaise/fatigue.   HENT: Positive for congestion, ear pain (right ear), hoarse voice and sore throat.    Eyes: Negative for discharge and redness.   Cardiovascular: Negative for chest pain, dyspnea on exertion and leg swelling.   Respiratory: Positive for cough and sputum production. Negative for shortness of breath and wheezing.    Musculoskeletal: Negative for myalgias.   Gastrointestinal: Positive for diarrhea. Negative for abdominal pain and nausea.   Neurological: Positive for headaches.       Objective:      Physical Exam   Constitutional: She is oriented to person, place, and time. Vital signs are normal. She appears well-developed and well-nourished. She appears ill.   HENT:   Head: Normocephalic and atraumatic.   Right Ear: Hearing, tympanic membrane, external ear and ear canal normal.   Left Ear: Hearing, tympanic membrane, external ear and ear " canal normal.   Nose: Mucosal edema present. Right sinus exhibits no maxillary sinus tenderness and no frontal sinus tenderness. Left sinus exhibits no maxillary sinus tenderness and no frontal sinus tenderness.   Mouth/Throat: Uvula is midline. Posterior oropharyngeal erythema present. No oropharyngeal exudate or posterior oropharyngeal edema.   Eyes: Conjunctivae, EOM and lids are normal. Right eye exhibits no discharge. Left eye exhibits no discharge.   Neck: Normal range of motion. Neck supple.   Cardiovascular: Normal rate, regular rhythm and normal heart sounds.  Exam reveals no gallop and no friction rub.    No murmur heard.  Pulmonary/Chest: Effort normal. No respiratory distress. She has no decreased breath sounds. She has wheezes (diffuse). She has no rhonchi. She has no rales.   Musculoskeletal: Normal range of motion.   Lymphadenopathy:        Head (right side): No submandibular and no tonsillar adenopathy present.        Head (left side): No submandibular and no tonsillar adenopathy present.   Neurological: She is alert and oriented to person, place, and time.   Skin: Skin is warm and dry. No rash noted. No erythema.   Psychiatric: She has a normal mood and affect. Her behavior is normal.   Nursing note and vitals reviewed.      Assessment:       1. Influenza A    2. Body aches        Office Visit on 12/30/2017   Component Date Value Ref Range Status    Rapid Influenza A Ag 12/30/2017 Positive* Negative Final    Rapid Influenza B Ag 12/30/2017 Negative  Negative Final     Acceptable 12/30/2017 Yes   Final     Plan:       Patient states she already had breathing treatments and inhalers at home.   Influenza A  -     oseltamivir (TAMIFLU) 75 MG capsule; Take 1 capsule (75 mg total) by mouth 2 (two) times daily.  Dispense: 10 capsule; Refill: 0  -     ondansetron (ZOFRAN) 8 MG tablet; Take 1 tablet (8 mg total) by mouth every 8 (eight) hours as needed for Nausea.  Dispense: 15 tablet;  Refill: 0    Body aches  -     POCT Influenza A/B      Patient Instructions     -Take tamiflu twice daily for 5 days.  -Take tylenol/motrin as needed for pain/fever.    Please follow up with your primary care provider within 2-5 days if your signs and symptoms have not resolved or worsen.     If your condition worsens or fails to improve we recommend that you receive another evaluation at the emergency room immediately or contact your primary medical clinic to discuss your concerns.   You must understand that you have received an Urgent Care treatment only and that you may be released before all of your medical problems are known or treated. You, the patient, will arrange for follow up care as instructed.         The Flu (Influenza)     The virus that causes the flu spreads through the air in droplets when someone who has the flu coughs, sneezes, laughs, or talks.   The flu (influenza) is an infection that affects your respiratory tract. This tract is made up of your mouth, nose, and lungs, and the passages between them. Unlike a cold, the flu can make you very ill. And it can lead to pneumonia, a serious lung infection. The flu can have serious complications and even cause death.  Who is at risk for the flu?  Anyone can get the flu. But you are more likely to become infected if you:  · Have a weakened immune system  · Work in a healthcare setting where you may be exposed to flu germs  · Live or work with someone who has the flu  · Havent had an annual flu shot  How does the flu spread?  The flu is caused by a virus. The virus spreads through the air in droplets when someone who has the flu coughs, sneezes, laughs, or talks. You can become infected when you inhale these viruses directly. You can also become infected when you touch a surface on which the droplets have landed and then transfer the germs to your eyes, nose, or mouth. Touching used tissues, or sharing utensils, drinking glasses, or a toothbrush from an  infected person can expose you to flu viruses, too.  What are the symptoms of the flu?  Flu symptoms tend to come on quickly and may last a few days to a few weeks. They include:  · Fever usually higher than 100.4°F  (38°C) and chills  · Sore throat and headache  · Dry cough  · Runny nose  · Tiredness and weakness  · Muscle aches  Who is at risk for flu complications?  For some people, the flu can be very serious. The risk for complications is greater for:  · Children younger than age 5  · Adults ages 65 and older  · People with a chronic illness such as diabetes or heart, kidney, or lung disease  · People who live in a nursing home or long-term care facility   How is the flu treated?  The flu usually gets better after 7 days or so. In some cases, your healthcare provider may prescribe an antiviral medicine. This may help you get well a little sooner. For the medicine to help, you need to take it as soon as possible (ideally within 48 hours) after your symptoms start. If you develop pneumonia or other serious illness, you may need to stay in the hospital.  Easing flu symptoms  · Drink lots of fluids such as water, juice, and warm soup. A good rule is to drink enough so that you urinate your normal amount.  · Get plenty of rest.  · Ask your healthcare provider what to take for fever and pain.  · Call your provider if your fever is 100.4°F (38°C) or higher, or you become dizzy, lightheaded, or short of breath.  Taking steps to protect others  · Wash your hands often, especially after coughing or sneezing. Or clean your hands with an alcohol-based hand  containing at least 60% alcohol.  · Cough or sneeze into a tissue. Then throw the tissue away and wash your hands. If you dont have a tissue, cough and sneeze into your elbow.  · Stay home until at least 24 hours after you no longer have a fever or chills. Be sure the fever isnt being hidden by fever-reducing medicine.  · Dont share food, utensils, drinking  glasses, or a toothbrush with others.  · Ask your healthcare provider if others in your household should get antiviral medicine to help them avoid infection.  How can the flu be prevented?  · One of the best ways to avoid the flu is to get a flu vaccine each year. The virus that causes the flu changes from year to year. For that reason, healthcare providers recommend getting the flu vaccine each year, as soon as it's available in your area. The vaccine is given as a shot. Your healthcare provider can tell you which vaccine is right for you. A nasal spray is also available but is not recommended for the 0492-7562 flu season. The CDC says this is because the nasal spray did not seem to protect against the flu over the last several flu seasons. In the past, it was meant for people ages 2 to 49.  · Wash your hands often. Frequent handwashing is a proven way to help prevent infection.  · Carry an alcohol-based hand gel containing at least 60% alcohol. Use it when you can't use soap and water. Then wash your hands as soon as you can.  · Avoid touching your eyes, nose, and mouth.  · At home and work, clean phones, computer keyboards, and toys often with disinfectant wipes.  · If possible, avoid close contact with others who have the flu or symptoms of the flu.  Handwashing tips  Handwashing is one of the best ways to prevent many common infections. If you are caring for or visiting someone with the flu, wash your hands each time you enter and leave the room. Follow these steps:  · Use warm water and plenty of soap. Rub your hands together well.  · Clean the whole hand, including under your nails, between your fingers, and up the wrists.  · Wash for at least 15 seconds.  · Rinse, letting the water run down your fingers, not up your wrists.  · Dry your hands well. Use a paper towel to turn off the faucet and open the door.  Using alcohol-based hand   Alcohol-based hand  are also a good choice. Use them when  you can't use soap and water. Follow these steps:  · Squeeze about a tablespoon of gel into the palm of one hand.  · Rub your hands together briskly, cleaning the backs of your hands, the palms, between your fingers, and up the wrists.  · Rub until the gel is gone and your hands are completely dry.  Preventing the flu in healthcare settings  The flu is a special concern for people in hospitals and long-term care facilities. To help prevent the spread of flu, many hospitals and nursing homes take these steps:  · Healthcare providers wash their hands or use an alcohol-based hand  before and after treating each patient.  · People with the flu have private rooms and bathrooms or share a room with someone with the same infection.  · People who are at high risk for the flu but don't have it are encouraged to get the flu and pneumonia vaccines.  · All healthcare workers are encouraged or required to get flu shots.   Date Last Reviewed: 12/1/2016  © 1518-7074 [employer]. [address] [place] [address], [place] [address]. All rights reserved. This information is not intended as a substitute for professional medical care. Always follow your healthcare professional's instructions.

## 2017-12-30 NOTE — PATIENT INSTRUCTIONS
-Take tamiflu twice daily for 5 days.  -Take tylenol/motrin as needed for pain/fever.    Please follow up with your primary care provider within 2-5 days if your signs and symptoms have not resolved or worsen.     If your condition worsens or fails to improve we recommend that you receive another evaluation at the emergency room immediately or contact your primary medical clinic to discuss your concerns.   You must understand that you have received an Urgent Care treatment only and that you may be released before all of your medical problems are known or treated. You, the patient, will arrange for follow up care as instructed.         The Flu (Influenza)     The virus that causes the flu spreads through the air in droplets when someone who has the flu coughs, sneezes, laughs, or talks.   The flu (influenza) is an infection that affects your respiratory tract. This tract is made up of your mouth, nose, and lungs, and the passages between them. Unlike a cold, the flu can make you very ill. And it can lead to pneumonia, a serious lung infection. The flu can have serious complications and even cause death.  Who is at risk for the flu?  Anyone can get the flu. But you are more likely to become infected if you:  · Have a weakened immune system  · Work in a healthcare setting where you may be exposed to flu germs  · Live or work with someone who has the flu  · Havent had an annual flu shot  How does the flu spread?  The flu is caused by a virus. The virus spreads through the air in droplets when someone who has the flu coughs, sneezes, laughs, or talks. You can become infected when you inhale these viruses directly. You can also become infected when you touch a surface on which the droplets have landed and then transfer the germs to your eyes, nose, or mouth. Touching used tissues, or sharing utensils, drinking glasses, or a toothbrush from an infected person can expose you to flu viruses, too.  What are the symptoms of  the flu?  Flu symptoms tend to come on quickly and may last a few days to a few weeks. They include:  · Fever usually higher than 100.4°F  (38°C) and chills  · Sore throat and headache  · Dry cough  · Runny nose  · Tiredness and weakness  · Muscle aches  Who is at risk for flu complications?  For some people, the flu can be very serious. The risk for complications is greater for:  · Children younger than age 5  · Adults ages 65 and older  · People with a chronic illness such as diabetes or heart, kidney, or lung disease  · People who live in a nursing home or long-term care facility   How is the flu treated?  The flu usually gets better after 7 days or so. In some cases, your healthcare provider may prescribe an antiviral medicine. This may help you get well a little sooner. For the medicine to help, you need to take it as soon as possible (ideally within 48 hours) after your symptoms start. If you develop pneumonia or other serious illness, you may need to stay in the hospital.  Easing flu symptoms  · Drink lots of fluids such as water, juice, and warm soup. A good rule is to drink enough so that you urinate your normal amount.  · Get plenty of rest.  · Ask your healthcare provider what to take for fever and pain.  · Call your provider if your fever is 100.4°F (38°C) or higher, or you become dizzy, lightheaded, or short of breath.  Taking steps to protect others  · Wash your hands often, especially after coughing or sneezing. Or clean your hands with an alcohol-based hand  containing at least 60% alcohol.  · Cough or sneeze into a tissue. Then throw the tissue away and wash your hands. If you dont have a tissue, cough and sneeze into your elbow.  · Stay home until at least 24 hours after you no longer have a fever or chills. Be sure the fever isnt being hidden by fever-reducing medicine.  · Dont share food, utensils, drinking glasses, or a toothbrush with others.  · Ask your healthcare provider if others  in your household should get antiviral medicine to help them avoid infection.  How can the flu be prevented?  · One of the best ways to avoid the flu is to get a flu vaccine each year. The virus that causes the flu changes from year to year. For that reason, healthcare providers recommend getting the flu vaccine each year, as soon as it's available in your area. The vaccine is given as a shot. Your healthcare provider can tell you which vaccine is right for you. A nasal spray is also available but is not recommended for the 3941-8941 flu season. The CDC says this is because the nasal spray did not seem to protect against the flu over the last several flu seasons. In the past, it was meant for people ages 2 to 49.  · Wash your hands often. Frequent handwashing is a proven way to help prevent infection.  · Carry an alcohol-based hand gel containing at least 60% alcohol. Use it when you can't use soap and water. Then wash your hands as soon as you can.  · Avoid touching your eyes, nose, and mouth.  · At home and work, clean phones, computer keyboards, and toys often with disinfectant wipes.  · If possible, avoid close contact with others who have the flu or symptoms of the flu.  Handwashing tips  Handwashing is one of the best ways to prevent many common infections. If you are caring for or visiting someone with the flu, wash your hands each time you enter and leave the room. Follow these steps:  · Use warm water and plenty of soap. Rub your hands together well.  · Clean the whole hand, including under your nails, between your fingers, and up the wrists.  · Wash for at least 15 seconds.  · Rinse, letting the water run down your fingers, not up your wrists.  · Dry your hands well. Use a paper towel to turn off the faucet and open the door.  Using alcohol-based hand   Alcohol-based hand  are also a good choice. Use them when you can't use soap and water. Follow these steps:  · Squeeze about a tablespoon  of gel into the palm of one hand.  · Rub your hands together briskly, cleaning the backs of your hands, the palms, between your fingers, and up the wrists.  · Rub until the gel is gone and your hands are completely dry.  Preventing the flu in healthcare settings  The flu is a special concern for people in hospitals and long-term care facilities. To help prevent the spread of flu, many hospitals and nursing homes take these steps:  · Healthcare providers wash their hands or use an alcohol-based hand  before and after treating each patient.  · People with the flu have private rooms and bathrooms or share a room with someone with the same infection.  · People who are at high risk for the flu but don't have it are encouraged to get the flu and pneumonia vaccines.  · All healthcare workers are encouraged or required to get flu shots.   Date Last Reviewed: 12/1/2016  © 6304-9372 [employer]. [address] [place] [address], [place] [address]. All rights reserved. This information is not intended as a substitute for professional medical care. Always follow your healthcare professional's instructions.

## 2018-04-11 ENCOUNTER — CLINICAL SUPPORT (OUTPATIENT)
Dept: SMOKING CESSATION | Facility: CLINIC | Age: 48
End: 2018-04-11
Payer: COMMERCIAL

## 2018-04-11 DIAGNOSIS — F17.200 NICOTINE DEPENDENCE: Primary | ICD-10-CM

## 2018-04-11 PROCEDURE — 99407 BEHAV CHNG SMOKING > 10 MIN: CPT | Mod: S$GLB,,,

## 2018-04-26 ENCOUNTER — TELEPHONE (OUTPATIENT)
Dept: SMOKING CESSATION | Facility: CLINIC | Age: 48
End: 2018-04-26

## 2018-04-26 NOTE — TELEPHONE ENCOUNTER
[facility]- called to check on patient, no show for intake appointment. Patient said she will call when she is ready to reschedule

## 2019-07-07 ENCOUNTER — HOSPITAL ENCOUNTER (EMERGENCY)
Facility: HOSPITAL | Age: 49
Discharge: HOME | End: 2019-07-07
Attending: EMERGENCY MEDICINE
Payer: MEDICAID

## 2019-07-07 VITALS
OXYGEN SATURATION: 96 % | BODY MASS INDEX: 27.88 KG/M2 | HEART RATE: 72 BPM | RESPIRATION RATE: 20 BRPM | WEIGHT: 142 LBS | SYSTOLIC BLOOD PRESSURE: 129 MMHG | HEIGHT: 60 IN | TEMPERATURE: 98 F | DIASTOLIC BLOOD PRESSURE: 76 MMHG

## 2019-07-07 DIAGNOSIS — L28.2 PRURITIC RASH: Primary | ICD-10-CM

## 2019-07-07 PROCEDURE — 25000003 PHARM REV CODE 250: Performed by: PHYSICIAN ASSISTANT

## 2019-07-07 PROCEDURE — 99284 EMERGENCY DEPT VISIT MOD MDM: CPT | Mod: 25

## 2019-07-07 PROCEDURE — 63600175 PHARM REV CODE 636 W HCPCS: Performed by: PHYSICIAN ASSISTANT

## 2019-07-07 PROCEDURE — 96372 THER/PROPH/DIAG INJ SC/IM: CPT

## 2019-07-07 RX ORDER — FAMOTIDINE 20 MG/1
20 TABLET, FILM COATED ORAL 2 TIMES DAILY
Qty: 14 TABLET | Refills: 0 | Status: SHIPPED | OUTPATIENT
Start: 2019-07-07 | End: 2020-05-12

## 2019-07-07 RX ORDER — FAMOTIDINE 20 MG/1
20 TABLET, FILM COATED ORAL
Status: COMPLETED | OUTPATIENT
Start: 2019-07-07 | End: 2019-07-07

## 2019-07-07 RX ORDER — DEXAMETHASONE SODIUM PHOSPHATE 4 MG/ML
8 INJECTION, SOLUTION INTRA-ARTICULAR; INTRALESIONAL; INTRAMUSCULAR; INTRAVENOUS; SOFT TISSUE
Status: COMPLETED | OUTPATIENT
Start: 2019-07-07 | End: 2019-07-07

## 2019-07-07 RX ORDER — CEPHALEXIN 500 MG/1
500 CAPSULE ORAL
Status: COMPLETED | OUTPATIENT
Start: 2019-07-07 | End: 2019-07-07

## 2019-07-07 RX ORDER — CEPHALEXIN 500 MG/1
500 CAPSULE ORAL 4 TIMES DAILY
Qty: 20 CAPSULE | Refills: 0 | Status: SHIPPED | OUTPATIENT
Start: 2019-07-07 | End: 2019-07-12

## 2019-07-07 RX ORDER — PREDNISONE 20 MG/1
60 TABLET ORAL DAILY
Qty: 15 TABLET | Refills: 0 | Status: SHIPPED | OUTPATIENT
Start: 2019-07-07 | End: 2019-07-12

## 2019-07-07 RX ORDER — DIPHENHYDRAMINE HCL 25 MG
25 CAPSULE ORAL EVERY 6 HOURS PRN
Qty: 20 CAPSULE | Refills: 0 | Status: SHIPPED | OUTPATIENT
Start: 2019-07-07 | End: 2019-07-12

## 2019-07-07 RX ORDER — CETIRIZINE HYDROCHLORIDE 10 MG/1
10 TABLET ORAL DAILY
Qty: 12 TABLET | Refills: 0 | Status: SHIPPED | OUTPATIENT
Start: 2019-07-07 | End: 2020-05-28

## 2019-07-07 RX ADMIN — CEPHALEXIN 500 MG: 500 CAPSULE ORAL at 12:07

## 2019-07-07 RX ADMIN — DEXAMETHASONE SODIUM PHOSPHATE 8 MG: 4 INJECTION, SOLUTION INTRAMUSCULAR; INTRAVENOUS at 12:07

## 2019-07-07 RX ADMIN — FAMOTIDINE 20 MG: 20 TABLET ORAL at 12:07

## 2019-07-07 NOTE — DISCHARGE INSTRUCTIONS
Take medications as prescribed for allergic rash.     Follow up with primary care in 2 days and dermatology for persisting symptoms. Return to ER for fever, worsening pain, redness, swelling, purulent drainage or as needed.

## 2019-07-07 NOTE — ED TRIAGE NOTES
Patient states she cut her mothers grass on yesterday and started itching on last night. Took Benadryl yesterday with minimal relief. Denies any feelings of swelling to her throat.

## 2019-07-07 NOTE — ED NOTES
Patient discharged to home in stable condition with prescriptions x 5.  Patient exhibits no s/s of adverse reactions to injection or voices no complaints at this time.

## 2019-07-07 NOTE — ED PROVIDER NOTES
Encounter Date: 7/7/2019    SCRIBE #1 NOTE: IJolene, am scribing for, and in the presence of,  Mouna Ma PA-C. I have scribed the following portions of the note - Other sections scribed: HPI.       History     Chief Complaint   Patient presents with    Rash     red rash to neck, upper chest, and bilateral shoulders. took 50mg benadryl at 7:30 this morning and tried calamine lotion with no relief.      This is a 49 y.o female presenting to the Emergency Department with a cc of a rash that started yesterday morning. She reports a pruritic red rash to her chest, neck and shoulders. She reports sensation of throat swelling. She reports she has developed pain and small wounds to her bilateral forearms due to scratching. Denies purulent drainage. She states she is allergic to grass and that she was cutting grass and pulling weeds from the ground yesterday before the symptoms started but nothing like this has ever happened before. No contacts with similar rash. She took 50mg benadryl at 7:30 this morning and tried calamine lotion with no relief. She denies using any new soaps or detergents. She says she is sneezing and coughing but denies SOB, N/V/D, fever and chills. She took a Benadryl but had no alleviating symptoms. No prior treatments. No alleviating factors.     The history is provided by the patient. No  was used.     Review of patient's allergies indicates:   Allergen Reactions    Grass pollen-june grass standard Itching     Past Medical History:   Diagnosis Date    Asthma     Bipolar 1 disorder     Manic depression     Seizures      Past Surgical History:   Procedure Laterality Date    APPENDECTOMY      CARPAL TUNNEL RELEASE      HERNIA REPAIR      HYSTERECTOMY       Family History   Problem Relation Age of Onset    Schizophrenia Mother     Diabetes Father     Heart disease Father      Social History     Tobacco Use    Smoking status: Current Every Day  Smoker     Packs/day: 1.00     Years: 30.00     Pack years: 30.00     Types: Cigarettes    Smokeless tobacco: Never Used   Substance Use Topics    Alcohol use: No    Drug use: Yes     Types: Marijuana     Review of Systems   Constitutional: Negative for chills and fever.   HENT: Positive for congestion and rhinorrhea. Negative for ear pain, sore throat and trouble swallowing.    Eyes: Negative for redness.   Respiratory: Negative for shortness of breath.    Gastrointestinal: Negative for abdominal pain, diarrhea, nausea and vomiting.   Musculoskeletal: Negative for back pain, myalgias and neck pain.   Skin: Positive for rash and wound.   Neurological: Negative for dizziness, speech difficulty, light-headedness and headaches.   Psychiatric/Behavioral: Negative for confusion.       Physical Exam     Initial Vitals [07/07/19 1151]   BP Pulse Resp Temp SpO2   126/82 87 20 98.5 °F (36.9 °C) 95 %      MAP       --         Physical Exam    Nursing note and vitals reviewed.  Constitutional: She appears well-developed and well-nourished.   HENT:   Head: Normocephalic.   Right Ear: Hearing, tympanic membrane, external ear and ear canal normal.   Left Ear: Hearing, tympanic membrane, external ear and ear canal normal.   Nose: Nose normal.   Mouth/Throat: Uvula is midline, oropharynx is clear and moist and mucous membranes are normal. No oropharyngeal exudate, posterior oropharyngeal edema or posterior oropharyngeal erythema.   Postnasal drip      Eyes: Conjunctivae are normal.   Cardiovascular: Normal rate and regular rhythm. Exam reveals no gallop and no friction rub.    No murmur heard.  Pulmonary/Chest: Breath sounds normal. She has no wheezes. She has no rhonchi. She has no rales.   Abdominal: Soft. Bowel sounds are normal. She exhibits no distension. There is no tenderness. There is no rebound, no guarding, no tenderness at McBurney's point and negative Domínguez's sign.   Musculoskeletal: Normal range of motion.    Lymphadenopathy:     She has no cervical adenopathy.   Neurological: She is alert. She has normal strength. No cranial nerve deficit or sensory deficit.   Skin: Skin is warm and dry. Rash noted.   Erythematous rash to chest.   Areas of excoriation to bilateral upper extremities with no purulent drainage. Tender to palpation. No vesicular lesions    Psychiatric: She has a normal mood and affect.          ED Course   Procedures  Labs Reviewed - No data to display       Imaging Results    None          Medical Decision Making:   Initial Assessment:   49-year-old female presenting for evaluation of pruritic rash after mowing grass and pulling weeds outside yesterday.  She states she is allergic rash. She reports sensation of throat swelling.  Denies any wheezing, shortness of breath, nausea vomiting, diarrhea abdominal pain patient hypotensive or tachycardic.  No oropharyngeal swelling.  There is postnasal drip and pt has had nasal congestion and rhinorrhea. Lungs clear.  No wheezing.  She is not respiratory distress.  Doubt anaphylaxis.  There are no vesicular lesions.  Does not appear to be poison ivy.  Think this is likely allergic reaction.  Patient does have areas of excoriation due to excessive scratching to bilateral forearms.  Will treat with Keflex to prevent infection and treat with prednisone and Benadryl for symptomatic treatment.  Primary care in Dermatology follow up in 1-2 days and return emergency department for fever, worsening pain or swelling, purulent drainage or as needed.            Scribe Attestation:   Scribe #1: I performed the above scribed service and the documentation accurately describes the services I performed. I attest to the accuracy of the note.    Attending Attestation:           Physician Attestation for Scribe:  Physician Attestation Statement for Scribe #1: I, Mouna Ma PA-C, reviewed documentation, as scribed by Jolene Zayas in my presence, and it is both  accurate and complete.                    Clinical Impression:       ICD-10-CM ICD-9-CM   1. Pruritic rash L28.2 698.2                                Mouna Ma PA-C  07/07/19 9967

## 2020-03-09 ENCOUNTER — TELEPHONE (OUTPATIENT)
Dept: ORTHOPEDICS | Facility: CLINIC | Age: 50
End: 2020-03-09

## 2020-03-09 DIAGNOSIS — M25.561 PAIN IN BOTH KNEES, UNSPECIFIED CHRONICITY: Primary | ICD-10-CM

## 2020-03-09 DIAGNOSIS — M25.562 PAIN IN BOTH KNEES, UNSPECIFIED CHRONICITY: Primary | ICD-10-CM

## 2020-03-10 ENCOUNTER — TELEPHONE (OUTPATIENT)
Dept: ORTHOPEDICS | Facility: CLINIC | Age: 50
End: 2020-03-10

## 2020-03-10 ENCOUNTER — OFFICE VISIT (OUTPATIENT)
Dept: ORTHOPEDICS | Facility: CLINIC | Age: 50
End: 2020-03-10
Payer: MEDICAID

## 2020-03-10 VITALS — HEIGHT: 60 IN | BODY MASS INDEX: 27.88 KG/M2 | WEIGHT: 142 LBS

## 2020-03-10 DIAGNOSIS — M17.11 PRIMARY OSTEOARTHRITIS OF RIGHT KNEE: Primary | ICD-10-CM

## 2020-03-10 PROCEDURE — 99204 PR OFFICE/OUTPT VISIT, NEW, LEVL IV, 45-59 MIN: ICD-10-PCS | Mod: S$PBB,,, | Performed by: ORTHOPAEDIC SURGERY

## 2020-03-10 PROCEDURE — 99999 PR PBB SHADOW E&M-EST. PATIENT-LVL III: CPT | Mod: PBBFAC,,, | Performed by: ORTHOPAEDIC SURGERY

## 2020-03-10 PROCEDURE — 99999 PR PBB SHADOW E&M-EST. PATIENT-LVL III: ICD-10-PCS | Mod: PBBFAC,,, | Performed by: ORTHOPAEDIC SURGERY

## 2020-03-10 PROCEDURE — 99204 OFFICE O/P NEW MOD 45 MIN: CPT | Mod: S$PBB,,, | Performed by: ORTHOPAEDIC SURGERY

## 2020-03-10 PROCEDURE — 99213 OFFICE O/P EST LOW 20 MIN: CPT | Mod: PBBFAC,PN | Performed by: ORTHOPAEDIC SURGERY

## 2020-03-10 NOTE — LETTER
March 10, 2020      Evan Hickman MD  [address]  [place] [address]           [facility]  [address]  [place] [address]  Phone: [phone]  Fax: [phone]          Patient: Alina Menchaca   MR Number: [id number]   YOB: 1970   Date of Visit: 3/10/2020       Dear Dr. Evan Hickman:    Thank you for referring Alina Menchaca to me for evaluation. Attached you will find relevant portions of my assessment and plan of care.    If you have questions, please do not hesitate to call me. I look forward to following Alina Menchaca along with you.    Sincerely,    Vasiliy Ivory MD    Enclosure  CC:  No Recipients    If you would like to receive this communication electronically, please contact [email or web address] or [phone] to request more information on [employer] Link access.    For providers and/or their staff who would like to refer a patient to [facility], please contact us through our one-stop-shop provider referral line, [facility], at [phone].    If you feel you have received this communication in error or would no longer like to receive these types of communications, please e-mail [email or web address]

## 2020-03-10 NOTE — PROGRESS NOTES
Subjective:      Patient ID: Alina Menchaca is a 49 y.o. female.    Chief Complaint: Pain and Swelling of the Right Knee    HPI     They have experienced problems with their right knee over the past 4 years. The patient reports relevant history of injury/aggravation.  She slipped while attending a parade.  Pain is located medially and  anteriorly Associated symptoms include swelling, giving way, pseudolocking and gelling.  Symptoms are aggravated by any prolonged walking. They have been treated with over the counter analgesics, NSAIDS, cane/walker, steroid injection(s) and activity modification.   Symptoms have recently worsened. Ambulation reportedly has been impaired. Self care ADLs are painful.     Review of Systems   Constitution: Negative for fever and weight loss.   HENT: Negative for congestion.    Eyes: Negative for visual disturbance.   Cardiovascular: Negative for chest pain.   Respiratory: Negative for shortness of breath.    Hematologic/Lymphatic: Negative for bleeding problem. Does not bruise/bleed easily.   Skin: Negative for poor wound healing.   Musculoskeletal: Positive for joint pain and joint swelling.   Gastrointestinal: Negative for abdominal pain.   Genitourinary: Negative for dysuria.   Neurological: Negative for seizures.   Psychiatric/Behavioral: Negative for altered mental status.   Allergic/Immunologic: Negative for persistent infections.         Objective:      Ortho/SPM Exam      Right knee    [unfilled]    The patient is not in acute distress.   Sclerae normal  Body habitus is normal.  Respiratory distress:  none   The patient walks with a limp.  Hip irritability  negative.   The skin over the knee is intact.  Knee effusion trace  Tendernes is located along the joint line  Range of motion- Flexion 110 deg, Extension 0 deg,   Ligament laxity exam:   MCL 1+   Lachman 0   Post sag  0    LCL 0  Patellar apprehension negative.  Popliteal cyst negative  Patellar crepitation  present.  Flexion/pinch positive  Pulses DP present, PT present.  Motor normal 5/5 strength in all tested muscle groups.   Sensory normal.    I reviewed the relevant imaging for the patient's condition:  Right knee films show severe medial narrowing narrowing with complete loss of joint space medially.  Left knee is noted to be incidentally similar    Assessment:       Encounter Diagnosis   Name Primary?    Primary osteoarthritis of right knee Yes          The condition is structurally advanced with severe pain and and limitations of ADLs.  Prolonged, extensive nonsurgical care has not been of meaningful benefit.  Plan:       Alina was seen today for pain and swelling.    Diagnoses and all orders for this visit:    Primary osteoarthritis of right knee        I explained my diagnostic impression and the reasoning behind it in detail, using layman's terms.  Models and/or pictures were used to help in the explanation.    Treatment options were discussed. The surgical process of right knee replacement was discussed in detail with the patient including a detailed discussion of the procedure itself (including visual model, x-ray review, and literature review). The typical perioperative and post-operative course was discussed and perioperative risks were discussed to the patient's satisfaction.  Risks and complications discussed included but were not limited to the risks of anesthetic complications, infection, bleeding, wound healing complications, stiffness, aseptic loosening, instability, limb length inequality, neurologic dysfunction including numbness and weakness, additional surgery,  DVT, pulmonary embolism, perioperative medical risks (cardiac, pulmonary, renal, neurologic), and death and the patient elects to proceed.

## 2020-03-11 DIAGNOSIS — M17.11 PRIMARY OSTEOARTHRITIS OF RIGHT KNEE: Primary | ICD-10-CM

## 2020-03-11 RX ORDER — NAPROXEN 250 MG/1
500 TABLET ORAL
Status: CANCELLED | OUTPATIENT
Start: 2020-03-11 | End: 2020-03-11

## 2020-03-11 RX ORDER — SODIUM CHLORIDE 0.9 % (FLUSH) 0.9 %
10 SYRINGE (ML) INJECTION
Status: CANCELLED | OUTPATIENT
Start: 2020-03-11

## 2020-03-11 RX ORDER — MUPIROCIN 20 MG/G
OINTMENT TOPICAL
Status: CANCELLED | OUTPATIENT
Start: 2020-03-11

## 2020-03-11 RX ORDER — ACETAMINOPHEN 325 MG/1
1000 TABLET ORAL
Status: CANCELLED | OUTPATIENT
Start: 2020-03-11 | End: 2020-03-11

## 2020-03-11 RX ORDER — PREGABALIN 50 MG/1
150 CAPSULE ORAL
Status: CANCELLED | OUTPATIENT
Start: 2020-03-11 | End: 2020-03-11

## 2020-03-17 ENCOUNTER — HOSPITAL ENCOUNTER (EMERGENCY)
Facility: HOSPITAL | Age: 50
Discharge: HOME | End: 2020-03-18
Attending: EMERGENCY MEDICINE
Payer: MEDICAID

## 2020-03-17 DIAGNOSIS — R07.9 CHEST PAIN: ICD-10-CM

## 2020-03-17 DIAGNOSIS — M54.9 BACK PAIN, UNSPECIFIED BACK LOCATION, UNSPECIFIED BACK PAIN LATERALITY, UNSPECIFIED CHRONICITY: Primary | ICD-10-CM

## 2020-03-17 PROCEDURE — 63600175 PHARM REV CODE 636 W HCPCS: Performed by: EMERGENCY MEDICINE

## 2020-03-17 PROCEDURE — 25000003 PHARM REV CODE 250: Performed by: EMERGENCY MEDICINE

## 2020-03-17 PROCEDURE — 99284 EMERGENCY DEPT VISIT MOD MDM: CPT | Mod: 25

## 2020-03-17 PROCEDURE — 96372 THER/PROPH/DIAG INJ SC/IM: CPT

## 2020-03-17 RX ORDER — FLUOXETINE HYDROCHLORIDE 20 MG/1
CAPSULE ORAL
Status: ON HOLD | COMMUNITY
Start: 2020-01-26 | End: 2020-06-01 | Stop reason: HOSPADM

## 2020-03-17 RX ORDER — LIDOCAINE 50 MG/G
1 PATCH TOPICAL
Status: DISCONTINUED | OUTPATIENT
Start: 2020-03-18 | End: 2020-03-18 | Stop reason: HOSPADM

## 2020-03-17 RX ORDER — HYDROCODONE BITARTRATE AND ACETAMINOPHEN 5; 325 MG/1; MG/1
1 TABLET ORAL
Status: COMPLETED | OUTPATIENT
Start: 2020-03-17 | End: 2020-03-17

## 2020-03-17 RX ORDER — KETOROLAC TROMETHAMINE 30 MG/ML
30 INJECTION, SOLUTION INTRAMUSCULAR; INTRAVENOUS
Status: COMPLETED | OUTPATIENT
Start: 2020-03-17 | End: 2020-03-17

## 2020-03-17 RX ADMIN — LIDOCAINE 1 PATCH: 50 PATCH TOPICAL at 11:03

## 2020-03-17 RX ADMIN — HYDROCODONE BITARTRATE AND ACETAMINOPHEN 1 TABLET: 5; 325 TABLET ORAL at 11:03

## 2020-03-17 RX ADMIN — KETOROLAC TROMETHAMINE 30 MG: 30 INJECTION, SOLUTION INTRAMUSCULAR; INTRAVENOUS at 11:03

## 2020-03-18 VITALS
OXYGEN SATURATION: 100 % | TEMPERATURE: 98 F | BODY MASS INDEX: 27 KG/M2 | DIASTOLIC BLOOD PRESSURE: 74 MMHG | RESPIRATION RATE: 16 BRPM | HEIGHT: 61 IN | WEIGHT: 143 LBS | SYSTOLIC BLOOD PRESSURE: 128 MMHG | HEART RATE: 84 BPM

## 2020-03-18 PROCEDURE — 94799 UNLISTED PULMONARY SVC/PX: CPT

## 2020-03-18 PROCEDURE — 25000003 PHARM REV CODE 250: Performed by: EMERGENCY MEDICINE

## 2020-03-18 PROCEDURE — 63600175 PHARM REV CODE 636 W HCPCS: Performed by: EMERGENCY MEDICINE

## 2020-03-18 PROCEDURE — 99900035 HC TECH TIME PER 15 MIN (STAT)

## 2020-03-18 RX ORDER — CYCLOBENZAPRINE HCL 10 MG
10 TABLET ORAL 3 TIMES DAILY PRN
Qty: 15 TABLET | Refills: 0 | Status: SHIPPED | OUTPATIENT
Start: 2020-03-18 | End: 2020-03-23

## 2020-03-18 RX ORDER — MORPHINE SULFATE 10 MG/ML
4 INJECTION INTRAMUSCULAR; INTRAVENOUS; SUBCUTANEOUS
Status: COMPLETED | OUTPATIENT
Start: 2020-03-18 | End: 2020-03-18

## 2020-03-18 RX ORDER — LIDOCAINE 50 MG/G
1 PATCH TOPICAL DAILY
Qty: 15 PATCH | Refills: 0 | Status: SHIPPED | OUTPATIENT
Start: 2020-03-18

## 2020-03-18 RX ORDER — KETOROLAC TROMETHAMINE 10 MG/1
10 TABLET, FILM COATED ORAL EVERY 6 HOURS PRN
Qty: 20 TABLET | Refills: 0 | Status: SHIPPED | OUTPATIENT
Start: 2020-03-18 | End: 2020-05-12

## 2020-03-18 RX ADMIN — MORPHINE SULFATE 4 MG: 10 INJECTION INTRAVENOUS at 01:03
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
## 2020-04-15 ENCOUNTER — TELEPHONE (OUTPATIENT)
Dept: ORTHOPEDICS | Facility: CLINIC | Age: 50
End: 2020-04-15

## 2020-04-15 NOTE — TELEPHONE ENCOUNTER
----- Message from Tarsha Garcia sent at 4/15/2020  1:49 PM CDT -----  Contact: Self [phone]  Patient would like to speak with you about being in severe pain and needs surgery or pain medication. Please advise

## 2020-04-15 NOTE — TELEPHONE ENCOUNTER
Returned call to pt she is calling due to being in a lot of pain. She wants to have her surgery it was cancelled already. I told her that we are not scheduling surgeries right now. As soon as we start scheduling them again she will be call and put back on the surgery schedule.

## 2020-04-17 ENCOUNTER — HOSPITAL ENCOUNTER (EMERGENCY)
Facility: HOSPITAL | Age: 50
Discharge: HOME | End: 2020-04-17
Attending: EMERGENCY MEDICINE
Payer: MEDICAID

## 2020-04-17 VITALS
HEIGHT: 64 IN | WEIGHT: 150 LBS | DIASTOLIC BLOOD PRESSURE: 62 MMHG | SYSTOLIC BLOOD PRESSURE: 112 MMHG | BODY MASS INDEX: 25.61 KG/M2 | HEART RATE: 74 BPM | RESPIRATION RATE: 16 BRPM | OXYGEN SATURATION: 98 % | TEMPERATURE: 98 F

## 2020-04-17 DIAGNOSIS — Z79.899 POLYPHARMACY: Primary | ICD-10-CM

## 2020-04-17 DIAGNOSIS — T39.091A: ICD-10-CM

## 2020-04-17 DIAGNOSIS — R53.1 WEAKNESS: ICD-10-CM

## 2020-04-17 LAB
ALBUMIN SERPL BCP-MCNC: 3.7 G/DL (ref 3.5–5.2)
ALLENS TEST: ABNORMAL
ALP SERPL-CCNC: 92 U/L (ref 55–135)
ALT SERPL W/O P-5'-P-CCNC: 31 U/L (ref 10–44)
AMPHET+METHAMPHET UR QL: NEGATIVE
ANION GAP SERPL CALC-SCNC: 9 MMOL/L (ref 8–16)
APAP SERPL-MCNC: <3 UG/ML (ref 10–20)
AST SERPL-CCNC: 63 U/L (ref 10–40)
BARBITURATES UR QL SCN>200 NG/ML: NEGATIVE
BASOPHILS # BLD AUTO: 0.03 K/UL (ref 0–0.2)
BASOPHILS NFR BLD: 0.4 % (ref 0–1.9)
BENZODIAZ UR QL SCN>200 NG/ML: NEGATIVE
BILIRUB SERPL-MCNC: 0.1 MG/DL (ref 0.1–1)
BILIRUB UR QL STRIP: NEGATIVE
BUN SERPL-MCNC: 23 MG/DL (ref 6–20)
BZE UR QL SCN: NEGATIVE
CALCIUM SERPL-MCNC: 9.2 MG/DL (ref 8.7–10.5)
CANNABINOIDS UR QL SCN: NORMAL
CHLORIDE SERPL-SCNC: 109 MMOL/L (ref 95–110)
CLARITY UR: CLEAR
CO2 SERPL-SCNC: 27 MMOL/L (ref 23–29)
COLOR UR: NORMAL
CREAT SERPL-MCNC: 0.9 MG/DL (ref 0.5–1.4)
CREAT UR-MCNC: 21.7 MG/DL (ref 15–325)
DELSYS: ABNORMAL
DIFFERENTIAL METHOD: ABNORMAL
EOSINOPHIL # BLD AUTO: 0 K/UL (ref 0–0.5)
EOSINOPHIL NFR BLD: 0.1 % (ref 0–8)
ERYTHROCYTE [DISTWIDTH] IN BLOOD BY AUTOMATED COUNT: 12.9 % (ref 11.5–14.5)
EST. GFR  (AFRICAN AMERICAN): >60 ML/MIN/1.73 M^2
EST. GFR  (NON AFRICAN AMERICAN): >60 ML/MIN/1.73 M^2
ETHANOL SERPL-MCNC: <10 MG/DL
GLUCOSE SERPL-MCNC: 107 MG/DL (ref 70–110)
GLUCOSE UR QL STRIP: NEGATIVE
HCO3 UR-SCNC: 30.6 MMOL/L (ref 24–28)
HCT VFR BLD AUTO: 38.1 % (ref 37–48.5)
HGB BLD-MCNC: 12.8 G/DL (ref 12–16)
HGB UR QL STRIP: NEGATIVE
IMM GRANULOCYTES # BLD AUTO: 0.03 K/UL (ref 0–0.04)
IMM GRANULOCYTES NFR BLD AUTO: 0.4 % (ref 0–0.5)
KETONES UR QL STRIP: NEGATIVE
LEUKOCYTE ESTERASE UR QL STRIP: NEGATIVE
LYMPHOCYTES # BLD AUTO: 1.1 K/UL (ref 1–4.8)
LYMPHOCYTES NFR BLD: 14.9 % (ref 18–48)
MCH RBC QN AUTO: 31.6 PG (ref 27–31)
MCHC RBC AUTO-ENTMCNC: 33.6 G/DL (ref 32–36)
MCV RBC AUTO: 94 FL (ref 82–98)
METHADONE UR QL SCN>300 NG/ML: NEGATIVE
MONOCYTES # BLD AUTO: 0.2 K/UL (ref 0.3–1)
MONOCYTES NFR BLD: 3.4 % (ref 4–15)
NEUTROPHILS # BLD AUTO: 5.7 K/UL (ref 1.8–7.7)
NEUTROPHILS NFR BLD: 80.8 % (ref 38–73)
NITRITE UR QL STRIP: NEGATIVE
NRBC BLD-RTO: 0 /100 WBC
OPIATES UR QL SCN: NEGATIVE
PCO2 BLDA: 49.9 MMHG (ref 35–45)
PCP UR QL SCN>25 NG/ML: NEGATIVE
PH SMN: 7.4 [PH] (ref 7.35–7.45)
PH UR STRIP: 6 [PH] (ref 5–8)
PLATELET # BLD AUTO: 194 K/UL (ref 150–350)
PMV BLD AUTO: 9 FL (ref 9.2–12.9)
PO2 BLDA: 53 MMHG (ref 40–60)
POC BE: 5 MMOL/L
POC SATURATED O2: 86 % (ref 95–100)
POC TCO2: 32 MMOL/L (ref 24–29)
POTASSIUM SERPL-SCNC: 4.5 MMOL/L (ref 3.5–5.1)
PROT SERPL-MCNC: 7 G/DL (ref 6–8.4)
PROT UR QL STRIP: NEGATIVE
RBC # BLD AUTO: 4.05 M/UL (ref 4–5.4)
SALICYLATES SERPL-MCNC: 25.8 MG/DL (ref 15–30)
SALICYLATES SERPL-MCNC: 35.8 MG/DL (ref 15–30)
SAMPLE: ABNORMAL
SITE: ABNORMAL
SODIUM SERPL-SCNC: 145 MMOL/L (ref 136–145)
SP GR UR STRIP: 1.01 (ref 1–1.03)
TOXICOLOGY INFORMATION: NORMAL
TROPONIN I SERPL DL<=0.01 NG/ML-MCNC: <0.006 NG/ML (ref 0–0.03)
URN SPEC COLLECT METH UR: NORMAL
UROBILINOGEN UR STRIP-ACNC: NEGATIVE EU/DL
WBC # BLD AUTO: 7.05 K/UL (ref 3.9–12.7)

## 2020-04-17 PROCEDURE — 84484 ASSAY OF TROPONIN QUANT: CPT

## 2020-04-17 PROCEDURE — 96361 HYDRATE IV INFUSION ADD-ON: CPT

## 2020-04-17 PROCEDURE — 93010 EKG 12-LEAD: ICD-10-PCS | Mod: ,,, | Performed by: INTERNAL MEDICINE

## 2020-04-17 PROCEDURE — 80307 DRUG TEST PRSMV CHEM ANLYZR: CPT

## 2020-04-17 PROCEDURE — 80329 ANALGESICS NON-OPIOID 1 OR 2: CPT

## 2020-04-17 PROCEDURE — 80053 COMPREHEN METABOLIC PANEL: CPT

## 2020-04-17 PROCEDURE — 93005 ELECTROCARDIOGRAM TRACING: CPT

## 2020-04-17 PROCEDURE — 85025 COMPLETE CBC W/AUTO DIFF WBC: CPT

## 2020-04-17 PROCEDURE — 82803 BLOOD GASES ANY COMBINATION: CPT

## 2020-04-17 PROCEDURE — 25000003 PHARM REV CODE 250: Performed by: EMERGENCY MEDICINE

## 2020-04-17 PROCEDURE — 99900035 HC TECH TIME PER 15 MIN (STAT)

## 2020-04-17 PROCEDURE — 93010 ELECTROCARDIOGRAM REPORT: CPT | Mod: ,,, | Performed by: INTERNAL MEDICINE

## 2020-04-17 PROCEDURE — 96374 THER/PROPH/DIAG INJ IV PUSH: CPT

## 2020-04-17 PROCEDURE — 81003 URINALYSIS AUTO W/O SCOPE: CPT | Mod: 59

## 2020-04-17 PROCEDURE — 80320 DRUG SCREEN QUANTALCOHOLS: CPT

## 2020-04-17 PROCEDURE — 99285 EMERGENCY DEPT VISIT HI MDM: CPT | Mod: 25

## 2020-04-17 PROCEDURE — 82962 GLUCOSE BLOOD TEST: CPT

## 2020-04-17 RX ORDER — INDOMETHACIN 25 MG/1
50 CAPSULE ORAL
Status: COMPLETED | OUTPATIENT
Start: 2020-04-17 | End: 2020-04-17

## 2020-04-17 RX ADMIN — SODIUM BICARBONATE 50 MEQ: 84 INJECTION, SOLUTION INTRAVENOUS at 07:04

## 2020-04-17 RX ADMIN — SODIUM CHLORIDE 1000 ML: 0.9 INJECTION, SOLUTION INTRAVENOUS at 07:04

## 2020-04-17 NOTE — ED TRIAGE NOTES
Pt arrived via EMS with c/o drug overdose. Patient's family told EMS that patient takes both Baclofen and Suboxone. Pt drowsy, sonorous respirations noted. Pt sitting upright on stretcher, answers questions appropriately with repeated verbal or physical stimuli. Airway intact. NAD noted.

## 2020-04-17 NOTE — ED PROVIDER NOTES
Encounter Date: 4/17/2020       History     Chief Complaint   Patient presents with    Altered Mental Status     EMS reports pt with decreased level of consciousness since 2:30pm after taking her baclofen and suboxone. family reports finding her unconscious multiple times today initially only responsive to pain. now is responsive to verbal.      49 y.o. female Past Medical History:  No date: Asthma  No date: Bipolar 1 disorder  No date: Manic depression  No date: Seizures     [facility] ems for evaluation of altered mental status, pt notes that she took her regular baclofen/suboxone and was not trying to hurt herself. Took meds at around 2pm, noted to be drowsy about 2:30pm.  Notes that it typically makes her drowsy after taking it. Family was concerned because she was very somnulent. Pt denies SI/desire to hurt self.         Review of patient's allergies indicates:   Allergen Reactions    Grass pollen-june grass standard Itching     Past Medical History:   Diagnosis Date    Asthma     Bipolar 1 disorder     Manic depression     Seizures      Past Surgical History:   Procedure Laterality Date    APPENDECTOMY      CARPAL TUNNEL RELEASE      COSMETIC SURGERY      breast implants     HERNIA REPAIR      HYSTERECTOMY       Family History   Problem Relation Age of Onset    Schizophrenia Mother     Diabetes Father     Heart disease Father      Social History     Tobacco Use    Smoking status: Current Every Day Smoker     Packs/day: 0.50     Years: 30.00     Pack years: 15.00     Types: Cigarettes    Smokeless tobacco: Never Used   Substance Use Topics    Alcohol use: No    Drug use: Yes     Types: Marijuana     Review of Systems   Constitutional: Negative for fever.   HENT: Negative for sore throat.    Respiratory: Negative for shortness of breath.    Cardiovascular: Negative for chest pain.   Gastrointestinal: Negative for nausea.   Genitourinary: Negative for dysuria.   Musculoskeletal: Negative for back pain.    Skin: Negative for rash.   Neurological: Negative for weakness.   Hematological: Does not bruise/bleed easily.   All other systems reviewed and are negative.      Physical Exam     Initial Vitals [04/17/20 1747]   BP Pulse Resp Temp SpO2   (!) 140/70 88 13 98.2 °F (36.8 °C) 98 %      MAP       --         Physical Exam    Nursing note and vitals reviewed.  Constitutional: She appears well-developed and well-nourished.   HENT:   Head: Normocephalic and atraumatic.   Eyes: Conjunctivae and EOM are normal. Pupils are equal, round, and reactive to light.   Neck: Normal range of motion.   Cardiovascular: Normal rate and regular rhythm.   Pulmonary/Chest: Breath sounds normal. No respiratory distress.   Abdominal: She exhibits no distension.   Musculoskeletal: Normal range of motion.   Neurological: She is alert. No cranial nerve deficit. GCS score is 15. GCS eye subscore is 4. GCS verbal subscore is 5. GCS motor subscore is 6.   Skin: Skin is warm and dry.   Psychiatric: She has a normal mood and affect. Thought content normal.     drowsy but rousable alert/oriented,     ED Course   Procedures  Labs Reviewed   POCT GLUCOSE MONITORING CONTINUOUS          Imaging Results    None                                    Potassium 4.5, salicylate level is minimally elevated, will give an amp of bicarb, check vbg, obtain kub to exclude asa clump, repeat asa.    Labs Reviewed   CBC W/ AUTO DIFFERENTIAL - Abnormal; Notable for the following components:       Result Value    Mean Corpuscular Hemoglobin 31.6 (*)     MPV 9.0 (*)     Mono # 0.2 (*)     Gran% 80.8 (*)     Lymph% 14.9 (*)     Mono% 3.4 (*)     All other components within normal limits   COMPREHENSIVE METABOLIC PANEL - Abnormal; Notable for the following components:    BUN, Bld 23 (*)     AST 63 (*)     All other components within normal limits   SALICYLATE LEVEL - Abnormal; Notable for the following components:    Salicylate Lvl 35.8 (*)     All other components within  normal limits    Narrative:     Salicylate critical result(s) called and verbal readback obtained   from Wilbur Perdomo by [facility] 04/17/2020 19:13   ACETAMINOPHEN LEVEL - Abnormal; Notable for the following components:    Acetaminophen (Tylenol), Serum <3.0 (*)     All other components within normal limits   ISTAT PROCEDURE - Abnormal; Notable for the following components:    POC PCO2 49.9 (*)     POC HCO3 30.6 (*)     POC SATURATED O2 86 (*)     POC TCO2 32 (*)     All other components within normal limits   URINALYSIS, REFLEX TO URINE CULTURE    Narrative:     Preferred Collection Type->Urine, Clean Catch   DRUG SCREEN PANEL, URINE EMERGENCY    Narrative:     Preferred Collection Type->Urine, Clean Catch   ALCOHOL,MEDICAL (ETHANOL)   ACETAMINOPHEN LEVEL   SALICYLATE LEVEL   TROPONIN I   TROPONIN I   SALICYLATE LEVEL   POCT GLUCOSE MONITORING CONTINUOUS       X-Ray Abdomen AP 1 View (KUB)   Final Result      Distention of the stomach.      Overall nonobstructive bowel gas pattern.         Electronically signed by: Escobar Patrick MD   Date:    04/17/2020   Time:    20:06          Clinical Impression:       ICD-10-CM ICD-9-CM   1. Polypharmacy Z79.899 V58.69   2. Weakness R53.1 780.79   3. Salicylate intoxication T39.091A 965.1     E980.0                                Quentin Ferris MD  04/17/20 7509

## 2020-04-18 NOTE — DISCHARGE INSTRUCTIONS
Thank you for coming to our Emergency Department today. It is important to remember that some problems are difficult to diagnose and may not be found during your first visit. Be sure to follow up with your primary care doctor and review any labs/imaging that was performed with them. If you do not have a primary care doctor, you may contact the one listed on your discharge paperwork or you may also call the [facility] Appointment Desk at [phone] to schedule an appointment with one.     All medications may potentially have side effects and it is impossible to predict which medications may give you side effects. If you feel that you are having a negative effect of any medication you should immediately stop taking them and seek medical attention.    Return to the ER with any questions/concerns, new/concerning symptoms, worsening or failure to improve. Do not drive or make any important decisions for 24 hours if you have received any pain medications, sedatives or mood altering drugs during your ER visit.

## 2020-04-28 LAB — POCT GLUCOSE: 118 MG/DL (ref 70–110)

## 2020-05-07 NOTE — PROGRESS NOTES
CC: Right knee pain    Alina Menchaca is a 49 y.o. female here today for a pre-operative visit in preparation for a Right total knee arthroplasty to be performed by Dr. Ivory on 6/1/20.     Alina Menchaca has a chronic history of Right knee pain. Pain is worse with activity and weight bearing. Patient has experienced interference of activities of daily living due to decreased range of motion and an increase in joint pain and swelling. Patient has failed non-operative treatment including NSAIDs, corticosteroid injections, and activity modification. Alina Menchaca currently ambulates with a walker when needed.     she was last seen and treated in the clinic on 3/10/2020. she will be medically optimized by the pre op center. There has been no significant change in medical status since last visit. No fever, chills, malaise, or unexplained weight change.      She had a cold last weekend and was seen by . Was told she did not have covid and was given a z pack.     History of bipolar, depression, asthma, HTN, and seizures. History of chronic pain medication use in the past. She has not taken any pain medication in last 3 months.       Past Medical History:   Diagnosis Date    Asthma     Bipolar 1 disorder     Manic depression     Seizures        Past Surgical History:   Procedure Laterality Date    APPENDECTOMY      CARPAL TUNNEL RELEASE      COSMETIC SURGERY      breast implants     HERNIA REPAIR      HYSTERECTOMY         Family History   Problem Relation Age of Onset    Schizophrenia Mother     Diabetes Father     Heart disease Father        Review of patient's allergies indicates:   Allergen Reactions    Grass pollen-roxy grass standard Itching         Current Outpatient Medications:     albuterol (PROVENTIL) 2.5 mg /3 mL (0.083 %) nebulizer solution, Take 3 mLs (2.5 mg total) by nebulization every 6 (six) hours as needed for Wheezing., Disp: 30 Box, Rfl: 0    cetirizine (ZYRTEC) 10 MG tablet, Take 1  tablet (10 mg total) by mouth once daily. for 12 days, Disp: 12 tablet, Rfl: 0    divalproex (DEPAKOTE) 250 MG EC tablet, Take 1 tablet (250 mg total) by mouth every 8 (eight) hours., Disp: 90 tablet, Rfl: 3    lidocaine (LIDODERM) 5 %, Place 1 patch onto the skin once daily. Remove & Discard patch within 12 hours or as directed by MD, Disp: 15 patch, Rfl: 0    venlafaxine (EFFEXOR-XR) 150 MG Cp24, Take 150 mg by mouth once daily., Disp: , Rfl:     FLUoxetine 20 MG capsule, TK 1 C PO QD, Disp: , Rfl:     fluticasone-vilanterol (BREO) 100-25 mcg/dose diskus inhaler, Inhale 1 puff into the lungs once daily. Controller, Disp: 60 each, Rfl: 3    ondansetron (ZOFRAN) 8 MG tablet, Take 1 tablet (8 mg total) by mouth every 8 (eight) hours as needed for Nausea. (Patient not taking: Reported on 5/12/2020), Disp: 15 tablet, Rfl: 0    Review of Systems:  Constitutional: no fever or chills  Eyes: no visual changes  ENT: no nasal congestion or sore throat  Respiratory: no cough or shortness of breath  Cardiovascular: no chest pain or palpitations  Gastrointestinal: no nausea or vomiting, tolerating diet  Genitourinary: no hematuria or dysuria  Integument/Breast: no rash or pruritis  Hematologic/Lymphatic: no easy bruising or lymphadenopathy  Musculoskeletal: see HPI  Neurological: no seizures or tremors  Behavioral/Psych: no auditory or visual hallucinations  Endocrine: no heat or cold intolerance    PE:  /67   Pulse 84   Temp 98 °F (36.7 °C)   Resp 16   Wt 66.2 kg (146 lb)   BMI 25.06 kg/m²   General: Pleasant, cooperative, NAD   Gait: antalgic  HEENT: Normocephalic/Atraumatic, sclera nonicteric   Lungs: Respirations clear bilaterally; equal and unlabored.   CV: S1S2, 2+ bilateral upper and lower extremity pulses.   Skin: Intact throughout with no rashes, erythema, or lesions  Extremities: No LE edema,  no erythema or warmth of the skin in either lower extremity.    Right knee exam:  The patient walks with a  limp.  Hip irritability  negative.     The skin over the knee is intact.  Knee effusion trace  Tendernes is located along the joint line  Range of motion- Flexion 110 deg, Extension 0 deg,     Ligament laxity exam:   MCL 1+   Lachman 0   Post sag  0    LCL 0    Patellar apprehension negative.  Popliteal cyst negative  Patellar crepitation present.  Flexion/pinch positive    Pulses DP present, PT present.  Motor normal 5/5 strength in all tested muscle groups.   Sensory normal.       Radiographs: Radiographs reveal severe medial narrowing narrowing with complete loss of joint space medially.    Diagnosis: osteoarthritis Right knee    Plan: Right total knee arthroplasty on 6/1/20.    Pre-op labs to be done including CBC with diff, CMP.      Patient will be contacted by [facility] and by anesthesia for pre-op visits.    Patient will be screened for covid within 48 hours prior to surgery and will be screened for symptoms of covid up to 14 days postop.     Patient has 2 week postop scheduled with me on 6/16/20.

## 2020-05-12 ENCOUNTER — OFFICE VISIT (OUTPATIENT)
Dept: ORTHOPEDICS | Facility: CLINIC | Age: 50
End: 2020-05-12
Payer: MEDICAID

## 2020-05-12 VITALS
TEMPERATURE: 98 F | DIASTOLIC BLOOD PRESSURE: 67 MMHG | HEART RATE: 84 BPM | SYSTOLIC BLOOD PRESSURE: 100 MMHG | WEIGHT: 146 LBS | RESPIRATION RATE: 16 BRPM | BODY MASS INDEX: 25.06 KG/M2

## 2020-05-12 DIAGNOSIS — M17.11 PRIMARY OSTEOARTHRITIS OF RIGHT KNEE: Primary | ICD-10-CM

## 2020-05-12 DIAGNOSIS — Z01.818 OTHER SPECIFIED PRE-OPERATIVE EXAMINATION: Primary | ICD-10-CM

## 2020-05-12 PROCEDURE — 99213 OFFICE O/P EST LOW 20 MIN: CPT | Mod: PBBFAC,PN | Performed by: PHYSICIAN ASSISTANT

## 2020-05-12 PROCEDURE — 99999 PR PBB SHADOW E&M-EST. PATIENT-LVL III: ICD-10-PCS | Mod: PBBFAC,,, | Performed by: PHYSICIAN ASSISTANT

## 2020-05-12 PROCEDURE — 99999 PR PBB SHADOW E&M-EST. PATIENT-LVL III: CPT | Mod: PBBFAC,,, | Performed by: PHYSICIAN ASSISTANT

## 2020-05-12 PROCEDURE — 99499 NO LOS: ICD-10-PCS | Mod: S$PBB,,, | Performed by: PHYSICIAN ASSISTANT

## 2020-05-12 PROCEDURE — 99499 UNLISTED E&M SERVICE: CPT | Mod: S$PBB,,, | Performed by: PHYSICIAN ASSISTANT

## 2020-05-12 RX ORDER — VENLAFAXINE HYDROCHLORIDE 150 MG/1
150 CAPSULE, EXTENDED RELEASE ORAL DAILY
COMMUNITY

## 2020-05-30 ENCOUNTER — LAB VISIT (OUTPATIENT)
Dept: FAMILY MEDICINE | Facility: CLINIC | Age: 50
End: 2020-05-30
Attending: ORTHOPAEDIC SURGERY
Payer: MEDICAID

## 2020-05-30 DIAGNOSIS — Z01.818 OTHER SPECIFIED PRE-OPERATIVE EXAMINATION: ICD-10-CM

## 2020-05-30 DIAGNOSIS — M17.11 PRIMARY OSTEOARTHRITIS OF RIGHT KNEE: ICD-10-CM

## 2020-05-30 LAB
ALBUMIN SERPL BCP-MCNC: 4 G/DL (ref 3.5–5.2)
ALP SERPL-CCNC: 97 U/L (ref 38–126)
ALT SERPL W/O P-5'-P-CCNC: 18 U/L (ref 10–44)
ANION GAP SERPL CALC-SCNC: 6 MMOL/L (ref 8–16)
AST SERPL-CCNC: 42 U/L (ref 15–46)
BASOPHILS # BLD AUTO: 0.06 K/UL (ref 0–0.2)
BASOPHILS NFR BLD: 1.3 % (ref 0–1.9)
BILIRUB SERPL-MCNC: 0.4 MG/DL (ref 0.1–1)
BUN SERPL-MCNC: 21 MG/DL (ref 7–17)
CALCIUM SERPL-MCNC: 9.7 MG/DL (ref 8.7–10.5)
CHLORIDE SERPL-SCNC: 109 MMOL/L (ref 95–110)
CO2 SERPL-SCNC: 26 MMOL/L (ref 23–29)
CREAT SERPL-MCNC: 0.73 MG/DL (ref 0.5–1.4)
DIFFERENTIAL METHOD: ABNORMAL
EOSINOPHIL # BLD AUTO: 0.2 K/UL (ref 0–0.5)
EOSINOPHIL NFR BLD: 3.4 % (ref 0–8)
ERYTHROCYTE [DISTWIDTH] IN BLOOD BY AUTOMATED COUNT: 13.1 % (ref 11.5–14.5)
EST. GFR  (AFRICAN AMERICAN): >60 ML/MIN/1.73 M^2
EST. GFR  (NON AFRICAN AMERICAN): >60 ML/MIN/1.73 M^2
GLUCOSE SERPL-MCNC: 108 MG/DL (ref 70–110)
HCT VFR BLD AUTO: 44.8 % (ref 37–48.5)
HGB BLD-MCNC: 14.9 G/DL (ref 12–16)
IMM GRANULOCYTES # BLD AUTO: 0.03 K/UL (ref 0–0.04)
IMM GRANULOCYTES NFR BLD AUTO: 0.6 % (ref 0–0.5)
LYMPHOCYTES # BLD AUTO: 1.6 K/UL (ref 1–4.8)
LYMPHOCYTES NFR BLD: 34.4 % (ref 18–48)
MCH RBC QN AUTO: 31.3 PG (ref 27–31)
MCHC RBC AUTO-ENTMCNC: 33.3 G/DL (ref 32–36)
MCV RBC AUTO: 94 FL (ref 82–98)
MONOCYTES # BLD AUTO: 0.5 K/UL (ref 0.3–1)
MONOCYTES NFR BLD: 10.3 % (ref 4–15)
NEUTROPHILS # BLD AUTO: 2.3 K/UL (ref 1.8–7.7)
NEUTROPHILS NFR BLD: 50 % (ref 38–73)
NRBC BLD-RTO: 0 /100 WBC
PLATELET # BLD AUTO: 241 K/UL (ref 150–350)
PMV BLD AUTO: 9.6 FL (ref 9.2–12.9)
POTASSIUM SERPL-SCNC: 4.4 MMOL/L (ref 3.5–5.1)
PROT SERPL-MCNC: 7.3 G/DL (ref 6–8.4)
RBC # BLD AUTO: 4.76 M/UL (ref 4–5.4)
SODIUM SERPL-SCNC: 141 MMOL/L (ref 136–145)
WBC # BLD AUTO: 4.65 K/UL (ref 3.9–12.7)

## 2020-05-30 PROCEDURE — 80053 COMPREHEN METABOLIC PANEL: CPT

## 2020-05-30 PROCEDURE — U0003 INFECTIOUS AGENT DETECTION BY NUCLEIC ACID (DNA OR RNA); SEVERE ACUTE RESPIRATORY SYNDROME CORONAVIRUS 2 (SARS-COV-2) (CORONAVIRUS DISEASE [COVID-19]), AMPLIFIED PROBE TECHNIQUE, MAKING USE OF HIGH THROUGHPUT TECHNOLOGIES AS DESCRIBED BY CMS-2020-01-R: HCPCS

## 2020-05-30 PROCEDURE — 36415 COLL VENOUS BLD VENIPUNCTURE: CPT

## 2020-05-30 PROCEDURE — 85025 COMPLETE CBC W/AUTO DIFF WBC: CPT

## 2020-05-31 LAB — SARS-COV-2 RNA RESP QL NAA+PROBE: NOT DETECTED

## 2020-06-01 PROBLEM — Z96.651 HISTORY OF RIGHT KNEE JOINT REPLACEMENT: Status: ACTIVE | Noted: 2020-06-01

## 2020-06-01 PROBLEM — M17.11 PRIMARY OSTEOARTHRITIS OF RIGHT KNEE: Status: ACTIVE | Noted: 2020-06-01

## 2020-06-05 ENCOUNTER — CLINICAL SUPPORT (OUTPATIENT)
Dept: REHABILITATION | Facility: HOSPITAL | Age: 50
End: 2020-06-05
Attending: ORTHOPAEDIC SURGERY
Payer: MEDICAID

## 2020-06-05 DIAGNOSIS — Z96.651 HISTORY OF RIGHT KNEE JOINT REPLACEMENT: ICD-10-CM

## 2020-06-05 DIAGNOSIS — R26.9 GAIT ABNORMALITY: ICD-10-CM

## 2020-06-05 DIAGNOSIS — R29.898 DECREASED STRENGTH INVOLVING KNEE JOINT: ICD-10-CM

## 2020-06-05 DIAGNOSIS — M25.561 CHRONIC PAIN OF RIGHT KNEE: ICD-10-CM

## 2020-06-05 DIAGNOSIS — G89.29 CHRONIC PAIN OF RIGHT KNEE: ICD-10-CM

## 2020-06-05 DIAGNOSIS — M25.661 DECREASED RANGE OF MOTION OF RIGHT KNEE: ICD-10-CM

## 2020-06-05 PROCEDURE — 97110 THERAPEUTIC EXERCISES: CPT | Mod: PN

## 2020-06-05 PROCEDURE — 97161 PT EVAL LOW COMPLEX 20 MIN: CPT | Mod: PN

## 2020-06-05 NOTE — PLAN OF CARE
"[facility]  Physical Therapy Initial Evaluation    Name: Alina Menchaca  Clinic Number: [id number]    Therapy Diagnosis:   Encounter Diagnoses   Name Primary?    History of right knee joint replacement     Decreased strength involving knee joint     Decreased range of motion of right knee     Gait abnormality     Chronic pain of right knee      Physician: Vasiliy Ivory MD    Physician Orders: PT Eval and Treat     Medical Diagnosis from Referral:      Z96.651 (ICD-10-CM) - History of right knee joint replacement     Evaluation Date: 6/5/2020  Authorization Period Expiration: 12/31/2020  Plan of Care Expiration: 9/5/2020  Visit # / Visits authorized: 1/ 30    Time In: 2:30   Time Out: 3:15  Total Billable Time: 45 minutes    Precautions: Standard    Subjective   Date of onset:  S/p TKA 6/5/2020  History of current condition - Alina reports: TKA on R knee Monday. Pt reports she has been walking with rotator since surgery. States prior to surgery she ambulated without assistive device. Pt reports she is currently in 8/10 pain. Pt states no steps to enter current house. States she is planning on moving soon to a second story apartment.      Pain:  Current 8/10, worst 10/10, best 8/10   Location: single story with son and daughter   Description: Sharp  Aggravating Factors: pain upon waking   Easing Factors: ice and elevation    Prior Therapy: while ago on carpal   Social History: lives with daughter's family   Occupation: not working, disabled  Prior Level of Function: some limitations due to OA  Current Level of Function: limited in walking comfortably, go slow stay focused    Pts goals: "Learn how to do PT so I don't have to do pain pills"     Medical History:   Past Medical History:   Diagnosis Date    Asthma     Bipolar 1 disorder     Manic depression     Seizures        Surgical History:   Alina Menchaca  has a past surgical history that includes Hysterectomy; Appendectomy; " Carpal tunnel release; Hernia repair; Cosmetic surgery; and Knee Arthroplasty (Right, 6/1/2020).    Medications:   Alina has a current medication list which includes the following prescription(s): albuterol, aspirin, cetirizine, divalproex, fluticasone furoate-vilanterol, lidocaine, magnesium oxide, nicotine, ondansetron, oxycodone-acetaminophen, quetiapine, venlafaxine, and vitamin e.    Allergies:   Review of patient's allergies indicates:   Allergen Reactions    Grass pollen-june grass standard Itching          Objective     Observation: Wound covered with bandage this date. No significant swelling, redness to indicate infection    Posture Alignment: pt with forward trunk lean during gait    GAIT DEVIATIONS: Alina displays decreased gait speed, decreased R knee flexion during swing phase on R LE, forward trunk lean during gait    Range of Motion:   Knee Left active Left Passive   Flexion WFL WFL   Extension WFL WFL     Knee Right active Right Passive   Flexion 95 100   Extension 20 24       Lower Extremity Strength   Right LE  Left LE    Knee extension: 3/5 Knee extension: 5/5   Knee flexion: 3/5 Knee flexion: 5/5   Hip flexion: 3/5 Hip flexion: 5/5   Ankle dorsiflexion: 3/5 Ankle dorsiflexion: 5/5   Ankle plantarflexion: 3/5 Ankle plantarflexion: 5/5           TREATMENT   Treatment Time In:  3:00   Treatment Time Out: 3:15  Total Treatment time separate from Evaluation: 15 minutes    Alina received therapeutic exercises to develop strength, ROM and flexibility for 15 minutes including:  +Knee Flexion EOM R LE assisted by L LE x 10, 10 sec holds  +Heel slides 2x10, 10 sec holds  +Quad Sets with knee on towel 2x10, 5 sec holds    Home Exercises and Patient Education Provided    Education provided:   - role of PT, plan of care, goals, scheduling conflicts, cancellation policies    Written Home Exercises Provided: yes.  Exercises were reviewed and Alina was able to demonstrate them prior to the end of the  session.  Alina demonstrated good  understanding of the education provided.     See EMR under Patient Instructions for exercises provided 6/5/2020.    Assessment   Alina is a 50 y.o. female referred to outpatient Physical Therapy with a medical diagnosis of history of R knee TKA on 6/1/20. Pt presents with signs and symptoms consistent with medical diagnoses s/p TKA. Pt with decreased ROM in flexion and extension. Pt also with poor quadriceps activation as noted by inability to complete quad set. Pt with limited strength of R LE following surgery. Pt currently limited in all household and community activities due to functional mobility. Pt would benefit from skilled PT services in order to address listed deficits, establish HEP, improve functional mobility, and facilitate return to PLOF. Pt is motivated to participate in therapy and is in agreement with POC.    Pt prognosis is Good.   Pt will benefit from skilled outpatient Physical Therapy to address the deficits stated above and in the chart below, provide pt/family education, and to maximize pt's level of independence.     Plan of care discussed with patient: Yes  Pt's spiritual, cultural and educational needs considered and patient is agreeable to the plan of care and goals as stated below:     Anticipated Barriers for therapy: none    Medical Necessity is demonstrated by the following  History  Co-morbidities and personal factors that may impact the plan of care Co-morbidities:   Bipolar 1, asthma, Raynauds    Personal Factors:   no deficits     low   Examination  Body Structures and Functions, activity limitations and participation restrictions that may impact the plan of care Body Regions:   lower extremities    Body Systems:    ROM  strength  balance  gait  transfers  motor control    Participation Restrictions:   Ability to walk without rollator    Activity limitations:   Learning and applying knowledge  no deficits    General Tasks and Commands  no  deficits    Communication  no deficits    Mobility  walking  driving (bike, car, motorcycle)    Self care  no deficits    Domestic Life  shopping  cooking  doing house work (cleaning house, washing dishes, laundry)  assisting others    Interactions/Relationships  no deficits    Life Areas  no deficits    Community and Social Life  no deficits         Complexity: low   Clinical Presentation stable and uncomplicated low   Decision Making/ Complexity Score: low       GOALS: Short Term Goals:  4 weeks  1.Report decreased in pain at worse less than  <   / =   6/10  to increase tolerance for functional mobility.On going  2. Pt to improve R Knee range of motion by 25% to allow for improved functional mobility to allow for improvement in IADLs. .On going  3. Increased R knee MMT 1/2 grade to increase tolerance for ADL and work activities.On going  4. Pt to demonstrate ability to ambulate x 100 ft without rollator in order to return to PLOF.  5. Pt to tolerate HEP to improve ROM and independence with ADL's.On going    Long Term Goals: 8 weeks  1.Report decreased in pain at worse less than  <   / =  2/10  to increase tolerance for functional mobility.  2.Pt to improve range of motion by 75% to allow for improved functional mobility to allow for improvement in IADLs.   3.Increased R Knee MMT 1 grade to increase tolerance for ADL and work activities.  4. Pt will report < / 45% limitation on FOTO knee survey  to demonstrate increase in LE function with every day tasks.   5. Pt to be Independent with HEP to improve ROM and independence with ADL's    Plan   Plan of care Certification: 6/5/2020 to 9/5/2020.    Outpatient Physical Therapy 2 times weekly for 8 weeks to include the following interventions: Gait Training, Manual Therapy, Moist Heat/ Ice, Neuromuscular Re-ed, Patient Education, Self Care, Therapeutic Activites and Therapeutic Exercise.       Kayla Julien, PT, DPT  6/5/2020

## 2020-06-08 ENCOUNTER — NURSE TRIAGE (OUTPATIENT)
Dept: ADMINISTRATIVE | Facility: CLINIC | Age: 50
End: 2020-06-08

## 2020-06-08 ENCOUNTER — TELEPHONE (OUTPATIENT)
Dept: ORTHOPEDICS | Facility: CLINIC | Age: 50
End: 2020-06-08

## 2020-06-08 ENCOUNTER — HOSPITAL ENCOUNTER (EMERGENCY)
Facility: HOSPITAL | Age: 50
Discharge: HOME | End: 2020-06-08
Attending: EMERGENCY MEDICINE
Payer: MEDICAID

## 2020-06-08 VITALS
SYSTOLIC BLOOD PRESSURE: 143 MMHG | WEIGHT: 135 LBS | TEMPERATURE: 99 F | RESPIRATION RATE: 16 BRPM | DIASTOLIC BLOOD PRESSURE: 69 MMHG | HEIGHT: 61 IN | BODY MASS INDEX: 25.49 KG/M2 | HEART RATE: 98 BPM | OXYGEN SATURATION: 98 %

## 2020-06-08 DIAGNOSIS — G89.18 POSTOPERATIVE PAIN: Primary | ICD-10-CM

## 2020-06-08 PROCEDURE — 99283 EMERGENCY DEPT VISIT LOW MDM: CPT

## 2020-06-08 PROCEDURE — 25000003 PHARM REV CODE 250: Performed by: EMERGENCY MEDICINE

## 2020-06-08 RX ORDER — OXYCODONE AND ACETAMINOPHEN 5; 325 MG/1; MG/1
2 TABLET ORAL
Status: COMPLETED | OUTPATIENT
Start: 2020-06-08 | End: 2020-06-08

## 2020-06-08 RX ORDER — OXYCODONE AND ACETAMINOPHEN 5; 325 MG/1; MG/1
1 TABLET ORAL EVERY 8 HOURS PRN
Qty: 6 TABLET | Refills: 0 | Status: SHIPPED | OUTPATIENT
Start: 2020-06-08 | End: 2020-06-09

## 2020-06-08 RX ADMIN — OXYCODONE HYDROCHLORIDE AND ACETAMINOPHEN 2 TABLET: 5; 325 TABLET ORAL at 09:06

## 2020-06-08 NOTE — TELEPHONE ENCOUNTER
"Pt states she had a sx in [place] at ortho clinic with Dr Ivory last Monday. Ran out of pain pills this morning. Had an epidural and they had to do it twice. Sx on R knee, total knee replacement. Left side is completely numb, tailbone is killing her. Back pain (tailbone) Looking for refill of pain medication. Feels like a rubber band on her left leg. Like its numb and tingly.  RN offered to triage pt and she refused. Pt stated she wanted her pain medication refilled. Informed the refill request was denied today due to a pain contract. Pt stated she has never been to a pain mgmt MD or signed a pain contract. Pt got angry and rude. She stated she was going to call the owner of [facility], RN stated she could do that if she needed to or she can go to the  ER for her symptoms and they may be able to give her a dose of pain medication for the night. Pt called the RN a "stupid bitch" and hung up the phone.  "

## 2020-06-08 NOTE — TELEPHONE ENCOUNTER
Reason for Disposition   Caller hangs up    Additional Information   Negative: Violent behavior, or threatening to physically hurt or kill someone   Negative: [1] Caller is very confused AND [2] no other adult (e.g., friend or family member) available   Negative: Sounds like a life-threatening emergency to the triager   Negative: Patient sounds very sick or weak to the triager   Negative: [1] [place] worried caller AND [2] second call within 4 hours about the same medical problem   Negative: [1] [place] worried caller AND [2] third call within 48 hours about the same medical problem   Negative: [1] [place] worried caller AND [2] can't be reassured by triager   Negative: [1] Caller demands to speak with the PCP AND [2] about sick adult (or sick caller)   Negative: [1] Caller mainly has complaints about past medical care, billing, etc AND [2] has mild symptoms or is well   Negative: Difficult caller responded to phone counseling   Negative: Caller is requesting health information that triager feels is unethical or illegal   Negative: [1] Caller continues to be verbally abusive AND [2] after triager sets BOUNDARIES AND [3] Triager ends call    Protocols used: DIFFICULT CALLER[facility]

## 2020-06-09 RX ORDER — OXYCODONE AND ACETAMINOPHEN 5; 325 MG/1; MG/1
1 TABLET ORAL EVERY 12 HOURS PRN
Qty: 40 TABLET | Refills: 0 | OUTPATIENT
Start: 2020-06-09 | End: 2020-11-11

## 2020-06-09 NOTE — ED PROVIDER NOTES
"Encounter Date: 6/8/2020    SCRIBE #1 NOTE: I, Yemi Rosenberg, am scribing for, and in the presence of,  Brett Syed MD. I have scribed the following portions of the note - Other sections scribed: HPI, ROS, PE.       History     Chief Complaint   Patient presents with    Knee Pain     Patient c/o right knee pain, secondary to right knee replacement on 06/01/2020 x 1 day.  Reports that she "ran out of pain medication" this morning and was unable to get a refill from her physician that completed the surgery.  Additionally, patient c/o numbness to the left lateral leg x 5 days.  Ambulatory with mild difficulty in triage.       Alina Menchaca is an 50 y.o. female smoker presenting to the ED complaining of a sudden onset of right knee pain that started today. Patient reports complaint is secondary to right knee arthroplasty that she underwent on June 1st and states she ran out of prescribed pain control. Patient reports symptoms of numbness of the right leg. Patient also endorses symptoms of back pain secondary to epidural procedure. Prior attempt at treatment reported with transdermal analgesic patches to the back without relief. Patient is currently on Oxycodone and Acetaminophen medication. Patient has a past medical history of bipolar 1 disorder and manic depression. Patient reports a past surgical history of right knee arthroplasty. Patient is allergic to grass pollen. No known drug allergies. Patient smokes half a pack of tobacco cigarettes per day. Patient used marijuana two days ago. No EtOH use.    The history is provided by the patient.     Review of patient's allergies indicates:   Allergen Reactions    Grass pollen-roxy grass standard Itching     Past Medical History:   Diagnosis Date    Asthma     Bipolar 1 disorder     Manic depression     Seizures      Past Surgical History:   Procedure Laterality Date    APPENDECTOMY      CARPAL TUNNEL RELEASE      COSMETIC SURGERY      breast implants     " HERNIA REPAIR      HYSTERECTOMY      KNEE ARTHROPLASTY Right 6/1/2020    Procedure: ARTHROPLASTY, KNEE;  Surgeon: Vasiliy Ivory MD;  [facility];  Service: Orthopedics;  Laterality: Right;     Family History   Problem Relation Age of Onset    Schizophrenia Mother     Diabetes Father     Heart disease Father      Social History     Tobacco Use    Smoking status: Current Every Day Smoker     Packs/day: 0.50     Years: 30.00     Pack years: 15.00     Types: Cigarettes    Smokeless tobacco: Never Used   Substance Use Topics    Alcohol use: No    Drug use: Yes     Types: Marijuana     Comment: last smoked day before yesterday     Review of Systems   Constitutional: Negative for chills, diaphoresis and fever.   HENT: Negative for congestion, postnasal drip, rhinorrhea, sinus pressure, sneezing and sore throat.    Eyes: Negative for discharge and redness.   Respiratory: Negative for cough and shortness of breath.    Cardiovascular: Negative for chest pain and leg swelling.   Gastrointestinal: Negative for abdominal pain, blood in stool, constipation, diarrhea, nausea and vomiting.   Genitourinary: Negative for dysuria, hematuria, pelvic pain, vaginal bleeding, vaginal discharge and vaginal pain.   Musculoskeletal: Positive for arthralgias and back pain. Negative for myalgias.   Skin: Negative for rash and wound.   Neurological: Negative for speech difficulty, weakness, light-headedness and headaches.   Hematological: Does not bruise/bleed easily.   Psychiatric/Behavioral: Negative for agitation and confusion. The patient is not nervous/anxious.        Physical Exam     Initial Vitals [06/08/20 2132]   BP Pulse Resp Temp SpO2   (!) 143/69 98 16 98.7 °F (37.1 °C) 98 %      MAP       --         Physical Exam    Nursing note and vitals reviewed.  Constitutional: She appears well-developed and well-nourished.   HENT:   Head: Normocephalic and atraumatic.   Eyes: Conjunctivae and EOM are normal. Pupils are  equal, round, and reactive to light.   Neck: Normal range of motion. Neck supple. No thyromegaly present.   Cardiovascular: Normal rate and intact distal pulses.   Pulmonary/Chest: No respiratory distress.   Abdominal: Soft.   Musculoskeletal: Normal range of motion. She exhibits no edema or tenderness.   TIMO with 5/5 strength and full ROM.  Back has no gross deformities with no midline TTP and no deformities.   Neurological: She is alert and oriented to person, place, and time. She has normal strength. GCS score is 15. GCS eye subscore is 4. GCS verbal subscore is 5. GCS motor subscore is 6.   Skin: Skin is warm and dry. Capillary refill takes less than 2 seconds. No rash and no abscess noted. No erythema. No pallor.   R knee has a surgical wound closed with staples from recent arthroplasty that is c/d/i with no SSTI.     Psychiatric: She has a normal mood and affect. Her behavior is normal. Thought content normal.         ED Course   Procedures  Labs Reviewed - No data to display       Imaging Results    None         Pt arrived alert, afebrile, non-toxic in appearance, in no acute respiratory distress with VSS.  Pt has no PE findings to raise concern for cord compression, cauda equina, epidural hematoma or abscess or any other pathology that would warrant imaging at this time. R knee surgical wound is C/D/I with no SSTI.  Pt given one dose of Percocet here and a two day supply.  She was counseled she must go to her orthopedic surgeon for any further prescriptions of opiate analgesics.  Pt discharged and counseled on the need to return to the nearest emergency room if they experience any other concerning symptoms.  Pt counseled to F/U outpatient with a PCP over the next week.    Brett Syed MD                                           Clinical Impression:     1. Postoperative pain                ED Disposition Condition    Discharge Stable        ED Prescriptions     Medication Sig Dispense Start Date End  Date Auth. Provider    oxyCODONE-acetaminophen (PERCOCET) 5-325 mg per tablet Take 1 tablet by mouth every 8 (eight) hours as needed for Pain. 6 tablet 6/8/2020 6/14/2020 Brett Syed MD        Follow-up Information     Follow up With Specialties Details Why Contact Info    Vasiliy Mullen MD, your orthopedic surgeon  Call  tomorrow to follow-up on today's visit [phone]    [facility] Go to  As needed, If symptoms worsen [address] [facility] [address] [phone]                               I, Brett Syed, personally performed the services described in this documentation. All medical record entries made by the scribe were at my direction and in my presence.  I have reviewed the chart and agree that the record reflects my personal performance and is accurate and complete.         Brett Syed MD  06/08/20 5307

## 2020-06-09 NOTE — ED TRIAGE NOTES
Pt presents to the ED via personal transportation reporting she had surgery done on her right knee due to having a knee replacement x 1 day. Pt reporting increasing pain as well as numbness to the left leg as well. Pt has staples noted to right leg with dressing intact. Dressing clean and dry.

## 2020-06-09 NOTE — TELEPHONE ENCOUNTER
Spoke with patient she states that she had surgery on her right knee.  Patient reports that her entire right leg has swelling down to her ankle.  Patient also states tat she has numbness and tingling on the outside of her left leg.  Patient reports that she is able to walk at this time.  Reports that she was not made aware during her surgery that she had an epidural.  Patient states that she spoke with nurse at Dr. Ivory office whom told her she was under a pain contract.  Patient states that she was never made aware of being under a pain contract. Reports that she took her last pain pill at 9 am this morning and she doesn't know what to do.  Advised patient to go to ER and have another adult drive.  Patient verbalized understanding.      Reason for Disposition   Patient sounds very sick or weak to the triager    Additional Information   Negative: Sounds like a life-threatening emergency to the triager   Negative: [1] Swollen joint AND [2] fever   Negative: [1] Red area or streak AND [2] fever    Protocols used: KNEE PAIN-A-AH

## 2020-06-10 NOTE — PROGRESS NOTES
POSTOP VISIT FOR RIGHT TKA:  (Dianelys)    Alina Menchaca is here today for a 2 week follow up of right TKA by Dr. Ivory on 6/1/20.  She is taking percocet for expected postop pain. She was given a 2 week supply by Dr. Ivory last week and he discussed with her that this would be her last prescription. She went to ED on 6/8/20 for pain.    She has minimal knee pain. She has right sided mid to lower back pain and has developed a rash that appears to be shingles. This is causing her much more pain than her knee. No history of shingles. She is having difficulty sleeping due to pain.     She is in PT for her knee and it is doing well. She is ambulating without any assistive device.     EXAM:  A well developed female in no distress. Tearful due to pain.   Alert and oriented. Breathing in unlabored. Mood and affect are appropriate.     Staples are intact to RIGHT knee incision. No signs of surrounding erythema, drainage, or fluctuance. No evidence of infection noted. Reasonable ROM of the knee with flexion to 90 and 5 extension. The extremity is neurovascularly intact. Calf is supple and non tender.     She has rash to right TL region that appears to be shingles. See photo under media section in her chart.       XRAY INTERPRETATION:   X-rays of right knee dated 6/16/20 are personally reviewed and show a well fixed and positioned prosthesis.      Impression: Doing well 2 weeks s/p TKA however in pain due to probable shingles    Plan:  - Staples removed today and patient instructed on wound care. Okay to shower. Do not submerge incision.   - Reviewed signs of infection. Patient to call with any concerns  - Continue on percocet as needed for pain. No further refills per Dr. Ivory. He discussed this with her last week on the phone.   - PCP is at [facility]- she will call him today regarding shingles.     F/u in 4 weeks for 6 week postop visit with Dr. Ivory.

## 2020-06-14 ENCOUNTER — NURSE TRIAGE (OUTPATIENT)
Dept: ADMINISTRATIVE | Facility: CLINIC | Age: 50
End: 2020-06-14

## 2020-06-14 NOTE — TELEPHONE ENCOUNTER
Attempted to contact pt on behalf of Post Procedural Symptom Tracker. No answer. Someone will call pt back tomorrow.     Reason for Disposition   No answer.  First attempt to contact caller.  Follow-up call scheduled within 15 minutes.    Protocols used: NO CONTACT OR DUPLICATE CONTACT CALL-A-AH

## 2020-06-16 ENCOUNTER — OFFICE VISIT (OUTPATIENT)
Dept: ORTHOPEDICS | Facility: CLINIC | Age: 50
End: 2020-06-16
Payer: MEDICAID

## 2020-06-16 VITALS
DIASTOLIC BLOOD PRESSURE: 70 MMHG | RESPIRATION RATE: 18 BRPM | BODY MASS INDEX: 25.13 KG/M2 | SYSTOLIC BLOOD PRESSURE: 124 MMHG | WEIGHT: 133 LBS | HEART RATE: 68 BPM

## 2020-06-16 DIAGNOSIS — Z96.651 S/P TOTAL KNEE REPLACEMENT, RIGHT: ICD-10-CM

## 2020-06-16 DIAGNOSIS — M17.11 PRIMARY OSTEOARTHRITIS OF RIGHT KNEE: Primary | ICD-10-CM

## 2020-06-16 PROCEDURE — 99999 PR PBB SHADOW E&M-EST. PATIENT-LVL III: ICD-10-PCS | Mod: PBBFAC,,, | Performed by: PHYSICIAN ASSISTANT

## 2020-06-16 PROCEDURE — 99024 POSTOP FOLLOW-UP VISIT: CPT | Mod: ,,, | Performed by: PHYSICIAN ASSISTANT

## 2020-06-16 PROCEDURE — 99999 PR PBB SHADOW E&M-EST. PATIENT-LVL III: CPT | Mod: PBBFAC,,, | Performed by: PHYSICIAN ASSISTANT

## 2020-06-16 PROCEDURE — 99024 PR POST-OP FOLLOW-UP VISIT: ICD-10-PCS | Mod: ,,, | Performed by: PHYSICIAN ASSISTANT

## 2020-06-16 PROCEDURE — 99213 OFFICE O/P EST LOW 20 MIN: CPT | Mod: PBBFAC,PN | Performed by: PHYSICIAN ASSISTANT

## 2020-06-16 NOTE — PATIENT INSTRUCTIONS
Your right knee xrays look good.     Take a 5 minute walk after each meal and work on bending/straightening exercises for a few minutes.     Okay to shower and get incision wet/soapy. Just pat dry. No submerging the leg, no baths or hot tubs.     Your incision looks great. Watch for any signs of infection such as redness, drainage, fever, chills, or increased pain.     Call Dr. Mckeon today regarding shingles.     You will see Dr. Ivory in 4 weeks for a recheck.     Call or email me if you need anything.     Gayathri   [phone]

## 2020-06-21 ENCOUNTER — PATIENT MESSAGE (OUTPATIENT)
Dept: ORTHOPEDICS | Facility: CLINIC | Age: 50
End: 2020-06-21

## 2020-06-22 ENCOUNTER — DOCUMENTATION ONLY (OUTPATIENT)
Dept: REHABILITATION | Facility: HOSPITAL | Age: 50
End: 2020-06-22

## 2020-06-22 RX ORDER — OXYCODONE AND ACETAMINOPHEN 5; 325 MG/1; MG/1
1 TABLET ORAL EVERY 12 HOURS PRN
Qty: 40 TABLET | Refills: 0 | OUTPATIENT
Start: 2020-06-22

## 2020-06-22 NOTE — TELEPHONE ENCOUNTER
Patient was offered an appt for this week, she asked for Wednesday so she can get transportation, appt given, she said she has been waiting for weeks to get a call back from the patient advocate, says her and her daughter both called the first week of June a few times,she wants to report that Dr. Ivory said 'listen to me and shut up, I will make sure no doctor ever writes narcotics for you ever again' says he also said she was under a pain contract and that isn't true. She said she smokes weed for the pain as well and is going to ask for a medical marijuana card because we don't want to help her. Patient was very erattic and dramatic at times, crying on and off.

## 2020-06-22 NOTE — PROGRESS NOTES
Pt had appointment scheduled for 3:45 pm. Pt did not show up for appointment. Pt has cancelled of no-showed all of her appointments since initial evaluation. Pt has not been seen by PT since evaluation on 6/5/2020.     Jeannette Matias, PT, DPT  6/22/2020

## 2020-06-23 ENCOUNTER — TELEPHONE (OUTPATIENT)
Dept: ORTHOPEDICS | Facility: CLINIC | Age: 50
End: 2020-06-23

## 2020-06-23 NOTE — TELEPHONE ENCOUNTER
Called patient yesterday afternoon to follow up with her. She is still hurting and has nothing for pain. She was given enough pain meds to last by Dr. Ivory.  She would like an appt here with Gayathri so I scheduled her one for this week. She said she called patient advocate on June 1st and her daughter called twice after that and they have not heard back. I will follow up with patient advocate.

## 2020-06-26 ENCOUNTER — DOCUMENTATION ONLY (OUTPATIENT)
Dept: REHABILITATION | Facility: HOSPITAL | Age: 50
End: 2020-06-26

## 2020-06-26 NOTE — PROGRESS NOTES
Pt no-showed therapy appointment scheduled for 6/26/2020 at 3:00 pm. This is 4 appointments that pt has no-shown. Pt has not been in PT since her initial evaluation on 6/5/2020.     Jeannette Matias, PT, DPT   6/26/2020

## 2020-11-02 ENCOUNTER — HOSPITAL ENCOUNTER (EMERGENCY)
Facility: HOSPITAL | Age: 50
Discharge: HOME | End: 2020-11-02
Attending: EMERGENCY MEDICINE
Payer: MEDICAID

## 2020-11-02 VITALS
TEMPERATURE: 98 F | DIASTOLIC BLOOD PRESSURE: 60 MMHG | WEIGHT: 132 LBS | OXYGEN SATURATION: 97 % | RESPIRATION RATE: 18 BRPM | SYSTOLIC BLOOD PRESSURE: 136 MMHG | BODY MASS INDEX: 24.92 KG/M2 | HEIGHT: 61 IN | HEART RATE: 89 BPM

## 2020-11-02 DIAGNOSIS — N64.4 BREAST PAIN: Primary | ICD-10-CM

## 2020-11-02 DIAGNOSIS — T85.9XXA: ICD-10-CM

## 2020-11-02 PROCEDURE — 99283 EMERGENCY DEPT VISIT LOW MDM: CPT | Mod: 25

## 2020-11-03 NOTE — ED TRIAGE NOTES
"Pt presents to ED via personal transportation reports having breast implants and "when I woke up on yesterday my right breast was flat."  Pt reports having breast implants 14 years with no difficulties.  Reports pain and swelling under the right arm, headache and right groin discomfort. Denies CP, SOB, abd pain, back pain or dizziness.   Pain 7/10   "

## 2020-11-03 NOTE — ED PROVIDER NOTES
"Encounter Date: 11/2/2020    SCRIBE #1 NOTE: I, Radha Harmon, am scribing for, and in the presence of,  Jean Ferrell MD. I have scribed the entire note.       History     Chief Complaint   Patient presents with    Breast Problem     Pt reports she has been unable to feel her RIGHT breast implant since yesterday, thinks it is "gone". Has had implants for 14 years. Reports swollen lymphnode under R arm. Pain is 7/10 (under R arm)     This is a 49 y/o female presenting to the ED with a CC of right breast implant complications since yesterday. Patient reports she can't feel her implant, and fears it has leaked. She complains of tenderness under her right arm. Patient rates are pain 7/10. Patient had breast implants inserted in 2007. She also mentions having a mild cough with some shortness of breath, but states this is baseline for her. Current medications include Prozac and Depakote. Patient does not consume alcohol, but does smoke cigarettes. NKDA    The history is provided by the patient.     Review of patient's allergies indicates:   Allergen Reactions    Grass pollen-june grass standard Itching     Past Medical History:   Diagnosis Date    Asthma     Bipolar 1 disorder     Manic depression     Seizures      Past Surgical History:   Procedure Laterality Date    APPENDECTOMY      CARPAL TUNNEL RELEASE      COSMETIC SURGERY      breast implants     HERNIA REPAIR      HYSTERECTOMY      KNEE ARTHROPLASTY Right 6/1/2020    Procedure: ARTHROPLASTY, KNEE;  Surgeon: Vasiliy Ivory MD;  Location: [facility];  Service: Orthopedics;  Laterality: Right;     Family History   Problem Relation Age of Onset    Schizophrenia Mother     Diabetes Father     Heart disease Father      Social History     Tobacco Use    Smoking status: Current Every Day Smoker     Packs/day: 0.50     Years: 30.00     Pack years: 15.00     Types: Cigarettes    Smokeless tobacco: Never Used   Substance Use Topics    Alcohol use: " No    Drug use: Yes     Types: Marijuana     Comment: last smoked day before yesterday     Review of Systems   Constitutional: Negative for chills, diaphoresis, fatigue and fever.   HENT: Negative for congestion.    Respiratory: Positive for cough and shortness of breath.    Cardiovascular: Negative for chest pain and leg swelling.   Gastrointestinal: Negative for abdominal pain, diarrhea, nausea and vomiting.   Genitourinary: Negative for dysuria.   Musculoskeletal: Negative for back pain.        Positive for right arm pain  Positive for right breast implant abnormality   Skin: Negative for rash and wound.   Neurological: Negative for dizziness, weakness, numbness and headaches.   Psychiatric/Behavioral: Negative for confusion.       Physical Exam     Initial Vitals [11/02/20 1851]   BP Pulse Resp Temp SpO2   135/80 98 18 98.2 °F (36.8 °C) 96 %      MAP       --         Physical Exam    Nursing note and vitals reviewed.  Constitutional: She appears well-developed and well-nourished. She is not diaphoretic. No distress.   HENT:   Head: Normocephalic and atraumatic.   Nose: Nose normal.   Eyes: EOM are normal. Pupils are equal, round, and reactive to light. No scleral icterus.   Neck: Normal range of motion. Neck supple.   Cardiovascular: Normal rate, regular rhythm, normal heart sounds and intact distal pulses. Exam reveals no gallop and no friction rub.    No murmur heard.  Pulmonary/Chest: Breath sounds normal. No stridor. No respiratory distress. She has no wheezes. She has no rhonchi. She has no rales. She exhibits tenderness.   Asymmetrical breasts, consistent with deflation of right breast implant.  Mild tenderness to palpation of the right axilla.  Normal range of motion of the right arm.   Abdominal: Soft. Normal appearance and bowel sounds are normal. She exhibits no distension. There is no abdominal tenderness. There is no rebound and no guarding.   Musculoskeletal: Normal range of motion. No tenderness  or edema.   Neurological: She is oriented to person, place, and time. No cranial nerve deficit.   Skin: Skin is warm and dry. No rash noted.   Psychiatric: She has a normal mood and affect. Her behavior is normal.         ED Course   Procedures  Labs Reviewed - No data to display       Imaging Results    None          Medical Decision Making:   Initial Assessment:   50-year-old female with history of breast augmentation presenting with right breast pain and asymmetry.  Patient believes her breast implant has deflated.  There is asymmetry on exam.  There is mild tenderness to palpation of the right axilla.  Implant types were saline.  She denies fevers, chills, shortness of breath.  Exam otherwise unremarkable.  Chest x-ray unremarkable.  Believe she is safe for discharge home.  Patient has follow-up scheduled with her breast/plastic surgeon.  Independently Interpreted Test(s):   I have ordered and independently interpreted X-rays - see prior notes.  Clinical Tests:   Radiological Study: Ordered and Reviewed            Scribe Attestation:   Scribe #1: I performed the above scribed service and the documentation accurately describes the services I performed. I attest to the accuracy of the note.                      Clinical Impression:       ICD-10-CM ICD-9-CM   1. Disorder of breast implant  T85.9XXA 996.54                      Disposition:   Disposition: Discharged  Condition: Stable     I, Jean Ferrell, personally performed the services described in this documentation. All medical record entries made by the scribe were at my direction and in my presence.  I have reviewed the chart and agree that the record reflects my personal performance and is accurate and complete.                        Jean Ferrell MD  11/02/20 2046

## 2020-11-03 NOTE — ED NOTES
Pt ambulated with strong steady gait, in no acute distress, AAO x4, verbalized understanding of d/c instructions.

## 2020-11-11 ENCOUNTER — HOSPITAL ENCOUNTER (EMERGENCY)
Facility: HOSPITAL | Age: 50
Discharge: HOME | End: 2020-11-11
Attending: EMERGENCY MEDICINE
Payer: MEDICAID

## 2020-11-11 VITALS
SYSTOLIC BLOOD PRESSURE: 140 MMHG | TEMPERATURE: 99 F | HEIGHT: 61 IN | DIASTOLIC BLOOD PRESSURE: 71 MMHG | RESPIRATION RATE: 18 BRPM | WEIGHT: 132 LBS | BODY MASS INDEX: 24.92 KG/M2 | HEART RATE: 87 BPM | OXYGEN SATURATION: 98 %

## 2020-11-11 DIAGNOSIS — N63.0 BREAST MASS: Primary | ICD-10-CM

## 2020-11-11 DIAGNOSIS — N64.4 BREAST PAIN: ICD-10-CM

## 2020-11-11 PROCEDURE — 25000003 PHARM REV CODE 250: Performed by: PHYSICIAN ASSISTANT

## 2020-11-11 PROCEDURE — 99284 EMERGENCY DEPT VISIT MOD MDM: CPT

## 2020-11-11 RX ORDER — HYDROXYZINE PAMOATE 25 MG/1
50 CAPSULE ORAL
Status: COMPLETED | OUTPATIENT
Start: 2020-11-11 | End: 2020-11-11

## 2020-11-11 RX ORDER — FLUOXETINE HYDROCHLORIDE 20 MG/1
40 CAPSULE ORAL DAILY
COMMUNITY

## 2020-11-11 RX ORDER — IBUPROFEN 600 MG/1
600 TABLET ORAL EVERY 6 HOURS PRN
Qty: 20 TABLET | Refills: 0 | Status: SHIPPED | OUTPATIENT
Start: 2020-11-11 | End: 2020-11-16

## 2020-11-11 RX ORDER — OXYCODONE AND ACETAMINOPHEN 5; 325 MG/1; MG/1
1 TABLET ORAL EVERY 6 HOURS PRN
Qty: 12 TABLET | Refills: 0 | Status: SHIPPED | OUTPATIENT
Start: 2020-11-11 | End: 2020-11-14

## 2020-11-11 RX ORDER — OXYCODONE AND ACETAMINOPHEN 5; 325 MG/1; MG/1
1 TABLET ORAL
Status: COMPLETED | OUTPATIENT
Start: 2020-11-11 | End: 2020-11-11

## 2020-11-11 RX ADMIN — OXYCODONE HYDROCHLORIDE AND ACETAMINOPHEN 1 TABLET: 5; 325 TABLET ORAL at 07:11

## 2020-11-11 RX ADMIN — HYDROXYZINE PAMOATE 50 MG: 25 CAPSULE ORAL at 07:11

## 2020-11-12 NOTE — ED PROVIDER NOTES
"Encounter Date: 11/11/2020       History     Chief Complaint   Patient presents with    Breast Pain     Pt reports left breast pain. Pt reports having left breast drained yesterday. Pt states, "I can see the knot." Breast Implants (2007). Pt reports right breast ruptured 2 weeks ago.     Chief complaint:  Breast pain    HPI:  50-year-old female history of manic depression, bipolar 1 disorder, seizures, asthma presenting for evaluation of painful breast mass x 1 day. Pt had breast implant surgery in 2007 with Dr. Aguilar, Plastic Surgeon.  She was evaluated this facility 2 weeks ago after rupture of the right breast implant.  She went to have drainage of the left breast implant yesterday with her surgeon.  She states she developed pain following the procedure.  She had saline implants.  She states that they or recall due to possible link to lymphoma. Has had intermittent chills today. Family history of breast cancer in mother and maternal grandmother. States she has never had a mammogram because she was under the impression she did not need to get one due to breast implants. She has next appointment with her plastic surgeon   Denies fever, nausea vomiting, breast redness or swelling, nipple discharge or other complaints at this time.          Review of patient's allergies indicates:   Allergen Reactions    Grass pollen-june grass standard Itching     Past Medical History:   Diagnosis Date    Asthma     Bipolar 1 disorder     Manic depression     Seizures      Past Surgical History:   Procedure Laterality Date    APPENDECTOMY      CARPAL TUNNEL RELEASE      COSMETIC SURGERY      breast implants     HERNIA REPAIR      HYSTERECTOMY      INSERTION OF BREAST IMPLANT      KNEE ARTHROPLASTY Right 6/1/2020    Procedure: ARTHROPLASTY, KNEE;  Surgeon: Vasiliy Ivory MD;  Location: [facility];  Service: Orthopedics;  Laterality: Right;     Family History   Problem Relation Age of Onset    Schizophrenia Mother     " Diabetes Father     Heart disease Father      Social History     Tobacco Use    Smoking status: Current Every Day Smoker     Packs/day: 0.50     Years: 30.00     Pack years: 15.00     Types: Cigarettes    Smokeless tobacco: Never Used   Substance Use Topics    Alcohol use: No    Drug use: Yes     Types: Marijuana     Comment: last smoked day before yesterday     Review of Systems   Constitutional: Positive for chills. Negative for fever.   HENT: Negative for congestion, ear pain, nosebleeds, rhinorrhea, sore throat and trouble swallowing.    Eyes: Negative for redness.   Respiratory: Negative for cough, shortness of breath and stridor.    Cardiovascular: Negative for chest pain and leg swelling.   Gastrointestinal: Negative for abdominal pain, constipation, diarrhea, nausea and vomiting.   Genitourinary: Negative for decreased urine volume, dysuria, frequency, hematuria and urgency.   Musculoskeletal: Negative for back pain and neck pain.   Skin: Negative for rash and wound.   Neurological: Negative for dizziness, speech difficulty, weakness, light-headedness, numbness and headaches.   Psychiatric/Behavioral: Negative for confusion.       Physical Exam     Initial Vitals [11/11/20 1822]   BP Pulse Resp Temp SpO2   (!) 157/77 103 20 98.5 °F (36.9 °C) 98 %      MAP       --         Physical Exam    Nursing note and vitals reviewed.  Constitutional: She appears well-developed and well-nourished.   HENT:   Head: Normocephalic.   Right Ear: External ear normal.   Left Ear: External ear normal.   Nose: Nose normal.   Eyes: Conjunctivae are normal.   Cardiovascular: Normal rate and regular rhythm. Exam reveals no gallop and no friction rub.    No murmur heard.  Pulmonary/Chest: Breath sounds normal. No respiratory distress. She has no decreased breath sounds. She has no wheezes. She has no rhonchi. She has no rales.   Right breast exhibits no inverted nipple, no mass, no nipple discharge, no skin change and no  tenderness. Left breast exhibits mass and tenderness. Left breast exhibits no inverted nipple, no nipple discharge and no skin change. No breast swelling.   2 areas of bruising to left lateral breast      Abdominal: There is no tenderness at McBurney's point and negative Domínguez's sign.   Musculoskeletal: Normal range of motion.   Neurological: She is alert. She has normal strength. No cranial nerve deficit or sensory deficit.   Skin: Skin is warm and dry.   Psychiatric: She has a normal mood and affect.         ED Course   Procedures  Labs Reviewed - No data to display       Imaging Results    None          Medical Decision Making:   Initial Assessment:   50-year-old female presenting evaluation of left breast pain after drainage of left breast implant performed yesterday at her plastic surgeon's office.  She denies any fever redness swelling, nausea or vomiting.  She reports she has family history of breast cancer in her mother and maternal grandmother.  Has not had a mammogram.  Exam above.  Do not feel like this is an abscess. No overlying cellulitis. Will cover with keflex for possible developing abscess. May be hematoma from procedure, the implant, malignancy among others.will refer patient to [facility] for mammogram and have her follow up with her surgeon. Return to ER forw orsneing symptoms ro as needed. Discussed pt with Dr. Aldana who agrees with assessment and plan.                              Clinical Impression:       ICD-10-CM ICD-9-CM   1. Breast mass  N63.0 611.72   2. Breast pain  N64.4 611.71                          ED Disposition Condition    Discharge Stable        ED Prescriptions     Medication Sig Dispense Start Date End Date Auth. Provider    oxyCODONE-acetaminophen (PERCOCET) 5-325 mg per tablet Take 1 tablet by mouth every 6 (six) hours as needed for Pain. 12 tablet 11/11/2020 11/14/2020 Mouna Ma PA-C    ibuprofen (ADVIL,MOTRIN) 600 MG tablet Take 1 tablet (600 mg  total) by mouth every 6 (six) hours as needed for Pain. 20 tablet 11/11/2020 11/16/2020 Mouna Ma PA-C        Follow-up Information     Follow up With Specialties Details Why Contact [facility]  Schedule an appointment as soon as possible for a visit in 2 days for follow up [address] [address]  [phone]      Approved Provider By Your Insurance Company  Schedule an appointment as soon as possible for a visit in 2 days Call number on your insurance card to receive a full list of providers in your area                                        Mouna Ma PA-C  11/11/20 0460

## 2020-11-12 NOTE — FIRST PROVIDER EVALUATION
" Emergency Department TeleTriage Encounter Note      CHIEF COMPLAINT    Chief Complaint   Patient presents with    Breast Pain     Pt reports left breast pain. Pt reports having left breast drained yesterday. Pt states, "I can see the knot." Breast Implants (2007). Pt reports right breast ruptured 2 weeks ago.       VITAL SIGNS   Initial Vitals [11/11/20 1822]   BP Pulse Resp Temp SpO2   (!) 157/77 103 20 98.5 °F (36.9 °C) 98 %      MAP       --            ALLERGIES    Review of patient's allergies indicates:   Allergen Reactions    Grass pollen-june grass standard Itching       PROVIDER TRIAGE NOTE  This is a teletriage evaluation of a 50 y.o. female presenting to the ED with c/o left breast pain.  Pt saw plastic surgeon yesterday and he drained the left breast.  Pt states since that time she has a "knot" that has become painful and draining.       Initial orders will be placed and care will be transferred to an alternate provider when patient is roomed for a full evaluation. Any additional orders and the final disposition will be determined by that provider.         ORDERS  Labs Reviewed - No data to display    ED Orders (720h ago, onward)    None            Virtual Visit Note: The provider triage portion of this emergency department evaluation and documentation was performed via [employer], a HIPAA-compliant telemedicine application, in concert with a tele-presenter in the room. A face to face patient evaluation with one of my colleagues will occur once the patient is placed in an emergency department room.      DISCLAIMER: This note was prepared with [employer] voice recognition transcription software. Garbled syntax, mangled pronouns, and other bizarre constructions may be attributed to that software system.  "

## 2020-11-12 NOTE — DISCHARGE INSTRUCTIONS
Take Percocet as needed for pain. Follow up with breast surgery / OBGYN in 1-2 days. Contact your surgeon tomorrow regarding breast mass. Return to ER for fever, worsening pain, redness, swelling or as needed

## 2021-04-13 ENCOUNTER — HOSPITAL ENCOUNTER (EMERGENCY)
Facility: OTHER | Age: 51
Discharge: HOME | End: 2021-04-13
Attending: EMERGENCY MEDICINE
Payer: MEDICAID

## 2021-04-13 VITALS
TEMPERATURE: 99 F | HEART RATE: 89 BPM | HEIGHT: 60 IN | RESPIRATION RATE: 19 BRPM | SYSTOLIC BLOOD PRESSURE: 121 MMHG | WEIGHT: 124 LBS | DIASTOLIC BLOOD PRESSURE: 71 MMHG | OXYGEN SATURATION: 99 % | BODY MASS INDEX: 24.35 KG/M2

## 2021-04-13 DIAGNOSIS — T85.49XA DEFLATION OF BREAST IMPLANT, INITIAL ENCOUNTER: ICD-10-CM

## 2021-04-13 DIAGNOSIS — N64.4 BREAST PAIN, LEFT: Primary | ICD-10-CM

## 2021-04-13 LAB
ALBUMIN SERPL BCP-MCNC: 3.7 G/DL (ref 3.5–5.2)
ALP SERPL-CCNC: 115 U/L (ref 55–135)
ALT SERPL W/O P-5'-P-CCNC: 23 U/L (ref 10–44)
ANION GAP SERPL CALC-SCNC: 14 MMOL/L (ref 8–16)
AST SERPL-CCNC: 42 U/L (ref 10–40)
BASOPHILS # BLD AUTO: 0.06 K/UL (ref 0–0.2)
BASOPHILS NFR BLD: 1 % (ref 0–1.9)
BILIRUB SERPL-MCNC: 0.5 MG/DL (ref 0.1–1)
BUN SERPL-MCNC: 21 MG/DL (ref 6–20)
CALCIUM SERPL-MCNC: 8.8 MG/DL (ref 8.7–10.5)
CHLORIDE SERPL-SCNC: 105 MMOL/L (ref 95–110)
CO2 SERPL-SCNC: 21 MMOL/L (ref 23–29)
CREAT SERPL-MCNC: 0.8 MG/DL (ref 0.5–1.4)
DIFFERENTIAL METHOD: ABNORMAL
EOSINOPHIL # BLD AUTO: 0.1 K/UL (ref 0–0.5)
EOSINOPHIL NFR BLD: 1.5 % (ref 0–8)
ERYTHROCYTE [DISTWIDTH] IN BLOOD BY AUTOMATED COUNT: 12.9 % (ref 11.5–14.5)
EST. GFR  (AFRICAN AMERICAN): >60 ML/MIN/1.73 M^2
EST. GFR  (NON AFRICAN AMERICAN): >60 ML/MIN/1.73 M^2
GLUCOSE SERPL-MCNC: 99 MG/DL (ref 70–110)
HCT VFR BLD AUTO: 43 % (ref 37–48.5)
HGB BLD-MCNC: 14.8 G/DL (ref 12–16)
IMM GRANULOCYTES # BLD AUTO: 0.02 K/UL (ref 0–0.04)
IMM GRANULOCYTES NFR BLD AUTO: 0.3 % (ref 0–0.5)
LACTATE SERPL-SCNC: 0.8 MMOL/L (ref 0.5–2.2)
LYMPHOCYTES # BLD AUTO: 1.9 K/UL (ref 1–4.8)
LYMPHOCYTES NFR BLD: 31.4 % (ref 18–48)
MCH RBC QN AUTO: 31.1 PG (ref 27–31)
MCHC RBC AUTO-ENTMCNC: 34.4 G/DL (ref 32–36)
MCV RBC AUTO: 90 FL (ref 82–98)
MONOCYTES # BLD AUTO: 0.5 K/UL (ref 0.3–1)
MONOCYTES NFR BLD: 7.8 % (ref 4–15)
NEUTROPHILS # BLD AUTO: 3.6 K/UL (ref 1.8–7.7)
NEUTROPHILS NFR BLD: 58 % (ref 38–73)
NRBC BLD-RTO: 0 /100 WBC
PLATELET # BLD AUTO: 255 K/UL (ref 150–450)
PMV BLD AUTO: 8.9 FL (ref 9.2–12.9)
POTASSIUM SERPL-SCNC: 3.8 MMOL/L (ref 3.5–5.1)
PROT SERPL-MCNC: 7.1 G/DL (ref 6–8.4)
RBC # BLD AUTO: 4.76 M/UL (ref 4–5.4)
SODIUM SERPL-SCNC: 140 MMOL/L (ref 136–145)
WBC # BLD AUTO: 6.14 K/UL (ref 3.9–12.7)

## 2021-04-13 PROCEDURE — 63600175 PHARM REV CODE 636 W HCPCS: Performed by: NURSE PRACTITIONER

## 2021-04-13 PROCEDURE — 25000003 PHARM REV CODE 250: Performed by: NURSE PRACTITIONER

## 2021-04-13 PROCEDURE — 96374 THER/PROPH/DIAG INJ IV PUSH: CPT

## 2021-04-13 PROCEDURE — 83605 ASSAY OF LACTIC ACID: CPT | Performed by: NURSE PRACTITIONER

## 2021-04-13 PROCEDURE — 99284 EMERGENCY DEPT VISIT MOD MDM: CPT | Mod: 25

## 2021-04-13 PROCEDURE — 85025 COMPLETE CBC W/AUTO DIFF WBC: CPT | Performed by: NURSE PRACTITIONER

## 2021-04-13 PROCEDURE — 80053 COMPREHEN METABOLIC PANEL: CPT | Performed by: NURSE PRACTITIONER

## 2021-04-13 PROCEDURE — 96361 HYDRATE IV INFUSION ADD-ON: CPT

## 2021-04-13 RX ORDER — HYDROCODONE BITARTRATE AND ACETAMINOPHEN 5; 325 MG/1; MG/1
1 TABLET ORAL EVERY 6 HOURS PRN
Qty: 12 TABLET | Refills: 0 | Status: SHIPPED | OUTPATIENT
Start: 2021-04-13 | End: 2021-04-16

## 2021-04-13 RX ORDER — HYDROCODONE BITARTRATE AND ACETAMINOPHEN 5; 325 MG/1; MG/1
1 TABLET ORAL
Status: COMPLETED | OUTPATIENT
Start: 2021-04-13 | End: 2021-04-13

## 2021-04-13 RX ORDER — KETOROLAC TROMETHAMINE 30 MG/ML
15 INJECTION, SOLUTION INTRAMUSCULAR; INTRAVENOUS
Status: COMPLETED | OUTPATIENT
Start: 2021-04-13 | End: 2021-04-13

## 2021-04-13 RX ORDER — IBUPROFEN 600 MG/1
600 TABLET ORAL EVERY 6 HOURS PRN
Qty: 20 TABLET | Refills: 0 | OUTPATIENT
Start: 2021-04-13 | End: 2023-06-14

## 2021-04-13 RX ADMIN — KETOROLAC TROMETHAMINE 15 MG: 30 INJECTION, SOLUTION INTRAMUSCULAR; INTRAVENOUS at 04:04

## 2021-04-13 RX ADMIN — HYDROCODONE BITARTRATE AND ACETAMINOPHEN 1 TABLET: 5; 325 TABLET ORAL at 06:04

## 2021-04-13 RX ADMIN — SODIUM CHLORIDE 1000 ML: 0.9 INJECTION, SOLUTION INTRAVENOUS at 04:04

## 2021-04-16 ENCOUNTER — PATIENT MESSAGE (OUTPATIENT)
Dept: RESEARCH | Facility: HOSPITAL | Age: 51
End: 2021-04-16

## 2021-12-18 ENCOUNTER — HOSPITAL ENCOUNTER (EMERGENCY)
Facility: HOSPITAL | Age: 51
Discharge: HOME | End: 2021-12-19
Attending: EMERGENCY MEDICINE
Payer: MEDICAID
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
## 2021-12-18 DIAGNOSIS — M79.641 RIGHT HAND PAIN: Primary | ICD-10-CM

## 2021-12-18 DIAGNOSIS — M25.541 ARTHRALGIA OF METACARPOPHALANGEAL JOINT, RIGHT: ICD-10-CM

## 2021-12-18 DIAGNOSIS — M19.041 ARTHRITIS OF RIGHT HAND: ICD-10-CM

## 2021-12-18 PROCEDURE — 96372 THER/PROPH/DIAG INJ SC/IM: CPT

## 2021-12-18 PROCEDURE — 99284 EMERGENCY DEPT VISIT MOD MDM: CPT | Mod: 25

## 2021-12-18 PROCEDURE — 63600175 PHARM REV CODE 636 W HCPCS: Performed by: EMERGENCY MEDICINE

## 2021-12-18 RX ORDER — KETOROLAC TROMETHAMINE 30 MG/ML
60 INJECTION, SOLUTION INTRAMUSCULAR; INTRAVENOUS
Status: COMPLETED | OUTPATIENT
Start: 2021-12-18 | End: 2021-12-18

## 2021-12-18 RX ADMIN — KETOROLAC TROMETHAMINE 60 MG: 30 INJECTION, SOLUTION INTRAMUSCULAR at 10:12

## 2021-12-19 VITALS
WEIGHT: 124 LBS | BODY MASS INDEX: 24.35 KG/M2 | OXYGEN SATURATION: 97 % | RESPIRATION RATE: 20 BRPM | SYSTOLIC BLOOD PRESSURE: 111 MMHG | HEIGHT: 60 IN | HEART RATE: 80 BPM | TEMPERATURE: 98 F | DIASTOLIC BLOOD PRESSURE: 67 MMHG

## 2021-12-19 RX ORDER — KETOROLAC TROMETHAMINE 10 MG/1
10 TABLET, FILM COATED ORAL EVERY 6 HOURS PRN
Qty: 20 TABLET | Refills: 1 | Status: SHIPPED | OUTPATIENT
Start: 2021-12-19

## 2022-12-08 ENCOUNTER — HOSPITAL ENCOUNTER (EMERGENCY)
Facility: HOSPITAL | Age: 52
Discharge: HOME | End: 2022-12-08
Attending: INTERNAL MEDICINE
Payer: MEDICAID

## 2022-12-08 VITALS
OXYGEN SATURATION: 95 % | TEMPERATURE: 99 F | SYSTOLIC BLOOD PRESSURE: 93 MMHG | HEART RATE: 89 BPM | RESPIRATION RATE: 18 BRPM | HEIGHT: 59 IN | BODY MASS INDEX: 25.6 KG/M2 | DIASTOLIC BLOOD PRESSURE: 54 MMHG | WEIGHT: 127 LBS

## 2022-12-08 DIAGNOSIS — J44.1 COPD EXACERBATION: Primary | ICD-10-CM

## 2022-12-08 DIAGNOSIS — F17.210 CONTINUOUS DEPENDENCE ON CIGARETTE SMOKING: ICD-10-CM

## 2022-12-08 DIAGNOSIS — R06.02 SOB (SHORTNESS OF BREATH): ICD-10-CM

## 2022-12-08 LAB
CTP QC/QA: YES
POC MOLECULAR INFLUENZA A AGN: NEGATIVE
POC MOLECULAR INFLUENZA B AGN: NEGATIVE

## 2022-12-08 PROCEDURE — 63600175 PHARM REV CODE 636 W HCPCS: Performed by: INTERNAL MEDICINE

## 2022-12-08 PROCEDURE — 87502 INFLUENZA DNA AMP PROBE: CPT

## 2022-12-08 PROCEDURE — 25000003 PHARM REV CODE 250: Performed by: INTERNAL MEDICINE

## 2022-12-08 PROCEDURE — 99284 EMERGENCY DEPT VISIT MOD MDM: CPT

## 2022-12-08 RX ORDER — PREDNISONE 20 MG/1
40 TABLET ORAL DAILY
Qty: 10 TABLET | Refills: 0 | Status: SHIPPED | OUTPATIENT
Start: 2022-12-08 | End: 2022-12-13

## 2022-12-08 RX ORDER — DOXYCYCLINE HYCLATE 100 MG
100 TABLET ORAL
Status: COMPLETED | OUTPATIENT
Start: 2022-12-08 | End: 2022-12-08

## 2022-12-08 RX ORDER — PREDNISONE 20 MG/1
60 TABLET ORAL
Status: COMPLETED | OUTPATIENT
Start: 2022-12-08 | End: 2022-12-08

## 2022-12-08 RX ORDER — DOXYCYCLINE 100 MG/1
100 CAPSULE ORAL 2 TIMES DAILY
Qty: 20 CAPSULE | Refills: 0 | Status: SHIPPED | OUTPATIENT
Start: 2022-12-08 | End: 2022-12-18

## 2022-12-08 RX ADMIN — PREDNISONE 60 MG: 20 TABLET ORAL at 11:12

## 2022-12-08 RX ADMIN — DOXYCYCLINE HYCLATE 100 MG: 100 TABLET, COATED ORAL at 11:12

## 2022-12-09 NOTE — ED PROVIDER NOTES
Encounter Date: 12/8/2022    SCRIBE #1 NOTE: I, Evonne Villalta, am scribing for, and in the presence of,  Dusty Figueroa MD. I have scribed the following portions of the note - Other sections scribed: HPI, ROS, PE.     History     Chief Complaint   Patient presents with    Flu Like Symptoms     Pt c/o cough, SOB (worse with ambulation), body aches, & fatigue x3 days; pt reports hx of COPD and has been using her nebulizer treatments 4x's a day with temporary relief; pt reports negative home Covid test;     Shortness of Breath     52 y.o. female with PMHx of Asthma, COPD, and others who presents to the ED for chief complaint of cough, SOB, generalized myalgias, and fatigue for 3 days. Patient endorses exacerbation of symptoms with ambulation. She has been using nebulizer with temporary relief of symptoms. Negative at-home COVID test. Denies other associated symptoms at this time.    The history is provided by the patient. No  was used.   Review of patient's allergies indicates:   Allergen Reactions    Grass pollen-june grass standard Itching     Past Medical History:   Diagnosis Date    Asthma     Bipolar 1 disorder     Manic depression     Seizures      Past Surgical History:   Procedure Laterality Date    APPENDECTOMY      CARPAL TUNNEL RELEASE      COSMETIC SURGERY      breast implants     HERNIA REPAIR      HYSTERECTOMY      INSERTION OF BREAST IMPLANT      KNEE ARTHROPLASTY Right 6/1/2020    Procedure: ARTHROPLASTY, KNEE;  Surgeon: Vasiliy Ivory MD;  Location: [facility];  Service: Orthopedics;  Laterality: Right;     Family History   Problem Relation Age of Onset    Schizophrenia Mother     Diabetes Father     Heart disease Father      Social History     Tobacco Use    Smoking status: Every Day     Packs/day: 0.50     Years: 30.00     Pack years: 15.00     Types: Cigarettes    Smokeless tobacco: Never   Substance Use Topics    Alcohol use: No    Drug use: Yes     Types: Marijuana     Comment:  last smoked day before yesterday     Review of Systems   Constitutional:  Positive for fatigue. Negative for fever.   HENT:  Negative for sore throat.    Respiratory:  Positive for cough and shortness of breath.    Cardiovascular:  Negative for chest pain.   Gastrointestinal:  Negative for abdominal pain, diarrhea, nausea and vomiting.   Genitourinary:  Negative for dysuria.   Musculoskeletal:  Positive for myalgias. Negative for back pain.   Skin:  Negative for rash.   Neurological:  Negative for weakness and headaches.   Psychiatric/Behavioral:  Negative for behavioral problems.    All other systems reviewed and are negative.    Physical Exam     Initial Vitals [12/08/22 2148]   BP Pulse Resp Temp SpO2   138/71 93 20 98.6 °F (37 °C) 97 %      MAP       --         Physical Exam    Nursing note and vitals reviewed.  Constitutional: She appears well-developed and well-nourished.   HENT:   Head: Normocephalic and atraumatic.   Enlarged nasal turbinates noted. Clear nasal discharge noted. Oropharyngeal erythema present. No oropharyngeal exudate or edema.    Eyes: Conjunctivae are normal.   Neck: Neck supple.   Normal range of motion.  Cardiovascular:  Normal rate, regular rhythm and normal heart sounds.     Exam reveals no gallop and no friction rub.       No murmur heard.  Pulmonary/Chest: Breath sounds normal. No respiratory distress. She has no wheezes. She has no rhonchi. She has no rales.   Abdominal: Abdomen is soft. There is no abdominal tenderness.   Musculoskeletal:         General: No edema. Normal range of motion.      Cervical back: Normal range of motion and neck supple.     Neurological: She is alert and oriented to person, place, and time. GCS score is 15. GCS eye subscore is 4. GCS verbal subscore is 5. GCS motor subscore is 6.   Skin: Skin is warm and dry.   Psychiatric: She has a normal mood and affect.       ED Course   Procedures  Labs Reviewed   POCT INFLUENZA A/B MOLECULAR - Normal           Imaging Results    None          Medications   doxycycline tablet 100 mg (100 mg Oral Given 12/8/22 2330)   predniSONE tablet 60 mg (60 mg Oral Given 12/8/22 2330)     Medical Decision Making:   History:   Old Medical Records: I decided to obtain old medical records.  Initial Assessment:   52 y.o. female with PMHx of Asthma, COPD, and others who presents to the ED for chief complaint of cough, SOB, generalized myalgias, and fatigue for 3 days. Patient endorses exacerbation of symptoms with ambulation. She has been using nebulizer with temporary relief of symptoms. Negative at-home COVID test. Denies other associated symptoms at this time.  Clinical Tests:   Lab Tests: Ordered and Reviewed  ED Management:  Patient exhibited no signs or symptoms of respiratory distress throughout ED stay.  O2 sats were normal on room air and patient had a negative rapid flu test.  She received doxycycline/prednisone in the emergency department secondary to history of COPD and reported symptoms at home.  She also received prescriptions for each as well as instructions to follow-up with her primary care physician within the next week for re-evaluation/return to the emergency department if condition worsens.        Scribe Attestation:   Scribe #1: I performed the above scribed service and the documentation accurately describes the services I performed. I attest to the accuracy of the note.                 This document was produced by a scribe under my direction and in my presence. I agree with the content of the note and have made any necessary edits.     Dr. Figueroa    12/09/2022 4:48 AM    Clinical Impression:   Final diagnoses:  [R06.02] SOB (shortness of breath)  [J44.1] COPD exacerbation (Primary)  [F17.210] Continuous dependence on cigarette smoking        ED Disposition Condition    Discharge Stable          ED Prescriptions       Medication Sig Dispense Start Date End Date Auth. Provider    doxycycline (VIBRAMYCIN) 100 MG Cap  Take 1 capsule (100 mg total) by mouth 2 (two) times daily. for 10 days 20 capsule 12/8/2022 12/18/2022 Dusty Figueroa MD    predniSONE (DELTASONE) 20 MG tablet Take 2 tablets (40 mg total) by mouth once daily. for 5 days 10 tablet 12/8/2022 12/13/2022 Dusty Figueroa MD          Follow-up Information       Follow up With Specialties Details Why Contact [facility]  Schedule an appointment as soon as possible for a visit in 1 day For reevaluation [address] [address]  [phone]               Dusty Figueroa MD  12/09/22 5691

## 2022-12-09 NOTE — ED NOTES
Patient noted to have urinated on self. Flavia at bedside to assist in discharge of patient. Patient states would like paper scrub pants to change into upon walking out of ER. Patient laid back down and was asked to get up and change into scrub pants. Patient became belligerent and started to yell at everyone. Security was called to escort patient out. Discharge paperwork was given to patient and explained in depth. Patient snatched paperwork out of Flavia's hand and proceeded to walk out of dept with steady gait and continuing to yell at staff.

## 2023-04-30 ENCOUNTER — HOSPITAL ENCOUNTER (EMERGENCY)
Facility: HOSPITAL | Age: 53
Discharge: HOME | End: 2023-04-30
Attending: EMERGENCY MEDICINE
Payer: MEDICAID

## 2023-04-30 VITALS
TEMPERATURE: 98 F | BODY MASS INDEX: 25.13 KG/M2 | OXYGEN SATURATION: 99 % | RESPIRATION RATE: 18 BRPM | HEART RATE: 87 BPM | DIASTOLIC BLOOD PRESSURE: 79 MMHG | WEIGHT: 128 LBS | HEIGHT: 60 IN | SYSTOLIC BLOOD PRESSURE: 127 MMHG

## 2023-04-30 DIAGNOSIS — H60.12 CELLULITIS OF LEFT EAR: ICD-10-CM

## 2023-04-30 DIAGNOSIS — R21 RASH AND NONSPECIFIC SKIN ERUPTION: Primary | ICD-10-CM

## 2023-04-30 PROCEDURE — 99283 EMERGENCY DEPT VISIT LOW MDM: CPT

## 2023-04-30 RX ORDER — SULFAMETHOXAZOLE AND TRIMETHOPRIM 800; 160 MG/1; MG/1
1 TABLET ORAL 2 TIMES DAILY
Qty: 10 TABLET | Refills: 0 | Status: SHIPPED | OUTPATIENT
Start: 2023-04-30 | End: 2023-05-05

## 2023-05-01 NOTE — DISCHARGE INSTRUCTIONS
You were seen in the emergency department for pain on the outside of your ear from a skin rash.  We have given you prescription for an antibiotic.  Please take it as directed.  Please return for any new or worsening concerns.

## 2023-05-01 NOTE — ED PROVIDER NOTES
Encounter Date: 4/30/2023       History     Chief Complaint   Patient presents with    Rash     States fell in a bush  3 weeks ago and now has small red bumps to L forearm. + itching. Pt states no relief with OTC meds      52-year-old female presenting with skin rash to left side of body, face, ear.  Patient notes she fell into a bush containing poison ivy 2 weeks ago.  Notes skin rash on left arm and face has mostly improved though still slightly irritated.  Reports it is improved with lidocaine cream.  She does note a lesion of her left outer ear has become more red, warm, tender and painful.  She denies fevers, chills, nausea, vomiting.  Previously in usual state of health.    Review of patient's allergies indicates:   Allergen Reactions    Grass pollen-june grass standard Itching     Past Medical History:   Diagnosis Date    Asthma     Bipolar 1 disorder     Manic depression     Seizures      Past Surgical History:   Procedure Laterality Date    APPENDECTOMY      CARPAL TUNNEL RELEASE      COSMETIC SURGERY      breast implants     HERNIA REPAIR      HYSTERECTOMY      INSERTION OF BREAST IMPLANT      KNEE ARTHROPLASTY Right 6/1/2020    Procedure: ARTHROPLASTY, KNEE;  Surgeon: Vasiliy Ivory MD;  Location: [facility];  Service: Orthopedics;  Laterality: Right;     Family History   Problem Relation Age of Onset    Schizophrenia Mother     Diabetes Father     Heart disease Father      Social History     Tobacco Use    Smoking status: Every Day     Packs/day: 0.50     Years: 30.00     Pack years: 15.00     Types: Cigarettes    Smokeless tobacco: Never   Substance Use Topics    Alcohol use: No    Drug use: Yes     Types: Marijuana     Comment: last smoked day before yesterday     Review of Systems   Constitutional:  Negative for chills and fever.   Skin:  Positive for color change, rash and wound.     Physical Exam     Initial Vitals [04/30/23 2041]   BP Pulse Resp Temp SpO2   129/82 98 16 97.9 °F (36.6 °C) 100 %       MAP       --         Physical Exam    Nursing note and vitals reviewed.  Constitutional: She appears well-developed and well-nourished. She is not diaphoretic. No distress.   HENT:   Head: Normocephalic and atraumatic.   Right Ear: External ear normal.   Left Ear: External ear normal.   Mouth/Throat: No oropharyngeal exudate.   Eyes: EOM are normal. Pupils are equal, round, and reactive to light. Right eye exhibits no discharge. Left eye exhibits no discharge.   Neck: No JVD present.   Cardiovascular:  Normal rate and intact distal pulses.           Pulmonary/Chest: No respiratory distress.   Abdominal: She exhibits no distension. There is no guarding.   Musculoskeletal:         General: No edema.     Neurological: She is alert and oriented to person, place, and time. GCS score is 15. GCS eye subscore is 4. GCS verbal subscore is 5. GCS motor subscore is 6.   Skin: Skin is warm and dry. Rash noted.   Well-healing contact dermatitis to left arm, left lateral nose, left ear.  Left ear with redness, warmth, tenderness and mild cellulitis   Psychiatric: She has a normal mood and affect. Her behavior is normal.       ED Course   Procedures  Labs Reviewed - No data to display       Imaging Results    None          Medications - No data to display  Medical Decision Making:   Initial Assessment:   52-year-old female presenting with painful left outer ear.  Mild cellulitis.  Area centered around prior poison ivy lesion.  Poison ivy lesions appear to be healing well.  Suspect mild cellulitis of the pinna.  Will treat with Bactrim.  Discussed return precautions.  Believe she is safe for discharge home.                        Clinical Impression:   Final diagnoses:  [R21] Rash and nonspecific skin eruption (Primary)  [H60.12] Cellulitis of left ear        ED Disposition Condition    Discharge Stable          ED Prescriptions       Medication Sig Dispense Start Date End Date Auth. Provider    sulfamethoxazole-trimethoprim  800-160mg (BACTRIM DS) 800-160 mg Tab Take 1 tablet by mouth 2 (two) times daily. for 5 days 10 tablet 4/30/2023 5/5/2023 Jean Ferrell MD          Follow-up Information       Follow up With Specialties Details Why Contact Info    [facility]  Schedule an appointment as soon as possible for a visit   [address] [address]  [phone]      [facility] - Emergency Dept Emergency Medicine  As needed, If symptoms worsen [address] [place], [place] [address] [id number] [phone]             Jean Ferrell MD  04/30/23 0080

## 2023-05-03 ENCOUNTER — PATIENT OUTREACH (OUTPATIENT)
Dept: EMERGENCY MEDICINE | Facility: HOSPITAL | Age: 53
End: 2023-05-03

## 2023-06-14 ENCOUNTER — TELEPHONE (OUTPATIENT)
Dept: EMERGENCY MEDICINE | Facility: HOSPITAL | Age: 53
End: 2023-06-14
Payer: MEDICAID

## 2023-06-14 ENCOUNTER — HOSPITAL ENCOUNTER (EMERGENCY)
Facility: HOSPITAL | Age: 53
Discharge: HOME | End: 2023-06-14
Attending: EMERGENCY MEDICINE
Payer: MEDICAID

## 2023-06-14 VITALS
WEIGHT: 127 LBS | BODY MASS INDEX: 24.94 KG/M2 | TEMPERATURE: 98 F | HEIGHT: 60 IN | RESPIRATION RATE: 18 BRPM | HEART RATE: 106 BPM | OXYGEN SATURATION: 97 % | DIASTOLIC BLOOD PRESSURE: 82 MMHG | SYSTOLIC BLOOD PRESSURE: 150 MMHG

## 2023-06-14 DIAGNOSIS — M94.8X9 PERICHONDRITIS: ICD-10-CM

## 2023-06-14 DIAGNOSIS — H60.00: Primary | ICD-10-CM

## 2023-06-14 PROCEDURE — 99284 EMERGENCY DEPT VISIT MOD MDM: CPT

## 2023-06-14 PROCEDURE — 69000 DRG XTRNL EAR ABSC/HEM SMPL: CPT | Mod: LT

## 2023-06-14 PROCEDURE — 25000003 PHARM REV CODE 250: Performed by: PHYSICIAN ASSISTANT

## 2023-06-14 PROCEDURE — 87077 CULTURE AEROBIC IDENTIFY: CPT | Performed by: PHYSICIAN ASSISTANT

## 2023-06-14 PROCEDURE — 87186 SC STD MICRODIL/AGAR DIL: CPT | Performed by: PHYSICIAN ASSISTANT

## 2023-06-14 PROCEDURE — 87070 CULTURE OTHR SPECIMN AEROBIC: CPT | Performed by: PHYSICIAN ASSISTANT

## 2023-06-14 RX ORDER — IBUPROFEN 600 MG/1
600 TABLET ORAL
Status: COMPLETED | OUTPATIENT
Start: 2023-06-14 | End: 2023-06-14

## 2023-06-14 RX ORDER — CIPROFLOXACIN 500 MG/1
500 TABLET ORAL
Status: DISCONTINUED | OUTPATIENT
Start: 2023-06-14 | End: 2023-06-14

## 2023-06-14 RX ORDER — IBUPROFEN 600 MG/1
600 TABLET ORAL EVERY 6 HOURS PRN
Qty: 20 TABLET | Refills: 0 | Status: SHIPPED | OUTPATIENT
Start: 2023-06-14 | End: 2023-06-19

## 2023-06-14 RX ORDER — ONDANSETRON 4 MG/1
4 TABLET, ORALLY DISINTEGRATING ORAL EVERY 6 HOURS PRN
Qty: 15 TABLET | Refills: 0 | Status: SHIPPED | OUTPATIENT
Start: 2023-06-14 | End: 2023-06-19

## 2023-06-14 RX ORDER — ACETAMINOPHEN 500 MG
500 TABLET ORAL EVERY 4 HOURS PRN
Qty: 20 TABLET | Refills: 0 | Status: SHIPPED | OUTPATIENT
Start: 2023-06-14 | End: 2023-06-19

## 2023-06-14 RX ORDER — ONDANSETRON 4 MG/1
4 TABLET, ORALLY DISINTEGRATING ORAL
Status: COMPLETED | OUTPATIENT
Start: 2023-06-14 | End: 2023-06-14

## 2023-06-14 RX ORDER — CIPROFLOXACIN 500 MG/1
500 TABLET ORAL 2 TIMES DAILY
Qty: 20 TABLET | Refills: 0 | Status: SHIPPED | OUTPATIENT
Start: 2023-06-14 | End: 2023-06-24

## 2023-06-14 RX ORDER — MECLIZINE HYDROCHLORIDE 25 MG/1
25 TABLET ORAL 3 TIMES DAILY PRN
Qty: 30 TABLET | Refills: 0 | Status: SHIPPED | OUTPATIENT
Start: 2023-06-14

## 2023-06-14 RX ADMIN — ONDANSETRON 4 MG: 4 TABLET, ORALLY DISINTEGRATING ORAL at 09:06

## 2023-06-14 RX ADMIN — IBUPROFEN 600 MG: 600 TABLET, FILM COATED ORAL at 09:06

## 2023-06-15 NOTE — ED TRIAGE NOTES
52 yo female presents to ED with c/o of dizziness which resulted in a fall today around 1500 associated with N/V. Pt reports abscess to left ear that she believes may be the cause of the dizziness. Pt denies any other symptoms; endorses PMH of bipolar disorder. Pt rates pain at a 10 on a PRS of 0-10.

## 2023-06-15 NOTE — DISCHARGE INSTRUCTIONS
Follow up with ENT in 1-2 days. Take full course of antibiotics as prescribed     Thank you for coming to our Emergency Department today. It is important to remember that some problems are difficult to diagnose and may not be found during your Emergency Department visit. Be sure to follow up with your primary care doctor and review all labs/imaging/tests that were performed during this visit with them. Some labs/tests may be outside of the normal range and require non-emergent follow-up and further investigation to help diagnose/exclude/prevent complications or other medical conditions.    If you do not have a primary care doctor, you may contact the one listed on your discharge paperwork or you may also call the [facility] Appointment Desk at [phone] to schedule an appointment and establish care with one. It is important to your health that you have a primary care doctor.    Please take all medications as directed. All medications may potentially have side-effects and it is impossible to predict which medications may give you side-effects or what side-effects (if any) they will give you.. If you feel that you are having a negative effect or side-effect of any medication you should immediately stop taking them and seek medical attention. If you feel that you are having a life-threatening reaction call 911.    Return to the ER with any questions/concerns, new/concerning symptoms, worsening or failure to improve.     Do not drive, swim, climb to height, take a bath or make any important decisions for 24 hours if you have received any pain medications, sedatives or mood altering drugs during your ER visit.

## 2023-06-15 NOTE — ED PROVIDER NOTES
Encounter Date: 6/14/2023    SCRIBE #1 NOTE: ISAHRA am scribing for, and in the presence of,  Mouna Ma PA-C. I have scribed the following portions of the note - Other sections scribed: HPI, ROS, PE.     History     Chief Complaint   Patient presents with    Fall    Dizziness    Abscess     Pt presents to ED c/o fall that occurred around 1500 today.  Denies loc, head injury.  Pt also c/o dizziness and abscess to left upper ear x 1 month.  Associated symptoms nausea and vomiting.  Tender to touch.  Denies drainage, fever, or any other symptoms.    Pt reports taking  BC powder around 1530 today with some relief.  Pain 10/10.      CC: Otalgia    HPI: Alina Menchaca is a 53 y.o. female, with no pertinent PMHx, who presents to the ED with left otalgia which started ~1 month ago. Patient reports she visited ED 1 month ago for poison ivy to her arm. She states poison ivy rash spread to her ear, and believes this could be the cause of otalgia. Patient has not been wearing glasses due to otalgia, which has led to nausea/emesis and dizziness. Other associated symptoms include chills and headaches. Pt went to Urgent Care for symptoms earlier today, but was referred here for antibiotics. She attempted treatment with Neosporin to no relief. No other exacerbating or alleviating factors. Denies fever, ear drainage, or other associated symptoms. Endorses alcohol and marijuana use. Last drink was last night.      The history is provided by the patient. No  was used.   Review of patient's allergies indicates:   Allergen Reactions    Grass pollen-june grass standard Itching     Past Medical History:   Diagnosis Date    Asthma     Bipolar 1 disorder     Manic depression     Seizures      Past Surgical History:   Procedure Laterality Date    APPENDECTOMY      CARPAL TUNNEL RELEASE      COSMETIC SURGERY      breast implants     HERNIA REPAIR      HYSTERECTOMY      INSERTION OF BREAST IMPLANT       KNEE ARTHROPLASTY Right 6/1/2020    Procedure: ARTHROPLASTY, KNEE;  Surgeon: Vasiliy Ivory MD;  Location: [facility] OR;  Service: Orthopedics;  Laterality: Right;     Family History   Problem Relation Age of Onset    Schizophrenia Mother     Diabetes Father     Heart disease Father      Social History     Tobacco Use    Smoking status: Every Day     Packs/day: 0.50     Years: 30.00     Pack years: 15.00     Types: Cigarettes    Smokeless tobacco: Never   Substance Use Topics    Alcohol use: No    Drug use: Yes     Types: Marijuana     Comment: last smoked day before yesterday     Review of Systems   Constitutional:  Positive for chills. Negative for fever.   HENT:  Positive for ear pain. Negative for congestion, ear discharge, nosebleeds, rhinorrhea, sore throat and trouble swallowing.    Eyes:  Negative for redness.   Respiratory:  Negative for cough, shortness of breath and stridor.    Cardiovascular:  Negative for chest pain.   Gastrointestinal:  Positive for vomiting. Negative for abdominal pain, constipation, diarrhea and nausea.   Genitourinary:  Negative for decreased urine volume, dysuria, frequency, hematuria and urgency.   Musculoskeletal:  Negative for back pain and neck pain.   Skin:  Negative for rash and wound.   Neurological:  Positive for dizziness and headaches. Negative for speech difficulty, weakness, light-headedness and numbness.   Psychiatric/Behavioral:  Negative for confusion.      Physical Exam     Initial Vitals [06/14/23 1949]   BP Pulse Resp Temp SpO2   (!) 160/79 (!) 119 18 98.6 °F (37 °C) 97 %      MAP       --         Physical Exam    Nursing note and vitals reviewed.  Constitutional: She appears well-developed and well-nourished.   HENT:   Head: Normocephalic.   Right Ear: External ear normal.   Left Ear: External ear normal. There is tenderness.   Nose: Nose normal.   Mouth/Throat: Oropharynx is clear and moist.   Abscess to antihelix adjacent to the triangular fossa with  surrounding erythema. Significant ttp      Eyes: Conjunctivae and EOM are normal. Pupils are equal, round, and reactive to light.   Neck:   Normal range of motion.  Cardiovascular:  Regular rhythm.   Tachycardia present.   Exam reveals no gallop and no friction rub.       No murmur heard.  Pulmonary/Chest: Breath sounds normal. She has no wheezes. She has no rhonchi. She has no rales.   Abdominal: Abdomen is soft. Bowel sounds are normal. She exhibits no distension. There is no abdominal tenderness. There is no rebound, no guarding, no tenderness at McBurney's point and negative Domínguez's sign.   Musculoskeletal:         General: Normal range of motion.      Cervical back: Normal range of motion.     Lymphadenopathy:     She has no cervical adenopathy.   Neurological: She is alert. She has normal strength. No cranial nerve deficit or sensory deficit.   Skin: Skin is warm and dry.   Psychiatric: Her mood appears anxious.       ED Course   I & D - Incision and Drainage    Date/Time: 6/14/2023 10:41 PM  Location procedure was performed: [facility] EMERGENCY DEPARTMENT  Performed by: Maranda Corcoran MD  Authorized by: Maranda Corcoran MD   Type: abscess  Body area: head/neck  Location details: left external ear  Comments: Qtip used to apply pressure to the abscess and white drainage expressed from the area         Labs Reviewed   CULTURE, AEROBIC  (SPECIFY SOURCE)          Imaging Results    None          Medications   ibuprofen tablet 600 mg (600 mg Oral Given 6/14/23 2120)   ondansetron disintegrating tablet 4 mg (4 mg Oral Given 6/14/23 2120)     Medical Decision Making:   History:   Old Medical Records: I decided to obtain old medical records.  Clinical Tests:   Lab Tests: Ordered  ED Management:  53-year-old female presenting for evaluation of left ear pain redness and swelling.  Exam consistent with abscess with cellulitis/perichondritis.  Urine is expressed by Dr. Corcoran with application of qtip and pressure to the  area.  Wound culture collected.  Will cover with Cipro for Pseudomonas coverage.  She is not septic.  Initially tachycardic at 119  No fever.  Was tearful and anxious uncomfortable due to pain.  Ibuprofen Zofran in the ED.  She is tolerating p.o..  Will discharge patient medications for symptomatic treatment.  Will have her follow up with primary care and ENT.  Will have her return ER for worsening or as needed. Discussed with Dr. Corcoran who also evaluated pt face to face and she agreesw ith assessment and plan.         Scribe Attestation:   Scribe #1: I performed the above scribed service and the documentation accurately describes the services I performed. I attest to the accuracy of the note.                 I, Mouna aM PA-C , personally performed the services described in this documentation.  All medical record entries made by the scribe were at my direction and in my presence.  I have reviewed the chart and agree that the record reflects my personal performance and is accurate and complete.  Clinical Impression:   Final diagnoses:  [H60.00] Abscess of external ear, unspecified laterality (Primary)  [M94.8X9] Perichondritis        ED Disposition Condition    Discharge Stable          ED Prescriptions       Medication Sig Dispense Start Date End Date Auth. Provider    ciprofloxacin HCl (CIPRO) 500 MG tablet Take 1 tablet (500 mg total) by mouth 2 (two) times daily. for 10 days 20 tablet 6/14/2023 6/24/2023 Mouna Ma PA-C    ibuprofen (ADVIL,MOTRIN) 600 MG tablet Take 1 tablet (600 mg total) by mouth every 6 (six) hours as needed for Pain. 20 tablet 6/14/2023 6/19/2023 Mouna Ma PA-C    acetaminophen (TYLENOL) 500 MG tablet Take 1 tablet (500 mg total) by mouth every 4 (four) hours as needed for Pain. 20 tablet 6/14/2023 6/19/2023 Mouna Ma PA-C    ondansetron (ZOFRAN-ODT) 4 MG TbDL Take 1 tablet (4 mg total) by mouth every 6 (six) hours as needed (for nausea). 15 tablet  6/14/2023 6/19/2023 Mouna Ma PA-C    meclizine (ANTIVERT) 25 mg tablet Take 1 tablet (25 mg total) by mouth 3 (three) times daily as needed for Dizziness or Nausea. 30 tablet 6/14/2023 -- Mouna Ma PA-C          Follow-up Information       Follow up With Specialties Details Why Contact Info    [facility] [place] [place]  Schedule an appointment as soon as possible for a visit in 2 days for follow up [address]  [facility] [address]  [phone]      [facility] [place] - Emergency Dept Emergency Medicine Go to  As needed, If symptoms worsen [address] [facility] [address] [phone]             Mouna Ma PA-C  06/14/23 [id number]

## 2023-06-16 ENCOUNTER — PATIENT MESSAGE (OUTPATIENT)
Dept: PODIATRY | Facility: CLINIC | Age: 53
End: 2023-06-16
Payer: MEDICAID

## 2023-06-17 ENCOUNTER — TELEPHONE (OUTPATIENT)
Dept: EMERGENCY MEDICINE | Facility: HOSPITAL | Age: 53
End: 2023-06-17
Payer: MEDICAID

## 2023-06-17 LAB — BACTERIA SPEC AEROBE CULT: ABNORMAL

## 2023-06-17 RX ORDER — DOXYCYCLINE 100 MG/1
100 CAPSULE ORAL EVERY 12 HOURS
Qty: 14 CAPSULE | Refills: 0 | Status: SHIPPED | OUTPATIENT
Start: 2023-06-17 | End: 2023-06-24

## 2023-06-17 RX ORDER — MUPIROCIN 20 MG/G
OINTMENT TOPICAL 3 TIMES DAILY
Qty: 15 G | Refills: 0 | Status: SHIPPED | OUTPATIENT
Start: 2023-06-17 | End: 2023-06-24

## 2023-06-17 NOTE — TELEPHONE ENCOUNTER
On cipro. Will change to doxy.   Requesting bactroban. Will send   States she is still having pain despite taking ibuprofen and tylenol. Informed I cannot prescribe narcotics for thisbut she is welcome to return to ER for persisting or worsening. She states it has been draining somewhat.   Urged to follow up with ENT

## 2023-06-26 ENCOUNTER — PATIENT MESSAGE (OUTPATIENT)
Dept: OTOLARYNGOLOGY | Facility: CLINIC | Age: 53
End: 2023-06-26
Payer: MEDICAID

## 2023-09-14 ENCOUNTER — HOSPITAL ENCOUNTER (EMERGENCY)
Facility: HOSPITAL | Age: 53
Discharge: HOME | End: 2023-09-15
Attending: EMERGENCY MEDICINE
Payer: MEDICAID

## 2023-09-14 DIAGNOSIS — S61.452A DOG BITE OF LEFT HAND, INITIAL ENCOUNTER: Primary | ICD-10-CM
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
## 2023-09-14 PROCEDURE — 99284 EMERGENCY DEPT VISIT MOD MDM: CPT | Mod: 25

## 2023-09-14 PROCEDURE — 12042 INTMD RPR N-HF/GENIT2.6-7.5: CPT

## 2023-09-14 PROCEDURE — 12002 RPR S/N/AX/GEN/TRNK2.6-7.5CM: CPT | Mod: LT

## 2023-09-14 PROCEDURE — 25000003 PHARM REV CODE 250: Performed by: EMERGENCY MEDICINE

## 2023-09-14 RX ORDER — HYDROCODONE BITARTRATE AND ACETAMINOPHEN 5; 325 MG/1; MG/1
1 TABLET ORAL
Status: COMPLETED | OUTPATIENT
Start: 2023-09-14 | End: 2023-09-14

## 2023-09-14 RX ORDER — LIDOCAINE HYDROCHLORIDE 10 MG/ML
5 INJECTION INFILTRATION; PERINEURAL
Status: COMPLETED | OUTPATIENT
Start: 2023-09-14 | End: 2023-09-14

## 2023-09-14 RX ORDER — ONDANSETRON 4 MG/1
4 TABLET, ORALLY DISINTEGRATING ORAL
Status: COMPLETED | OUTPATIENT
Start: 2023-09-14 | End: 2023-09-14

## 2023-09-14 RX ORDER — AMOXICILLIN AND CLAVULANATE POTASSIUM 875; 125 MG/1; MG/1
1 TABLET, FILM COATED ORAL
Status: COMPLETED | OUTPATIENT
Start: 2023-09-14 | End: 2023-09-14

## 2023-09-14 RX ADMIN — AMOXICILLIN AND CLAVULANATE POTASSIUM 1 TABLET: 875; 125 TABLET, FILM COATED ORAL at 11:09

## 2023-09-14 RX ADMIN — ONDANSETRON 4 MG: 4 TABLET, ORALLY DISINTEGRATING ORAL at 10:09

## 2023-09-14 RX ADMIN — LIDOCAINE HYDROCHLORIDE 5 ML: 10 INJECTION, SOLUTION INFILTRATION; PERINEURAL at 10:09

## 2023-09-14 RX ADMIN — HYDROCODONE BITARTRATE AND ACETAMINOPHEN 1 TABLET: 5; 325 TABLET ORAL at 11:09

## 2023-09-15 VITALS
SYSTOLIC BLOOD PRESSURE: 152 MMHG | OXYGEN SATURATION: 98 % | HEART RATE: 85 BPM | HEIGHT: 60 IN | TEMPERATURE: 98 F | BODY MASS INDEX: 25.52 KG/M2 | RESPIRATION RATE: 21 BRPM | WEIGHT: 130 LBS | DIASTOLIC BLOOD PRESSURE: 83 MMHG

## 2023-09-15 PROCEDURE — 12042 INTMD RPR N-HF/GENIT2.6-7.5: CPT

## 2023-09-15 PROCEDURE — 25000003 PHARM REV CODE 250: Performed by: EMERGENCY MEDICINE

## 2023-09-15 RX ORDER — HYDROCODONE BITARTRATE AND ACETAMINOPHEN 5; 325 MG/1; MG/1
1 TABLET ORAL EVERY 6 HOURS PRN
Qty: 10 TABLET | Refills: 0 | Status: SHIPPED | OUTPATIENT
Start: 2023-09-15

## 2023-09-15 RX ORDER — IBUPROFEN 600 MG/1
600 TABLET ORAL EVERY 6 HOURS PRN
Qty: 20 TABLET | Refills: 0 | Status: SHIPPED | OUTPATIENT
Start: 2023-09-15

## 2023-09-15 RX ORDER — AMOXICILLIN AND CLAVULANATE POTASSIUM 875; 125 MG/1; MG/1
1 TABLET, FILM COATED ORAL 2 TIMES DAILY
Qty: 20 TABLET | Refills: 0 | Status: SHIPPED | OUTPATIENT
Start: 2023-09-15 | End: 2023-09-25

## 2023-09-15 RX ORDER — BACITRACIN 500 [USP'U]/G
OINTMENT TOPICAL
Status: COMPLETED | OUTPATIENT
Start: 2023-09-15 | End: 2023-09-15

## 2023-09-15 RX ADMIN — BACITRACIN: 500 OINTMENT TOPICAL at 12:09

## 2023-09-15 NOTE — ED TRIAGE NOTES
Pt presents to ED d/t dog bites.Pt reports she was trying to stop her 9yo Turkish bulldog from escaping the yard when he turned around and bit her L hand (wound to anterior and posterior hand) and to L shin.Pt reports dogs vaccines are all up to date. Bleeding controlled at this time, bandages in place. Pt is AAOx4, family at bedside.

## 2023-09-15 NOTE — DISCHARGE INSTRUCTIONS
Thank you for coming to our Emergency Department today. It is important to remember that some problems are difficult to diagnose and may not be found during your Emergency Department visit. Be sure to follow up with your primary care doctor and review all labs/imaging/tests that were performed during this visit with them. Some labs/tests may be outside of the normal range and require non-emergent follow-up and further investigation to help diagnose/exclude/prevent complications or other medical conditions.    If you do not have a primary care doctor, you may contact the one listed on your discharge paperwork or you may also call the [facility] Appointment Desk at [phone] to schedule an appointment and establish care with one. It is important to your health that you have a primary care doctor.    Medicaid Escalation Line:   [phone] - Please contact this number if you are having difficulty getting follow up with a Primary Care Provider or Speciality Provider.     Please take all medications as directed. All medications may potentially have side-effects and it is impossible to predict which medications may give you side-effects or what side-effects (if any) they will give you.. If you feel that you are having a negative effect or side-effect of any medication you should immediately stop taking them and seek medical attention. If you feel that you are having a life-threatening reaction call [phone].    Return to the ER with any questions/concerns, new/concerning symptoms, worsening or failure to improve.     Do not drive, swim, climb to height, take a bath or make any important decisions for 24 hours if you have received any pain medications, sedatives or mood altering drugs during your ER visit.        BELOW THIS LINE ONLY APPLIES IF YOU HAVE A COVID TEST PENDING OR IF YOU HAVE BEEN DIAGNOSED WITH COVID:  Please access [facility] to review the results of your test. Until the results of your COVID test  return, you should isolate yourself so as not to potentially spread illness to others.   If your COVID test returns positive, you should isolate yourself so as not to spread illness to others. After five full days, if you are feeling better and you have not had fever for 24 hours, you can return to your typical daily activities, but you must wear a mask around others for an additional 5 days.   If your COVID test returns negative and you are either unvaccinated or more than six months out from your two-dose vaccine and are not yet boosted, you should still quarantine for 5 full days followed by strict mask use for an additional 5 full days.   If your COVID test returns negative and you have received your 2-dose initial vaccine as well as a booster, you should continue strict mask use for 10 full days after the exposure.  For all those exposed, best practice includes a test at day 5 after the exposure. This can be a home test or a test through one of the many testing centers throughout our community.   Masking is always advised to limit the spread of COVID. Cdc.gov is an excellent site to obtain the latest up to date recommendations regarding COVID and COVID testing.     CDC Testing and Quarantine Guidelines for patients with exposure to a known-positive COVID-19 person:  A close exposure is defined as anyone who has had an exposure (masked or unmasked) to a known COVID -19 positive person within 6 feet of someone for a cumulative total of 15 minutes or more over a 24-hour period.   Vaccinated and/or if you recently had a positive covid test within 90 days do NOT need to quarantine after contact with someone who had COVID-19 unless you develop symptoms.   Fully vaccinated people who have not had a positive test within 90 days, should get tested 3-5 days after their exposure, even if they don't have symptoms and wear a mask indoors in public for 14 days following exposure or until their test result is  negative.      Unvaccinated and/or NOT had a positive test within 90 days and meet close exposure  You are required by CDC guidelines to quarantine for at least 5 days from time of exposure followed by 5 days of strict masking. It is recommended, but not required to test after 5 days, unless you develop symptoms, in which case you should test at that time.  If you get tested after 5 days and your test is positive, your 5 day period of isolation starts the day of the positive test.    If your exposure does not meet the above definition, you can return to your normal daily activities to include social distancing, wearing a mask and frequent handwashing.      Here is a link to guidance from the CDC:  https://www.cdc.gov/media/releases/2021/s1227-isolation-quarantine-guidance.html      [facility]  Health Testing Sites:  https://ldh.la.gov/page/3934      [facility] website with testing locations and guidance:  [email or web address]

## 2023-09-15 NOTE — FIRST PROVIDER EVALUATION
Medical screening examination initiated.  I have conducted a focused provider triage encounter, findings are as follows:    Brief history of present illness:  53 year old female with PMHx of BP1, asthma, and seizures presents to ED for dog bite. Patient states her own dog bite her. Dog is UTD on shots. Patient UTD. Bit her in left hand and left lower leg.    There were no vitals filed for this visit.    Pertinent physical exam:  Aox4. No acute distress. 2 dog bites to left palmar and dorsal hand. 1 dog bite to left lower leg. Bleeding controlled.     Brief workup plan:  wound care and pain control    Preliminary workup initiated; this workup will be continued and followed by the physician or advanced practice provider that is assigned to the patient when roomed.

## 2023-10-17 NOTE — ED NOTES
Bed: 07main  Expected date:   Expected time:   Means of arrival:   Comments:  EMS   [facility] called. They stated that patient is still in [place] until December and  Mom picked up only 30 day supply of Vyvanse locally due to the 90 day supply being lost in mail per below messages.     [facility] cannot do a replacement shipment due to the fact that it was lost in mail after dad shipped it himself to patient in [place] instead of shipment directly from [facility] to patient.     However, they can do an early refill for balance of the 90 day supply, qty 60 (60 day supply) since mom picked up 30 capsules locally and ship directly to patient.     Then when she is back in December, the next 90 day refill should be able to be sent to [facility] again.    Prepped new prescription for 60 capsules (60 day supply) and sent to Dr. Chung for approval and sending to pharmacy.

## 2023-11-18 ENCOUNTER — HOSPITAL ENCOUNTER (EMERGENCY)
Facility: HOSPITAL | Age: 53
Discharge: HOME | End: 2023-11-19
Attending: EMERGENCY MEDICINE
Payer: MEDICAID

## 2023-11-18 DIAGNOSIS — H61.002 PERICHONDRITIS OF AURICLE, LEFT: Primary | ICD-10-CM

## 2023-11-18 LAB
ALBUMIN SERPL BCP-MCNC: 3.6 G/DL (ref 3.5–5.2)
ALLENS TEST: NORMAL
ALP SERPL-CCNC: 93 U/L (ref 55–135)
ALT SERPL W/O P-5'-P-CCNC: 23 U/L (ref 10–44)
ANION GAP SERPL CALC-SCNC: 12 MMOL/L (ref 8–16)
AST SERPL-CCNC: 29 U/L (ref 10–40)
BASOPHILS # BLD AUTO: 0.06 K/UL (ref 0–0.2)
BASOPHILS NFR BLD: 0.9 % (ref 0–1.9)
BILIRUB SERPL-MCNC: 0.2 MG/DL (ref 0.1–1)
BUN SERPL-MCNC: 16 MG/DL (ref 6–20)
CALCIUM SERPL-MCNC: 8.8 MG/DL (ref 8.7–10.5)
CHLORIDE SERPL-SCNC: 104 MMOL/L (ref 95–110)
CO2 SERPL-SCNC: 24 MMOL/L (ref 23–29)
CREAT SERPL-MCNC: 0.8 MG/DL (ref 0.5–1.4)
DIFFERENTIAL METHOD: ABNORMAL
EOSINOPHIL # BLD AUTO: 0.2 K/UL (ref 0–0.5)
EOSINOPHIL NFR BLD: 3.3 % (ref 0–8)
ERYTHROCYTE [DISTWIDTH] IN BLOOD BY AUTOMATED COUNT: 12.5 % (ref 11.5–14.5)
EST. GFR  (NO RACE VARIABLE): >60 ML/MIN/1.73 M^2
GLUCOSE SERPL-MCNC: 98 MG/DL (ref 70–110)
HCT VFR BLD AUTO: 37.4 % (ref 37–48.5)
HGB BLD-MCNC: 12.8 G/DL (ref 12–16)
IMM GRANULOCYTES # BLD AUTO: 0.05 K/UL (ref 0–0.04)
IMM GRANULOCYTES NFR BLD AUTO: 0.7 % (ref 0–0.5)
LDH SERPL L TO P-CCNC: 1.25 MMOL/L (ref 0.5–2.2)
LYMPHOCYTES # BLD AUTO: 2.8 K/UL (ref 1–4.8)
LYMPHOCYTES NFR BLD: 41.2 % (ref 18–48)
MCH RBC QN AUTO: 31.5 PG (ref 27–31)
MCHC RBC AUTO-ENTMCNC: 34.2 G/DL (ref 32–36)
MCV RBC AUTO: 92 FL (ref 82–98)
MONOCYTES # BLD AUTO: 0.4 K/UL (ref 0.3–1)
MONOCYTES NFR BLD: 5.8 % (ref 4–15)
NEUTROPHILS # BLD AUTO: 3.3 K/UL (ref 1.8–7.7)
NEUTROPHILS NFR BLD: 48.1 % (ref 38–73)
NRBC BLD-RTO: 0 /100 WBC
PLATELET # BLD AUTO: 230 K/UL (ref 150–450)
PMV BLD AUTO: 9 FL (ref 9.2–12.9)
POTASSIUM SERPL-SCNC: 3.8 MMOL/L (ref 3.5–5.1)
PROT SERPL-MCNC: 6.5 G/DL (ref 6–8.4)
RBC # BLD AUTO: 4.06 M/UL (ref 4–5.4)
SAMPLE: NORMAL
SITE: NORMAL
SODIUM SERPL-SCNC: 140 MMOL/L (ref 136–145)
WBC # BLD AUTO: 6.89 K/UL (ref 3.9–12.7)

## 2023-11-18 PROCEDURE — 83605 ASSAY OF LACTIC ACID: CPT

## 2023-11-18 PROCEDURE — 63600175 PHARM REV CODE 636 W HCPCS: Performed by: EMERGENCY MEDICINE

## 2023-11-18 PROCEDURE — 96375 TX/PRO/DX INJ NEW DRUG ADDON: CPT | Mod: 59

## 2023-11-18 PROCEDURE — 99900035 HC TECH TIME PER 15 MIN (STAT)

## 2023-11-18 PROCEDURE — 85025 COMPLETE CBC W/AUTO DIFF WBC: CPT | Performed by: EMERGENCY MEDICINE

## 2023-11-18 PROCEDURE — 96365 THER/PROPH/DIAG IV INF INIT: CPT | Mod: 59

## 2023-11-18 PROCEDURE — 87040 BLOOD CULTURE FOR BACTERIA: CPT | Performed by: EMERGENCY MEDICINE

## 2023-11-18 PROCEDURE — 80053 COMPREHEN METABOLIC PANEL: CPT | Performed by: EMERGENCY MEDICINE

## 2023-11-18 PROCEDURE — 99285 EMERGENCY DEPT VISIT HI MDM: CPT | Mod: 25

## 2023-11-18 PROCEDURE — 25000003 PHARM REV CODE 250: Performed by: EMERGENCY MEDICINE

## 2023-11-18 RX ORDER — CIPROFLOXACIN 2 MG/ML
400 INJECTION, SOLUTION INTRAVENOUS
Status: COMPLETED | OUTPATIENT
Start: 2023-11-18 | End: 2023-11-19

## 2023-11-18 RX ORDER — KETOROLAC TROMETHAMINE 30 MG/ML
15 INJECTION, SOLUTION INTRAMUSCULAR; INTRAVENOUS
Status: COMPLETED | OUTPATIENT
Start: 2023-11-18 | End: 2023-11-18

## 2023-11-18 RX ADMIN — SODIUM CHLORIDE 1365 ML: 9 INJECTION, SOLUTION INTRAVENOUS at 11:11

## 2023-11-18 RX ADMIN — CIPROFLOXACIN 400 MG: 2 INJECTION, SOLUTION INTRAVENOUS at 11:11

## 2023-11-18 RX ADMIN — KETOROLAC TROMETHAMINE 15 MG: 30 INJECTION, SOLUTION INTRAMUSCULAR; INTRAVENOUS at 11:11

## 2023-11-19 VITALS
TEMPERATURE: 97 F | HEIGHT: 60 IN | BODY MASS INDEX: 25.13 KG/M2 | HEART RATE: 103 BPM | WEIGHT: 128 LBS | RESPIRATION RATE: 16 BRPM | DIASTOLIC BLOOD PRESSURE: 68 MMHG | SYSTOLIC BLOOD PRESSURE: 148 MMHG | OXYGEN SATURATION: 99 %

## 2023-11-19 PROCEDURE — 63600175 PHARM REV CODE 636 W HCPCS: Performed by: EMERGENCY MEDICINE

## 2023-11-19 PROCEDURE — 25500020 PHARM REV CODE 255: Performed by: EMERGENCY MEDICINE

## 2023-11-19 RX ORDER — ONDANSETRON 2 MG/ML
4 INJECTION INTRAMUSCULAR; INTRAVENOUS
Status: COMPLETED | OUTPATIENT
Start: 2023-11-19 | End: 2023-11-19

## 2023-11-19 RX ORDER — CIPROFLOXACIN 500 MG/1
750 TABLET ORAL EVERY 12 HOURS
Qty: 21 TABLET | Refills: 0 | Status: SHIPPED | OUTPATIENT
Start: 2023-11-19 | End: 2023-11-26

## 2023-11-19 RX ADMIN — ONDANSETRON 4 MG: 2 INJECTION INTRAMUSCULAR; INTRAVENOUS at 12:11

## 2023-11-19 RX ADMIN — IOHEXOL 75 ML: 350 INJECTION, SOLUTION INTRAVENOUS at 12:11

## 2023-11-19 NOTE — DISCHARGE INSTRUCTIONS
Recommend finishing for course of antibiotics even if symptoms are improving.    Recommend ibuprofen or Tylenol as needed for pain control.    Keep manipulation of the ear to a minimum to prevent pain and redness.  If you know worsening swelling, worsening pain, fevers, drainage or other concerns return to the emergency room or seek medical care immediately.

## 2023-11-19 NOTE — ED PROVIDER NOTES
"Encounter Date: 11/18/2023    SCRIBE #1 NOTE: I, Yayo Garcia, am scribing for, and in the presence of,  Max Ma MD.       History     Chief Complaint   Patient presents with    Abscess     Arrives to ED with complaints of cysts in left ear, reports pt. Has procedure scheduled to have it drained in December. Reports cyst has grown in size, complaining of headache and pressure. Reports normally taking abx but states "this is the first time I've gone without abx in awhile, they told me it came back positive for staph."     Alina Menchaca is a 53 y.o. female with a PMHx of asthma, COPD, bipolar 1 disorder, manic depression, seizures, who presents to the ED for left ear pain and swelling for 4 days. Patient reports complaints of left ear swelling and pain, ear ringing, blurred vision, dizziness, and drainage. She also notes a fever she treated with tylenol. She was initially seen on 06/14 for similar complaints, and was discharged with a course of cipro, which she endorses she finished. She further notes following with ENT, noting she was last seen on October 17 and is currently scheduled for a procedure on December 12th. She notes most recent antibiotic compliance was 3 weeks ago. Denies Q-tip usage. No other medications taken PTA. No alleviating or exacerbating factors noted. Denies nausea, vomiting, CP, SOB, or other associated symptoms. Endorses tobacco and marijuana usage.     The history is provided by the patient. No  was used.     Review of patient's allergies indicates:   Allergen Reactions    Grass pollen-june grass standard Itching     Past Medical History:   Diagnosis Date    Asthma     Bipolar 1 disorder     Manic depression     Seizures      Past Surgical History:   Procedure Laterality Date    APPENDECTOMY      CARPAL TUNNEL RELEASE      COSMETIC SURGERY      breast implants     HERNIA REPAIR      HYSTERECTOMY      INSERTION OF BREAST IMPLANT      KNEE ARTHROPLASTY Right " 6/1/2020    Procedure: ARTHROPLASTY, KNEE;  Surgeon: Vasiliy Ivory MD;  Location: [facility];  Service: Orthopedics;  Laterality: Right;     Family History   Problem Relation Age of Onset    Schizophrenia Mother     Diabetes Father     Heart disease Father      Social History     Tobacco Use    Smoking status: Every Day     Current packs/day: 0.50     Average packs/day: 0.5 packs/day for 30.0 years (15.0 ttl pk-yrs)     Types: Cigarettes    Smokeless tobacco: Never   Substance Use Topics    Alcohol use: No    Drug use: Yes     Types: Marijuana     Comment: last smoked today, states this is prescribed to me     Review of Systems   Constitutional:  Positive for fever. Negative for chills.   HENT:  Positive for ear discharge and ear pain. Negative for sore throat.    Respiratory:  Negative for cough and shortness of breath.    Cardiovascular:  Negative for chest pain.   Gastrointestinal:  Negative for nausea.   Genitourinary:  Negative for dysuria.   Musculoskeletal:  Negative for back pain.   Skin:  Negative for rash.   Neurological:  Positive for headaches. Negative for weakness.   Psychiatric/Behavioral:  Negative for confusion.        Physical Exam     Initial Vitals [11/18/23 2124]   BP Pulse Resp Temp SpO2   (!) 150/80 (!) 111 16 97.9 °F (36.6 °C) 97 %      MAP       --         Physical Exam    Nursing note and vitals reviewed.  Constitutional: She appears well-developed and well-nourished.   HENT:   Head: Normocephalic and atraumatic.   Left mastoid tenderness to palpation. No mastoid erythema.   Left auricle erythema and edema noted. No lesions noted.  Earrings in place.   Eyes: Conjunctivae are normal. Pupils are equal, round, and reactive to light.   Neck: Neck supple.   Normal range of motion.  Cardiovascular:  Normal rate, regular rhythm and normal heart sounds.           Pulmonary/Chest: Breath sounds normal. No respiratory distress.   Abdominal: Abdomen is soft. She exhibits no distension. There is no  abdominal tenderness.   Musculoskeletal:         General: Normal range of motion.      Cervical back: Normal range of motion and neck supple.     Neurological: She is alert and oriented to person, place, and time. GCS score is 15. GCS eye subscore is 4. GCS verbal subscore is 5. GCS motor subscore is 6.   Skin: Skin is warm and dry. Capillary refill takes less than 2 seconds.   Psychiatric: She has a normal mood and affect. Her behavior is normal. Judgment and thought content normal.         ED Course   Procedures  Labs Reviewed   CBC W/ AUTO DIFFERENTIAL - Abnormal; Notable for the following components:       Result Value    MCH 31.5 (*)     MPV 9.0 (*)     Immature Granulocytes 0.7 (*)     Immature Grans (Abs) 0.05 (*)     All other components within normal limits   CULTURE, BLOOD   CULTURE, BLOOD   COMPREHENSIVE METABOLIC PANEL   LACTIC ACID, PLASMA   ISTAT LACTATE          Imaging Results              CT Temporal Bone with Contrast (Final result)  Result time 11/19/23 01:06:01      Final result by Jennifer Damian MD (11/19/23 01:06:01)                   Impression:      1. Asymmetric soft tissue thickening and mild edema involving the left external ear.  Possible cellulitis with no abscess seen.  2. No evidence of mastoiditis.      Electronically signed by: Jennifer Damian MD  Date:    11/19/2023  Time:    01:06               Narrative:    EXAMINATION:  CT TEMPORAL BONE WITH CONTRAST    CLINICAL HISTORY:  Mastoiditis;left;    TECHNIQUE:  Axial images were acquired through the temporal bones and skull base following administration of 75 cc Omnipaque contrast administration.  Coronal and sagittal reconstructions were performed.    COMPARISON:  None    FINDINGS:  Right Temporal Bone:    *External ear: Normal.  *Middle ear: Normal.  *Petrous temporal bone/mastoid air cells: Clear. No fracture.  *Inner ear: Normal.  *IAC/CPA: Normal.  *Other: N/A.  .    Left Temporal Bone:    *External ear: Asymmetric soft tissue  thickening and mild edema.  *Middle ear: Normal.  *Petrous temporal bone/mastoid air cells: Clear. No fracture.  *Inner ear: Normal.  *IAC/CPA: Normal.  *Other: N/A.  .    Intracranial Compartment (limited evaluation): Normal.    Skull/Extracranial Contents (limited evaluation): Normal.                                       Medications   sodium chloride 0.9% bolus 1,365 mL 1,365 mL (0 mLs Intravenous Stopped 11/19/23 0012)   ciprofloxacin (CIPRO)400mg/200ml D5W IVPB 400 mg (0 mg Intravenous Stopped 11/19/23 0012)   ketorolac injection 15 mg (15 mg Intravenous Given 11/18/23 2308)   ondansetron injection 4 mg (4 mg Intravenous Given 11/19/23 0045)   iohexoL (OMNIPAQUE 350) injection 75 mL (75 mLs Intravenous Given 11/19/23 0041)     Medical Decision Making    53-year-old female presenting with left ear pain.  Patient has intermittently been dealing with infection of the left auricle.  On visual inspection appears to be perichondritis.  Appears to be minimal swelling to the ear.  No palpable area of drainage to require I and D at this time.  No TM effusion.  Patient was previously treated for staph infection.  Patient was given ciprofloxacin to cover for Pseudomonas.  Patient has follow up with ENT.  Discussed returning to the emergency room if swelling worsens, pain worsens or other concerns.  No sign of mastoiditis on CT imaging.      Differential includes perichondritis, mastoiditis, abscess.    Amount and/or Complexity of Data Reviewed  Labs: ordered.  Radiology: ordered.    Risk  Prescription drug management.            Scribe Attestation:   Scribe #1: I performed the above scribed service and the documentation accurately describes the services I performed. I attest to the accuracy of the note.        ED Course as of 11/19/23 0122   Sun Nov 19, 2023   0119 Patient has follow up with ENT in December.  Recommended keeping her current appointment.   [JM]      ED Course User Index  [JM] Max Ma MD                     I, Max Ma, personally performed the services described in this documentation. All medical record entries made by the scribe were at my direction and in my presence. I have reviewed the chart and agree that the record reflects my personal performance and is accurate and complete.      Clinical Impression:  Final diagnoses:  [H61.002] Perichondritis of auricle, left (Primary)          ED Disposition Condition    Discharge Stable          ED Prescriptions       Medication Sig Dispense Start Date End Date Auth. Provider    ciprofloxacin HCl (CIPRO) 500 MG tablet Take 1.5 tablets (750 mg total) by mouth every 12 (twelve) hours. for 7 days 21 tablet 11/19/2023 11/26/2023 Max Ma MD          Follow-up Information       Follow up With Specialties Details Why Contact Info [facility]  Schedule an appointment as soon as possible for a visit in 1 week [place] [address] [place] [address]    [facility] - Emergency Dept Emergency Medicine  If symptoms worsen [address] [facility] [place] [address]  [phone]             Max Ma MD  11/19/23 0122

## 2023-11-19 NOTE — ED TRIAGE NOTES
"Pt presented to ED with the complaints of cyst on her left ear which is growing in size. She was seen at an urgent care and they squeezed it assuming it to be a pimple, they later found out she was staph positive .  States she was on antibiotics for 5 months discontinued  for past 2 weeks states "this is the longest I have been off antibiotics "    Also reports small cyst in her left nostril , states that her doctor has suspected of skin cancer . Pt states she has an appointment on Dec 12, 2023 for removal of cyst but since her doctor is out of town she was referred to ED for further evaluation    Reports throbbing pain and light sensitivity  , states " I feel like someone's sitting on my ear , left side of my face feels like someone just slapped me , the pain feels that bad", no med's taken PTA. Has a PMHx of Bipolar disorder  "

## 2023-11-23 LAB
BACTERIA BLD CULT: NORMAL
BACTERIA BLD CULT: NORMAL

## 2024-04-06 ENCOUNTER — HOSPITAL ENCOUNTER (EMERGENCY)
Facility: HOSPITAL | Age: 54
Discharge: HOME | End: 2024-04-06
Attending: EMERGENCY MEDICINE
Payer: MEDICAID

## 2024-04-06 VITALS
OXYGEN SATURATION: 99 % | HEART RATE: 92 BPM | DIASTOLIC BLOOD PRESSURE: 83 MMHG | BODY MASS INDEX: 25.13 KG/M2 | HEIGHT: 60 IN | WEIGHT: 128 LBS | RESPIRATION RATE: 18 BRPM | TEMPERATURE: 98 F | SYSTOLIC BLOOD PRESSURE: 143 MMHG

## 2024-04-06 DIAGNOSIS — H61.002 PERICHONDRITIS OF AURICLE, LEFT: Primary | ICD-10-CM

## 2024-04-06 LAB
ALBUMIN SERPL BCP-MCNC: 3.6 G/DL (ref 3.5–5.2)
ALP SERPL-CCNC: 82 U/L (ref 55–135)
ALT SERPL W/O P-5'-P-CCNC: 15 U/L (ref 10–44)
ANION GAP SERPL CALC-SCNC: 7 MMOL/L (ref 8–16)
AST SERPL-CCNC: 24 U/L (ref 10–40)
BASOPHILS # BLD AUTO: 0.04 K/UL (ref 0–0.2)
BASOPHILS NFR BLD: 0.7 % (ref 0–1.9)
BILIRUB SERPL-MCNC: 0.3 MG/DL (ref 0.1–1)
BUN SERPL-MCNC: 11 MG/DL (ref 6–20)
CALCIUM SERPL-MCNC: 9 MG/DL (ref 8.7–10.5)
CHLORIDE SERPL-SCNC: 112 MMOL/L (ref 95–110)
CO2 SERPL-SCNC: 23 MMOL/L (ref 23–29)
CREAT SERPL-MCNC: 0.7 MG/DL (ref 0.5–1.4)
DIFFERENTIAL METHOD BLD: ABNORMAL
EOSINOPHIL # BLD AUTO: 0.1 K/UL (ref 0–0.5)
EOSINOPHIL NFR BLD: 2.4 % (ref 0–8)
ERYTHROCYTE [DISTWIDTH] IN BLOOD BY AUTOMATED COUNT: 12.7 % (ref 11.5–14.5)
EST. GFR  (NO RACE VARIABLE): >60 ML/MIN/1.73 M^2
GLUCOSE SERPL-MCNC: 81 MG/DL (ref 70–110)
HCT VFR BLD AUTO: 36.9 % (ref 37–48.5)
HGB BLD-MCNC: 12.5 G/DL (ref 12–16)
IMM GRANULOCYTES # BLD AUTO: 0.02 K/UL (ref 0–0.04)
IMM GRANULOCYTES NFR BLD AUTO: 0.3 % (ref 0–0.5)
LYMPHOCYTES # BLD AUTO: 2.1 K/UL (ref 1–4.8)
LYMPHOCYTES NFR BLD: 35.3 % (ref 18–48)
MCH RBC QN AUTO: 31.7 PG (ref 27–31)
MCHC RBC AUTO-ENTMCNC: 33.9 G/DL (ref 32–36)
MCV RBC AUTO: 94 FL (ref 82–98)
MONOCYTES # BLD AUTO: 0.5 K/UL (ref 0.3–1)
MONOCYTES NFR BLD: 9 % (ref 4–15)
NEUTROPHILS # BLD AUTO: 3.1 K/UL (ref 1.8–7.7)
NEUTROPHILS NFR BLD: 52.3 % (ref 38–73)
NRBC BLD-RTO: 0 /100 WBC
PLATELET # BLD AUTO: 210 K/UL (ref 150–450)
PMV BLD AUTO: 8.7 FL (ref 9.2–12.9)
POTASSIUM SERPL-SCNC: 3.6 MMOL/L (ref 3.5–5.1)
PROT SERPL-MCNC: 6.7 G/DL (ref 6–8.4)
RBC # BLD AUTO: 3.94 M/UL (ref 4–5.4)
SODIUM SERPL-SCNC: 142 MMOL/L (ref 136–145)
WBC # BLD AUTO: 5.87 K/UL (ref 3.9–12.7)

## 2024-04-06 PROCEDURE — 96375 TX/PRO/DX INJ NEW DRUG ADDON: CPT

## 2024-04-06 PROCEDURE — 96361 HYDRATE IV INFUSION ADD-ON: CPT

## 2024-04-06 PROCEDURE — 96365 THER/PROPH/DIAG IV INF INIT: CPT

## 2024-04-06 PROCEDURE — 63600175 PHARM REV CODE 636 W HCPCS

## 2024-04-06 PROCEDURE — 25500020 PHARM REV CODE 255

## 2024-04-06 PROCEDURE — 85025 COMPLETE CBC W/AUTO DIFF WBC: CPT

## 2024-04-06 PROCEDURE — 80053 COMPREHEN METABOLIC PANEL: CPT

## 2024-04-06 PROCEDURE — 99285 EMERGENCY DEPT VISIT HI MDM: CPT | Mod: 25

## 2024-04-06 PROCEDURE — 87070 CULTURE OTHR SPECIMN AEROBIC: CPT

## 2024-04-06 PROCEDURE — 25000003 PHARM REV CODE 250

## 2024-04-06 RX ORDER — CLINDAMYCIN HYDROCHLORIDE 150 MG/1
450 CAPSULE ORAL 3 TIMES DAILY
Qty: 63 CAPSULE | Refills: 0 | Status: SHIPPED | OUTPATIENT
Start: 2024-04-06 | End: 2024-04-13

## 2024-04-06 RX ORDER — FLUCONAZOLE 100 MG/1
200 TABLET ORAL
Status: DISCONTINUED | OUTPATIENT
Start: 2024-04-06 | End: 2024-04-06

## 2024-04-06 RX ORDER — ONDANSETRON 4 MG/1
4 TABLET, ORALLY DISINTEGRATING ORAL EVERY 8 HOURS PRN
Qty: 15 TABLET | Refills: 0 | Status: SHIPPED | OUTPATIENT
Start: 2024-04-06

## 2024-04-06 RX ORDER — KETOROLAC TROMETHAMINE 30 MG/ML
30 INJECTION, SOLUTION INTRAMUSCULAR; INTRAVENOUS
Status: COMPLETED | OUTPATIENT
Start: 2024-04-06 | End: 2024-04-06

## 2024-04-06 RX ORDER — ONDANSETRON HYDROCHLORIDE 2 MG/ML
4 INJECTION, SOLUTION INTRAVENOUS
Status: COMPLETED | OUTPATIENT
Start: 2024-04-06 | End: 2024-04-06

## 2024-04-06 RX ORDER — IBUPROFEN 800 MG/1
800 TABLET ORAL EVERY 6 HOURS PRN
Qty: 20 TABLET | Refills: 0 | Status: SHIPPED | OUTPATIENT
Start: 2024-04-06

## 2024-04-06 RX ORDER — MUPIROCIN 20 MG/G
OINTMENT TOPICAL 2 TIMES DAILY
Qty: 15 G | Refills: 0 | Status: SHIPPED | OUTPATIENT
Start: 2024-04-06

## 2024-04-06 RX ORDER — FLUCONAZOLE 200 MG/1
200 TABLET ORAL ONCE
Qty: 1 TABLET | Refills: 0 | Status: SHIPPED | OUTPATIENT
Start: 2024-04-06 | End: 2024-04-06

## 2024-04-06 RX ADMIN — IOHEXOL 75 ML: 350 INJECTION, SOLUTION INTRAVENOUS at 10:04

## 2024-04-06 RX ADMIN — KETOROLAC TROMETHAMINE 30 MG: 30 INJECTION, SOLUTION INTRAMUSCULAR at 08:04

## 2024-04-06 RX ADMIN — ONDANSETRON 4 MG: 2 INJECTION INTRAMUSCULAR; INTRAVENOUS at 08:04

## 2024-04-06 RX ADMIN — SODIUM CHLORIDE 1000 ML: 9 INJECTION, SOLUTION INTRAVENOUS at 08:04

## 2024-04-06 RX ADMIN — CEFTRIAXONE 2 G: 2 INJECTION, POWDER, FOR SOLUTION INTRAMUSCULAR; INTRAVENOUS at 08:04

## 2024-04-07 NOTE — ED TRIAGE NOTES
Pt reports pain to left ear and left side of face, describes as a pressure that she's had for years but pain as gotten worse x 4 days.  Hx of MRSA, bipolar.

## 2024-04-07 NOTE — DISCHARGE INSTRUCTIONS
Complete all antibiotics as prescribed.  Wound culture is pending.  If any secondary medication as needed, we will contact you and prescribed as necessary.  It is very important that you follow up with your ENT for further evaluation and management.  Drink lots of fluids, stay well hydrated. Alternate Tylenol/Ibuprofen as needed for pain/ fever; go back and forth between these two medications every 4 hrs as needed for temp greater than or equal to 100.4F.  You may take 800 ibuprofen and 500 to a 1000 mg of Tylenol at 1 time.  Maximum dose of Tylenol and 1 day is 3000 mg in the maximum dose of ibuprofen and 1 day is 1200mg.  You may also trial intermittent warm compresses to help drainage of the area.  Try not to squeeze, rub or further irritate the area.

## 2024-04-07 NOTE — ED PROVIDER NOTES
Encounter Date: 4/6/2024    SCRIBE #1 NOTE: I, Cathy Garcia, am scribing for, and in the presence of,  Isa Espinoza PA-C. I have scribed the following portions of the note - Other sections scribed: HPI, ROS.       History     Chief Complaint   Patient presents with    Otalgia     Pt chief complaint is ear pain. Pt states has left outer pain, pt does not have hearing issues just the outer ear.      53 y.o. female with PMHx of bipolar 1 disorder, asthma, COPD, manic depression, seizures, subtance abuse, presents for emergent evaluation of 'unberable' left, outer ear otalgia X 3 days. Patient reports that she has had similar issues with her ear over the last 2 years. Patient also reports a fever (Tmax 101.1F), sensation of pressure and fullness to ear, nausea/vomiting, and difficulty sleeping due to pain. She also reports yellow-green malodorous drainage from the ear that began a month ago but is progressively worsening in house is heated with pain especially worsened with palpation. Patient has taken over-the-counter medications PTA (about 3 hours ago) with no relief of pain.  States she has not been able to tolerate anything p.o. Patient reports applying topical bactrim to the area with no relief.  She also reports intermittent warm and good amount of drainage and relief of pressure when doing so.  Patient denies issues hearing, chest pain, shortness of breath, abdominal pain, urinary concerns, neck stiffness, or any other complaints at this time. Patient states symptoms originally began June 2023 and underwent surgery with Dr. Galarza December 2023 with intermittent relief of pain but states drainage has persisted.  She denies any ear trauma, drainage from internal canal, inner ear pain, makes, chest pain, shortness of breath or any other complaints at this time.        The history is provided by the patient. No  was used.     Review of patient's allergies indicates:   Allergen Reactions     Grass pollen-june grass standard Itching     Past Medical History:   Diagnosis Date    Asthma     Bipolar 1 disorder     History of MRSA infection     Manic depression     Seizures      Past Surgical History:   Procedure Laterality Date    APPENDECTOMY      CARPAL TUNNEL RELEASE      COSMETIC SURGERY      breast implants     HERNIA REPAIR      HYSTERECTOMY      INSERTION OF BREAST IMPLANT      KNEE ARTHROPLASTY Right 6/1/2020    Procedure: ARTHROPLASTY, KNEE;  Surgeon: Vasiliy Ivory MD;  Location: [facility];  Service: Orthopedics;  Laterality: Right;     Family History   Problem Relation Age of Onset    Schizophrenia Mother     Diabetes Father     Heart disease Father      Social History     Tobacco Use    Smoking status: Every Day     Current packs/day: 0.50     Average packs/day: 0.5 packs/day for 30.0 years (15.0 ttl pk-yrs)     Types: Cigarettes    Smokeless tobacco: Never   Substance Use Topics    Alcohol use: No    Drug use: Yes     Types: Marijuana     Comment: last smoked today, states this is prescribed to me     Review of Systems   Constitutional:  Positive for appetite change and fever. Negative for chills and diaphoresis.   HENT:  Positive for ear discharge (Left., yellow-green, malodorous) and ear pain (Left., sensation of pressure). Negative for congestion, facial swelling, hearing loss, rhinorrhea, sinus pressure, sinus pain, sore throat and trouble swallowing.    Eyes:  Negative for photophobia, pain and visual disturbance.   Respiratory:  Negative for cough and shortness of breath.    Cardiovascular:  Negative for chest pain.   Gastrointestinal:  Positive for nausea and vomiting. Negative for abdominal pain, blood in stool, constipation and diarrhea.   Genitourinary:  Negative for dysuria, flank pain, frequency, hematuria and urgency.   Musculoskeletal:  Negative for myalgias.   Skin:  Positive for wound. Negative for rash.   Neurological:  Negative for weakness, numbness and headaches.        Physical Exam     Initial Vitals [04/06/24 1926]   BP Pulse Resp Temp SpO2   (!) 166/92 (!) 118 18 98.4 °F (36.9 °C) 98 %      MAP       --         Physical Exam    Nursing note and vitals reviewed.  Constitutional: She appears well-developed and well-nourished. She is not diaphoretic. No distress.   HENT:   Head: Normocephalic and atraumatic.   Right Ear: External ear normal.   Mouth/Throat: Oropharynx is clear and moist. No oropharyngeal exudate.   Left auricle erythema and edema noted with slight increased warmth.  Purulent drainage noted from pinpoint size pit with palpation.    Bilateral tympanic membranes are pearly gray without erythema, bulging, perforation.  There is no postauricular swelling, or overlying erythema or tenderness to palpation over mastoids bilaterally.      Eyes: Conjunctivae and EOM are normal. Right eye exhibits no discharge. Left eye exhibits no discharge. No scleral icterus.   Neck: Neck supple. No tracheal deviation present.   Normal range of motion.  Cardiovascular:  Normal rate, regular rhythm and normal heart sounds.           Pulmonary/Chest: Breath sounds normal. No respiratory distress. She has no wheezes.   Abdominal: Abdomen is soft. Bowel sounds are normal. She exhibits no distension. There is no abdominal tenderness. There is no rebound.   Musculoskeletal:         General: Normal range of motion.      Cervical back: Normal range of motion and neck supple.     Lymphadenopathy:     She has no cervical adenopathy.   Neurological: She is alert and oriented to person, place, and time. She has normal strength. GCS score is 15. GCS eye subscore is 4. GCS verbal subscore is 5. GCS motor subscore is 6.   Skin: Skin is warm and dry. Capillary refill takes less than 2 seconds. No rash noted.   Psychiatric: She has a normal mood and affect. Thought content normal.         ED Course   Procedures  Labs Reviewed   CBC W/ AUTO DIFFERENTIAL - Abnormal; Notable for the following  components:       Result Value    RBC 3.94 (*)     Hematocrit 36.9 (*)     MCH 31.7 (*)     MPV 8.7 (*)     All other components within normal limits   COMPREHENSIVE METABOLIC PANEL - Abnormal; Notable for the following components:    Chloride 112 (*)     Anion Gap 7 (*)     All other components within normal limits   CULTURE, AEROBIC  (SPECIFY SOURCE)          Imaging Results              CT Temporal Bone with Contrast (Final result)  Result time 04/06/24 22:49:22      Final result by Jennifer Damian MD (04/06/24 22:49:22)                   Impression:      1. Mild asymmetric soft tissue thickening of the left external ear.  Similar appearance was seen on previous CT from November 2023.  No abscess seen.  2. No evidence of mastoiditis.      Electronically signed by: Jennifer Damian MD  Date:    04/06/2024  Time:    22:49               Narrative:    EXAMINATION:  CT TEMPORAL BONE WITH CONTRAST    CLINICAL HISTORY:  Mastoiditis;    TECHNIQUE:  Axial images were acquired through the temporal bones and skull base before and following administration of 75 cc Omnipaque 350 IV contrast.  Coronal and sagittal reconstructions were performed.    COMPARISON:  CT head from November 2023.    FINDINGS:  Right Temporal Bone:    *External ear: Normal.  *Middle ear: Normal.  *Petrous temporal bone/mastoid air cells: Clear. No fracture.  *Inner ear: Normal.  *IAC/CPA: Normal.  *Other: N/A.  .    Left Temporal Bone:    *External ear: Asymmetric mild soft tissue thickening.  *Middle ear: Normal.  *Petrous temporal bone/mastoid air cells: Clear. No fracture.  *Inner ear: Normal.  *IAC/CPA: Normal.  *Other: N/A.  Visualized portion of the brain shows no acute abnormalities.    Orbits are normal in appearance.    Visualized paranasal sinuses are clear.                                       Medications   sodium chloride 0.9% bolus 1,000 mL 1,000 mL (1,000 mLs Intravenous New Bag 4/6/24 2056)   ondansetron injection 4 mg (4 mg  Intravenous Given 4/6/24 2055)   cefTRIAXone (ROCEPHIN) 2 g in dextrose 5 % in water (D5W) 100 mL IVPB (MB+) (2 g Intravenous New Bag 4/6/24 2051)   ketorolac injection 30 mg (30 mg Intravenous Given 4/6/24 2058)   iohexoL (OMNIPAQUE 350) injection 75 mL (75 mLs Intravenous Given 4/6/24 2218)     Medical Decision Making  This is an emergent evaluation of a 53 y.o. female presenting to the ED for external otalgia X 3 days. Denies trauma, fever, drainage from ear, and hearing loss. Afebrile. Patient is non-toxic. On exam, left auricle erythema and edema noted with slight increased warmth.  Purulent drainage noted from pinpoint size pit with palpation.  Bilateral tympanic membranes are pearly gray without erythema, bulging, perforation.  There is no postauricular swelling, or overlying erythema or tenderness to palpation over mastoids bilaterally.     CBC without leukocytosis.  Hemoglobin and hematocrit stable. CMP unremarkable without significant electrolyte derangement, impaired renal function, or elevated LFTs.  CT left temporal bone with contrast obtained with mild soft tissue thickening external ear similar to appearance seen on CT November of 2023.  No abscess visualized.  No evidence of mastoiditis.    Pain controlled in ED. Given dose of IV antibiotics.  Tolerating PO without difficulty.    Presentation consistent with perichondritis. No palpable area of drainage to require I and D at this time.  Wound culture obtained.  No TM effusion.  Patient has follow up with ENT.  Discussed returning to the emergency room if swelling worsens, pain worsens or other concerns.   Discharged home with antibiotics while culture pens. We will contact if secondary medication as needed.  Advised on supportive care. Instructed to follow up with PCP and ENT for reevaluation and management of symptoms.     I discussed with the patient the diagnosis, treatment plan, indications for return to the emergency department, and for expected  follow-up. The patient verbalized an understanding. The patient is asked if there are any questions or concerns. We discuss the case, until all issues are addressed to the patient's satisfaction. Patient understands and is agreeable to the plan.     Amount and/or Complexity of Data Reviewed  External Data Reviewed: labs, radiology and notes.  Labs: ordered. Decision-making details documented in ED Course.  Radiology: ordered. Decision-making details documented in ED Course.    Risk  OTC drugs.  Prescription drug management.  Diagnosis or treatment significantly limited by social determinants of health.            Scribe Attestation:   Scribe #1: I performed the above scribed service and the documentation accurately describes the services I performed. I attest to the accuracy of the note.        ED Course as of 04/10/24 1419   Sat Apr 06, 2024   2114 WBC: 5.87 [CC]   2302 CT Temporal Bone with Contrast  Mild asymmetric soft tissue thickening of the left external ear.  Similar appearance was seen on previous CT from November 2023.  No abscess seen. [CC]      ED Course User Index  [CC] Isa Espinoza PA-C                       I, Isa Espinoza PA-C, personally performed the services described in this documentation. All medical record entries made by the scribe were at my direction and in my presence. I have reviewed the chart and agree that the record reflects my personal performance and is accurate and complete.      Clinical Impression:  Final diagnoses:  [H61.002] Perichondritis of auricle, left (Primary)          ED Disposition Condition    Discharge Stable          ED Prescriptions       Medication Sig Dispense Start Date End Date Auth. Provider    clindamycin (CLEOCIN) 150 MG capsule Take 3 capsules (450 mg total) by mouth 3 (three) times daily. for 7 days 63 capsule 4/6/2024 4/13/2024 Isa Espinoza PA-C    ondansetron (ZOFRAN-ODT) 4 MG TbDL Take 1 tablet (4 mg total) by mouth every 8 (eight) hours as  needed (nausea). 15 tablet 2024 -- Isa Espinoza PA-C    ibuprofen (ADVIL,MOTRIN) 800 MG tablet Take 1 tablet (800 mg total) by mouth every 6 (six) hours as needed for Pain. 20 tablet 2024 -- Isa Espinoza PA-C    mupirocin (BACTROBAN) 2 % ointment Apply topically 2 (two) times daily. 15 g 2024 -- Isa Espinoza PA-C    fluconazole (DIFLUCAN) 200 MG Tab () Take 1 tablet (200 mg total) by mouth once. for 1 dose 1 tablet 2024 Isa Espinoza PA-C          Follow-up Information       Follow up With Specialties Details Why Contact Info    [facility] - Emergency Dept Emergency Medicine Go to  For new or worsening symptoms [address] [facility] [place] [id number] [phone]    [facility] -    [address] [place] [address]  [address]  [facility] [address]  [phone]               Isa Espinoza PA-C  04/10/24 8507

## 2024-04-08 LAB — BACTERIA SPEC AEROBE CULT: NORMAL

## 2024-07-02 ENCOUNTER — HOSPITAL ENCOUNTER (EMERGENCY)
Facility: HOSPITAL | Age: 54
Discharge: HOME | End: 2024-07-03
Attending: STUDENT IN AN ORGANIZED HEALTH CARE EDUCATION/TRAINING PROGRAM
Payer: MEDICAID

## 2024-07-02 DIAGNOSIS — H60.12 CELLULITIS OF LEFT EAR: Primary | ICD-10-CM

## 2024-07-02 LAB
ANION GAP SERPL CALC-SCNC: 14 MMOL/L (ref 8–16)
BASOPHILS # BLD AUTO: 0.05 K/UL (ref 0–0.2)
BASOPHILS NFR BLD: 0.7 % (ref 0–1.9)
BUN SERPL-MCNC: 17 MG/DL (ref 6–20)
CALCIUM SERPL-MCNC: 9.5 MG/DL (ref 8.7–10.5)
CHLORIDE SERPL-SCNC: 106 MMOL/L (ref 95–110)
CO2 SERPL-SCNC: 19 MMOL/L (ref 23–29)
CREAT SERPL-MCNC: 0.8 MG/DL (ref 0.5–1.4)
DIFFERENTIAL METHOD BLD: ABNORMAL
EOSINOPHIL # BLD AUTO: 0.3 K/UL (ref 0–0.5)
EOSINOPHIL NFR BLD: 3.6 % (ref 0–8)
ERYTHROCYTE [DISTWIDTH] IN BLOOD BY AUTOMATED COUNT: 12.2 % (ref 11.5–14.5)
EST. GFR  (NO RACE VARIABLE): >60 ML/MIN/1.73 M^2
GLUCOSE SERPL-MCNC: 77 MG/DL (ref 70–110)
HCT VFR BLD AUTO: 37.5 % (ref 37–48.5)
HGB BLD-MCNC: 13.4 G/DL (ref 12–16)
IMM GRANULOCYTES # BLD AUTO: 0.04 K/UL (ref 0–0.04)
IMM GRANULOCYTES NFR BLD AUTO: 0.6 % (ref 0–0.5)
LYMPHOCYTES # BLD AUTO: 2.8 K/UL (ref 1–4.8)
LYMPHOCYTES NFR BLD: 40.7 % (ref 18–48)
MCH RBC QN AUTO: 32.1 PG (ref 27–31)
MCHC RBC AUTO-ENTMCNC: 35.7 G/DL (ref 32–36)
MCV RBC AUTO: 90 FL (ref 82–98)
MONOCYTES # BLD AUTO: 0.5 K/UL (ref 0.3–1)
MONOCYTES NFR BLD: 7.8 % (ref 4–15)
NEUTROPHILS # BLD AUTO: 3.3 K/UL (ref 1.8–7.7)
NEUTROPHILS NFR BLD: 46.6 % (ref 38–73)
NRBC BLD-RTO: 0 /100 WBC
PLATELET # BLD AUTO: 208 K/UL (ref 150–450)
PMV BLD AUTO: 9.3 FL (ref 9.2–12.9)
POTASSIUM SERPL-SCNC: 3.7 MMOL/L (ref 3.5–5.1)
RBC # BLD AUTO: 4.17 M/UL (ref 4–5.4)
SODIUM SERPL-SCNC: 139 MMOL/L (ref 136–145)
WBC # BLD AUTO: 6.96 K/UL (ref 3.9–12.7)

## 2024-07-02 PROCEDURE — 96374 THER/PROPH/DIAG INJ IV PUSH: CPT

## 2024-07-02 PROCEDURE — 63600175 PHARM REV CODE 636 W HCPCS: Performed by: STUDENT IN AN ORGANIZED HEALTH CARE EDUCATION/TRAINING PROGRAM

## 2024-07-02 PROCEDURE — 85025 COMPLETE CBC W/AUTO DIFF WBC: CPT | Performed by: STUDENT IN AN ORGANIZED HEALTH CARE EDUCATION/TRAINING PROGRAM

## 2024-07-02 PROCEDURE — 99285 EMERGENCY DEPT VISIT HI MDM: CPT | Mod: 25

## 2024-07-02 PROCEDURE — 96375 TX/PRO/DX INJ NEW DRUG ADDON: CPT

## 2024-07-02 PROCEDURE — 80048 BASIC METABOLIC PNL TOTAL CA: CPT | Performed by: STUDENT IN AN ORGANIZED HEALTH CARE EDUCATION/TRAINING PROGRAM

## 2024-07-02 PROCEDURE — 25000003 PHARM REV CODE 250: Performed by: STUDENT IN AN ORGANIZED HEALTH CARE EDUCATION/TRAINING PROGRAM

## 2024-07-02 RX ORDER — KETOROLAC TROMETHAMINE 30 MG/ML
15 INJECTION, SOLUTION INTRAMUSCULAR; INTRAVENOUS
Status: COMPLETED | OUTPATIENT
Start: 2024-07-02 | End: 2024-07-02

## 2024-07-02 RX ORDER — ONDANSETRON HYDROCHLORIDE 2 MG/ML
4 INJECTION, SOLUTION INTRAVENOUS
Status: COMPLETED | OUTPATIENT
Start: 2024-07-02 | End: 2024-07-02

## 2024-07-02 RX ORDER — GABAPENTIN 300 MG/1
300 CAPSULE ORAL 3 TIMES DAILY
Status: DISCONTINUED | OUTPATIENT
Start: 2024-07-02 | End: 2024-07-03 | Stop reason: HOSPADM

## 2024-07-02 RX ORDER — ACETAMINOPHEN 325 MG/1
650 TABLET ORAL
Status: COMPLETED | OUTPATIENT
Start: 2024-07-02 | End: 2024-07-02

## 2024-07-02 RX ADMIN — GABAPENTIN 300 MG: 300 CAPSULE ORAL at 11:07

## 2024-07-02 RX ADMIN — KETOROLAC TROMETHAMINE 15 MG: 30 INJECTION, SOLUTION INTRAMUSCULAR; INTRAVENOUS at 11:07

## 2024-07-02 RX ADMIN — ONDANSETRON 4 MG: 2 INJECTION INTRAMUSCULAR; INTRAVENOUS at 11:07

## 2024-07-02 RX ADMIN — ACETAMINOPHEN 650 MG: 325 TABLET ORAL at 11:07

## 2024-07-03 VITALS
BODY MASS INDEX: 24.8 KG/M2 | TEMPERATURE: 98 F | WEIGHT: 127 LBS | HEART RATE: 97 BPM | OXYGEN SATURATION: 97 % | SYSTOLIC BLOOD PRESSURE: 148 MMHG | DIASTOLIC BLOOD PRESSURE: 87 MMHG | RESPIRATION RATE: 15 BRPM

## 2024-07-03 PROCEDURE — 25000003 PHARM REV CODE 250: Performed by: EMERGENCY MEDICINE

## 2024-07-03 PROCEDURE — 25500020 PHARM REV CODE 255: Performed by: STUDENT IN AN ORGANIZED HEALTH CARE EDUCATION/TRAINING PROGRAM

## 2024-07-03 RX ORDER — DOXYCYCLINE HYCLATE 100 MG
100 TABLET ORAL
Status: COMPLETED | OUTPATIENT
Start: 2024-07-03 | End: 2024-07-03

## 2024-07-03 RX ORDER — DOXYCYCLINE 100 MG/1
100 CAPSULE ORAL 2 TIMES DAILY
Qty: 13 CAPSULE | Refills: 0 | Status: SHIPPED | OUTPATIENT
Start: 2024-07-03 | End: 2024-07-10

## 2024-07-03 RX ADMIN — DOXYCYCLINE HYCLATE 100 MG: 100 TABLET, COATED ORAL at 02:07

## 2024-07-03 RX ADMIN — IOHEXOL 75 ML: 350 INJECTION, SOLUTION INTRAVENOUS at 12:07

## 2024-07-03 NOTE — PROVIDER PROGRESS NOTES - EMERGENCY DEPT.
Encounter Date: 7/2/2024    ED Physician Progress Notes            Vitals:    07/03/24 0135   BP: (!) 148/87   Pulse: 97   Resp: 15   Temp: 98.2 °F (36.8 °C)     Patient received in sign-out from Dr. Martinez pending CT scan of the temporal bones to rule out evidence of mastoiditis other gross abnormality.  Labs appear within normal limits.  CT scan are negative for evidence of bony invasion or other concerning infectious sequela of suspected left ear cellulitis.  On final reassessment patient was seen sleeping comfortably.  She is educated about supportive care for suspected bacterial cellulitis and will be placed on doxycycline for empiric coverage of MRSA.  Asked to follow up with her primary care doctor/specialists as soon as possible or to return to the emergency department immediately for any new, concerning, or worsening symptoms.  Patient was agreeable with this plan and was discharged in stable condition.

## 2024-07-03 NOTE — ED PROVIDER NOTES
Chief Complaint   Otalgia (Patient with pain to the outer ear. States she has had surgery to the ear before. Ear with swelling and redness. States that she has had fever at home.)      History Of Present Illness   Alina Menchaca is a 54 y.o. female with a PMHx including bipolar disorder, depression, history of substance abuse, COPD  presenting with ear pain.  Patient states that she was had recurrent infections to her left ear over the past 3 years.  States that she was had surgery with an ENT (Dr. Galarza) in the past for the same.  Was seen in April most recently for similar complaints and diagnosed with perichondritis.  Was discharged on clindamycin.  Patient states that she was also been on ciprofloxacin more recently completed that about a week ago.  States that over the past 2 days she has had increasing pain to her left ear, swelling, and some bloody/purulent drainage.  She reports subjective fevers but denies any chills, chest pain, shortness of breath, abdominal pain, vomiting.  She was reports some occasional nausea.  She reports no ringing in her ears or loss of hearing.    Independent Historian: Yes  Other Historian or Collateral: Chart review  Interpretor: No      Review of patient's allergies indicates:   Allergen Reactions    Grass pollen-june grass standard Itching       No current facility-administered medications on file prior to encounter.     Current Outpatient Medications on File Prior to Encounter   Medication Sig Dispense Refill    albuterol (PROVENTIL) 2.5 mg /3 mL (0.083 %) nebulizer solution Take 3 mLs (2.5 mg total) by nebulization every 6 (six) hours as needed for Wheezing. 30 Box 0    aspirin 81 MG Chew Take 1 tablet (81 mg total) by mouth once daily.  0    cetirizine (ZYRTEC) 10 MG tablet Take 1 tablet (10 mg total) by mouth once daily. for 12 days 12 tablet 0    divalproex (DEPAKOTE) 250 MG EC tablet Take 1 tablet (250 mg total) by mouth every 8 (eight) hours. 90 tablet 3     FLUoxetine 20 MG capsule Take 40 mg by mouth once daily.      fluticasone-vilanterol (BREO) 100-25 mcg/dose diskus inhaler Inhale 1 puff into the lungs once daily. Controller 60 each 3    HYDROcodone-acetaminophen (NORCO) 5-325 mg per tablet Take 1 tablet by mouth every 6 (six) hours as needed for Pain. 10 tablet 0    ibuprofen (ADVIL,MOTRIN) 800 MG tablet Take 1 tablet (800 mg total) by mouth every 6 (six) hours as needed for Pain. 20 tablet 0    ketorolac (TORADOL) 10 mg tablet Take 1 tablet (10 mg total) by mouth every 6 (six) hours as needed for Pain. Take with food. 20 tablet 1    lidocaine (LIDODERM) 5 % Place 1 patch onto the skin once daily. Remove & Discard patch within 12 hours or as directed by MD 15 patch 0    magnesium oxide (MAG-OXIDE ORAL) Take by mouth once daily.      meclizine (ANTIVERT) 25 mg tablet Take 1 tablet (25 mg total) by mouth 3 (three) times daily as needed for Dizziness or Nausea. 30 tablet 0    mupirocin (BACTROBAN) 2 % ointment Apply topically 2 (two) times daily. 15 g 0    nicotine (NICODERM CQ) 14 mg/24 hr Place 1 patch onto the skin once daily. 30 patch 1    ondansetron (ZOFRAN) 8 MG tablet Take 1 tablet (8 mg total) by mouth every 8 (eight) hours as needed for Nausea. 15 tablet 0    ondansetron (ZOFRAN-ODT) 4 MG TbDL Take 1 tablet (4 mg total) by mouth every 8 (eight) hours as needed (nausea). 15 tablet 0    QUEtiapine (SEROQUEL XR) 300 MG Tb24 Take 300 mg by mouth every evening.      venlafaxine (EFFEXOR-XR) 150 MG Cp24 Take 150 mg by mouth once daily.      vitamin E 100 UNIT capsule Take 200 Units by mouth once daily.         Past History   As per HPI and below:  Past Medical History:   Diagnosis Date    Asthma     Bipolar 1 disorder     History of MRSA infection     Manic depression     Seizures      Past Surgical History:   Procedure Laterality Date    APPENDECTOMY      CARPAL TUNNEL RELEASE      COSMETIC SURGERY      breast implants     HERNIA REPAIR      HYSTERECTOMY       INSERTION OF BREAST IMPLANT      KNEE ARTHROPLASTY Right 6/1/2020    Procedure: ARTHROPLASTY, KNEE;  Surgeon: Vasiliy Ivory MD;  Location: [facility];  Service: Orthopedics;  Laterality: Right;       Social History     Socioeconomic History    Marital status: Single   Tobacco Use    Smoking status: Every Day     Current packs/day: 0.50     Average packs/day: 0.5 packs/day for 30.0 years (15.0 ttl pk-yrs)     Types: Cigarettes    Smokeless tobacco: Never   Substance and Sexual Activity    Alcohol use: No    Drug use: Yes     Types: Marijuana     Comment: last smoked today, states this is prescribed to me    Sexual activity: Yes     Partners: Female       Family History   Problem Relation Name Age of Onset    Schizophrenia Mother      Diabetes Father      Heart disease Father         Physical Exam     Vitals:    07/02/24 2136 07/03/24 0135   BP: (!) 144/78 (!) 148/87   BP Location: Left arm    Patient Position: Sitting    Pulse: 102 97   Resp: 18 15   Temp: 97.9 °F (36.6 °C) 98.2 °F (36.8 °C)   TempSrc: Oral Oral   SpO2: 96% 97%   Weight: 57.6 kg (127 lb)        Physical Exam  Vitals reviewed.   Constitutional:       General: She is not in acute distress.     Appearance: Normal appearance. She is not toxic-appearing.   HENT:      Head: Normocephalic and atraumatic.        Comments: Swelling and erythema noted to the left auricle.  Serosanguineous drainage expressed on exam but no purulence noted.  There is no postauricular swelling, or overlying erythema or tenderness to palpation over mastoids bilaterally.       Right Ear: External ear normal.      Nose: No congestion.      Mouth/Throat:      Mouth: Mucous membranes are moist.   Eyes:      General: No scleral icterus.     Extraocular Movements: Extraocular movements intact.      Pupils: Pupils are equal, round, and reactive to light.   Cardiovascular:      Rate and Rhythm: Normal rate and regular rhythm.   Pulmonary:      Effort: No respiratory distress.      Breath  sounds: No stridor.   Abdominal:      General: There is no distension.      Palpations: Abdomen is soft.      Tenderness: There is no abdominal tenderness.   Musculoskeletal:         General: No deformity.      Cervical back: No rigidity.   Skin:     General: Skin is warm and dry.      Capillary Refill: Capillary refill takes less than 2 seconds.   Neurological:      General: No focal deficit present.      Mental Status: She is alert and oriented to person, place, and time.                   Results     Labs Reviewed   CBC W/ AUTO DIFFERENTIAL - Abnormal; Notable for the following components:       Result Value    MCH 32.1 (*)     Immature Granulocytes 0.6 (*)     All other components within normal limits   BASIC METABOLIC PANEL - Abnormal; Notable for the following components:    CO2 19 (*)     All other components within normal limits       Imaging Results              CT Temporal Bone with Contrast (Final result)  Result time 07/03/24 01:42:47      Final result by Rj Robertson MD (07/03/24 01:42:47)                   Impression:      Stable CT of the mastoids and temporal bones with no new mastoiditis changes.    Stable thickening of the external ear.    No new drainable fluid collection or adenopathy.      Electronically signed by: Rj Robertson  Date:    07/03/2024  Time:    01:42               Narrative:    EXAMINATION:  CT TEMPORAL BONE WITH CONTRAST    CLINICAL HISTORY:  Mastoiditis;    TECHNIQUE:  Axial CT images of the skull base was perform with angled images of the mastoids in temporal bones.  75 cc of Omnipaque 350 contrast was administered for the exam.    COMPARISON:  CT mastoids, 04/06/2022    FINDINGS:  Mastoid air cells remain clear.  There is no bone erosion.  No soft tissue mass.  Sigmoid plate intact.  The middle ear and external auditory canals are clear on the left with minimal cerumen in the right external auditory canal.    Middle ear bones appear intact.  Tympanic membrane is not  thickened.  Middle ear complex appears normal.  CP angle appears unremarkable.    There is thickening of the pinna of the left ear when compared to the right.  No lymph node enlargement.  No drainable fluid collection.                                            Initial MDM   Medical Decision Making  Patient is a 54-year-old female presenting with recurrent perichondritis.  She just recently completed antibiotics about a week ago.  States her next appointment with the ENT isn't until September.  Consider mastoiditis the lower suspicion at this time.  Will get labs including wound culture of the drainage.  Will get CT temporal bone to further evaluate for mastoiditis.    Amount and/or Complexity of Data Reviewed  Labs: ordered.  Radiology: ordered.    Risk  OTC drugs.  Prescription drug management.               Medications Given / Interventions     Medications   ondansetron injection 4 mg (4 mg Intravenous Given 7/2/24 2331)   ketorolac injection 15 mg (15 mg Intravenous Given 7/2/24 2331)   acetaminophen tablet 650 mg (650 mg Oral Given 7/2/24 2329)   iohexoL (OMNIPAQUE 350) injection 75 mL (75 mLs Intravenous Given 7/3/24 0031)   doxycycline tablet 100 mg (100 mg Oral Given 7/3/24 0201)       Procedures     ED POCUS Performed: No    Reassessment and ED Course     ED Course as of 07/03/24 2031 Tue Jul 02, 2024   2349 CBC grossly okay, BMP grossly okay [CH]      ED Course User Index  [CH] Jolynn Martinez MD              Final diagnoses:  [H60.12] Cellulitis of left ear (Primary)           Dispo      ED Disposition Condition    Discharge Stable            ED Prescriptions       Medication Sig Dispense Start Date End Date Auth. Provider    doxycycline (VIBRAMYCIN) 100 MG Cap Take 1 capsule (100 mg total) by mouth 2 (two) times daily. for 7 days 13 capsule 7/3/2024 7/10/2024 Noel Donahue MD          Follow-up Information       Follow up With Specialties Details Why Contact Info    Yessenia Krueger  [facility] -  Schedule an appointment as soon as possible for a visit   [address]  [facility] [address]  [phone]                          Jolynn Martinez MD  07/03/24 203

## 2024-07-03 NOTE — ED TRIAGE NOTES
Pt states that she had what she thinks was a bite after  Flore[place] that the dr has cut open on her ear twice. She states that it has drainage and stinks. Left ear is swollen and red. Pt has been on antibiotics multiple times for 3 years.

## 2024-10-04 ENCOUNTER — HOSPITAL ENCOUNTER (EMERGENCY)
Facility: HOSPITAL | Age: 54
Discharge: HOME | End: 2024-10-04
Attending: EMERGENCY MEDICINE
Payer: MEDICAID

## 2024-10-04 VITALS
RESPIRATION RATE: 16 BRPM | DIASTOLIC BLOOD PRESSURE: 78 MMHG | HEART RATE: 80 BPM | WEIGHT: 130 LBS | OXYGEN SATURATION: 97 % | SYSTOLIC BLOOD PRESSURE: 145 MMHG | TEMPERATURE: 98 F | BODY MASS INDEX: 25.39 KG/M2

## 2024-10-04 DIAGNOSIS — J44.1 COPD WITH ACUTE EXACERBATION: Primary | ICD-10-CM

## 2024-10-04 LAB
CTP QC/QA: YES
MOLECULAR STREP A: NEGATIVE
POC MOLECULAR INFLUENZA A AGN: NEGATIVE
POC MOLECULAR INFLUENZA B AGN: NEGATIVE
SARS-COV-2 RDRP RESP QL NAA+PROBE: NEGATIVE

## 2024-10-04 PROCEDURE — 63600175 PHARM REV CODE 636 W HCPCS

## 2024-10-04 PROCEDURE — 99284 EMERGENCY DEPT VISIT MOD MDM: CPT | Mod: 25

## 2024-10-04 PROCEDURE — 25000003 PHARM REV CODE 250

## 2024-10-04 PROCEDURE — 63700000 PHARM REV CODE 250 ALT 637 W/O HCPCS

## 2024-10-04 PROCEDURE — 87502 INFLUENZA DNA AMP PROBE: CPT

## 2024-10-04 PROCEDURE — 25000242 PHARM REV CODE 250 ALT 637 W/ HCPCS

## 2024-10-04 PROCEDURE — 94760 N-INVAS EAR/PLS OXIMETRY 1: CPT

## 2024-10-04 PROCEDURE — 94644 CONT INHLJ TX 1ST HOUR: CPT

## 2024-10-04 PROCEDURE — 87635 SARS-COV-2 COVID-19 AMP PRB: CPT

## 2024-10-04 PROCEDURE — 87651 STREP A DNA AMP PROBE: CPT

## 2024-10-04 RX ORDER — AZITHROMYCIN 250 MG/1
500 TABLET, FILM COATED ORAL DAILY
Qty: 3 TABLET | Refills: 0 | Status: SHIPPED | OUTPATIENT
Start: 2024-10-04

## 2024-10-04 RX ORDER — IPRATROPIUM BROMIDE AND ALBUTEROL SULFATE 2.5; .5 MG/3ML; MG/3ML
3 SOLUTION RESPIRATORY (INHALATION)
Status: DISCONTINUED | OUTPATIENT
Start: 2024-10-04 | End: 2024-10-04

## 2024-10-04 RX ORDER — ONDANSETRON 4 MG/1
4 TABLET, ORALLY DISINTEGRATING ORAL
Status: COMPLETED | OUTPATIENT
Start: 2024-10-04 | End: 2024-10-04

## 2024-10-04 RX ORDER — PREDNISONE 20 MG/1
60 TABLET ORAL
Status: COMPLETED | OUTPATIENT
Start: 2024-10-04 | End: 2024-10-04

## 2024-10-04 RX ORDER — PREDNISONE 20 MG/1
40 TABLET ORAL DAILY
Qty: 10 TABLET | Refills: 0 | Status: SHIPPED | OUTPATIENT
Start: 2024-10-04 | End: 2024-10-09

## 2024-10-04 RX ORDER — AZITHROMYCIN 250 MG/1
500 TABLET, FILM COATED ORAL
Status: COMPLETED | OUTPATIENT
Start: 2024-10-04 | End: 2024-10-04

## 2024-10-04 RX ORDER — IPRATROPIUM BROMIDE AND ALBUTEROL SULFATE 2.5; .5 MG/3ML; MG/3ML
9 SOLUTION RESPIRATORY (INHALATION) ONCE
Status: COMPLETED | OUTPATIENT
Start: 2024-10-04 | End: 2024-10-04

## 2024-10-04 RX ADMIN — IPRATROPIUM BROMIDE AND ALBUTEROL SULFATE 9 ML: 2.5; .5 SOLUTION RESPIRATORY (INHALATION) at 06:10

## 2024-10-04 RX ADMIN — AZITHROMYCIN DIHYDRATE 500 MG: 250 TABLET ORAL at 06:10

## 2024-10-04 RX ADMIN — PREDNISONE 60 MG: 20 TABLET ORAL at 06:10

## 2024-10-04 RX ADMIN — ONDANSETRON 4 MG: 4 TABLET, ORALLY DISINTEGRATING ORAL at 06:10

## 2024-10-04 NOTE — ED PROVIDER NOTES
"Encounter Date: 10/4/2024    SCRIBE #1 NOTE: I, Kristina Mendenhall, am scribing for, and in the presence of,  Khadar Méndez PA-C. I have scribed the following portions of the note - Other sections scribed: HPI/ROS.       History     Chief Complaint   Patient presents with    Sinusitis     Non productive cough, exp wheeze (COPD, and asthma) afebrile. X1 week     54 y.o. female, with PMHx Asthma, Bipolar 1, COPD, history of substance abuse, who presents to the ED with cough onset 3 days. Patient reports grandchildren spent Monday and Tuesday with her and they had cold symptoms. Pt notes on yesterday she had a heavy head, and shortness of breath described as "swinging to breathe". Attempted treatment with Rose seltzer severe cold with mild alleviation of symptoms. Pt denies at home oxygen, but admits she is" supposed to be, but not" on oxygen.  She has required intubation for respiratory failure twice in the past.    The history is provided by the patient. No  was used.     Review of patient's allergies indicates:   Allergen Reactions    Grass pollen-june grass standard Itching     Past Medical History:   Diagnosis Date    Asthma     Bipolar 1 disorder     History of MRSA infection     Manic depression     Seizures      Past Surgical History:   Procedure Laterality Date    APPENDECTOMY      CARPAL TUNNEL RELEASE      COSMETIC SURGERY      breast implants     HERNIA REPAIR      HYSTERECTOMY      INSERTION OF BREAST IMPLANT      KNEE ARTHROPLASTY Right 6/1/2020    Procedure: ARTHROPLASTY, KNEE;  Surgeon: Vasiliy Ivory MD;  Location: [facility];  Service: Orthopedics;  Laterality: Right;     Family History   Problem Relation Name Age of Onset    Schizophrenia Mother      Diabetes Father      Heart disease Father       Social History     Tobacco Use    Smoking status: Every Day     Current packs/day: 0.50     Average packs/day: 0.5 packs/day for 30.0 years (15.0 ttl pk-yrs)     Types: Cigarettes    " Smokeless tobacco: Never   Substance Use Topics    Alcohol use: No    Drug use: Yes     Types: Marijuana     Comment: last smoked today, states this is prescribed to me     Review of Systems   Constitutional:  Negative for diaphoresis, fatigue and unexpected weight change.   HENT:  Positive for sinus pressure. Negative for sinus pain and sore throat.    Eyes:  Negative for pain, redness and visual disturbance.   Respiratory:  Positive for cough, shortness of breath and wheezing. Negative for chest tightness.    Cardiovascular:  Negative for chest pain and palpitations.   Gastrointestinal:  Negative for abdominal pain, blood in stool, diarrhea, nausea and vomiting.   Endocrine: Negative for polydipsia, polyphagia and polyuria.   Genitourinary:  Negative for dysuria, frequency and urgency.   Musculoskeletal:  Negative for arthralgias, back pain and myalgias.   Skin:  Negative for rash.   Allergic/Immunologic: Negative for environmental allergies.   Neurological:  Negative for dizziness, syncope and headaches.   Psychiatric/Behavioral:  Negative for suicidal ideas.        Physical Exam     Initial Vitals [10/04/24 1709]   BP Pulse Resp Temp SpO2   (!) 143/73 77 18 98.5 °F (36.9 °C) 97 %      MAP       --         Physical Exam    Nursing note and vitals reviewed.  Constitutional: Vital signs are normal. She appears well-developed and well-nourished. She is cooperative. She does not appear ill. No distress.   Well-appearing.  No acute distress.  Does not appear ill or toxic.   HENT:   Head: Normocephalic and atraumatic.   Right Ear: Hearing and external ear normal.   Left Ear: Hearing and external ear normal.   Nose: Nose normal.   Eyes: Conjunctivae and EOM are normal.   Neck: Phonation normal.   Normal range of motion.  Cardiovascular:  Normal rate and regular rhythm.           No murmur heard.  Regular rate and rhythm.  No murmur.  No friction rub   Pulmonary/Chest: Effort normal. No respiratory distress.   Diffuse  wheezing and rhonchi, questionably decreased air movement throughout.  Respirations were even and unlabored.  No accessory muscle use.  Patient coughed multiple times during my exam.   Abdominal: Abdomen is soft. She exhibits no distension. There is no abdominal tenderness.   Soft, nontender, nondistended. There is no rebound and no guarding.   Musculoskeletal:      Cervical back: Normal range of motion.     Neurological: She is alert and oriented to person, place, and time. GCS eye subscore is 4. GCS verbal subscore is 5. GCS motor subscore is 6.   Skin: Skin is warm. Capillary refill takes less than 2 seconds.         ED Course   Procedures  Labs Reviewed   POCT STREP A MOLECULAR       Result Value    Molecular Strep A, POC Negative       Acceptable Yes     SARS-COV-2 RDRP GENE    POC Rapid COVID Negative       Acceptable Yes     POCT INFLUENZA A/B MOLECULAR    POC Molecular Influenza A Ag Negative      POC Molecular Influenza B Ag Negative       Acceptable Yes            Imaging Results              X-Ray Chest PA And Lateral (Final result)  Result time 10/04/24 18:32:43      Final result by Jennifer Damian MD (10/04/24 18:32:43)                   Impression:      No acute cardiopulmonary process identified.      Electronically signed by: Jennifer Damian MD  Date:    10/04/2024  Time:    18:32               Narrative:    EXAMINATION:  XR CHEST PA AND LATERAL    CLINICAL HISTORY:  Asthma;    TECHNIQUE:  PA and lateral views of the chest were performed.    COMPARISON:  November 2020.    FINDINGS:  Cardiac silhouette is normal in size.  Lungs are symmetrically expanded.  No evidence of focal consolidative process, pneumothorax, or significant pleural effusion.  No acute osseous abnormality identified.                                       Medications   predniSONE tablet 60 mg (60 mg Oral Given 10/4/24 1835)   azithromycin tablet 500 mg (500 mg Oral Given 10/4/24 1835)    albuterol-ipratropium 2.5 mg-0.5 mg/3 mL nebulizer solution 9 mL (9 mLs Nebulization Given 10/4/24 1834)   ondansetron disintegrating tablet 4 mg (4 mg Oral Given 10/4/24 1852)     Medical Decision Making  54-year-old female presenting to the emergency department for upper respiratory symptoms including cough, shortness of breath.  History of COPD, has required intubation twice in the past.  She was not on oxygen at home.  On exam, she was well-appearing and in no acute distress.  Vitals are within normal limits.  Afebrile, no tachycardia, saturating greater than 95% on room air.  Lung exam with diffuse wheezing and rhonchi, questionably decreased air movement throughout.    Differential diagnosis includes but is not limited to respiratory infections including COVID, flu, bronchitis, rhinosinusitis, or pneumonia, or noninfectious processes such as asthma, COPD or seasonal allergies.     Patient tested negative for COVID, flu, and strep.  X-ray negative for any acute cardiopulmonary process.  Considered but doubt acute respiratory failure, ARDS, pneumonia.  Presentation consistent with mild COPD exacerbation secondary to viral URI.  Acute management in the emergency department with prednisone, DuoNebs, azithromycin, Zofran.  Patient reported significant improvement in her breathing after these treatments.  On reassessment, better air movement, improved wheezing.  Patient feels ready to go home.  Has an albuterol inhaler at home but does not have any inhalers with steroids.  I will start her on a 5 day course of steroids, 3 day course of azithromycin, and a Combivent inhaler.  Stable for discharge home to outpatient follow up with her pulmonologist.    Return precautions were discussed, all patient questions were answered, and the patient was agreeable to the plan of care.  She was discharged home in stable condition and will follow up with her primary care provider or return to the emergency department if her  symptoms worsen or do not improve.     Amount and/or Complexity of Data Reviewed  Radiology: ordered.    Risk  Prescription drug management.            Scribe Attestation:   Scribe #1: I performed the above scribed service and the documentation accurately describes the services I performed. I attest to the accuracy of the note.                               Clinical Impression:  Final diagnoses:  [J44.1] COPD with acute exacerbation (Primary)       I, Khadar Méndez PA-C, personally performed the services described in this documentation. All medical record entries made by the scribe were at my direction and in my presence. I have reviewed the chart and agree that the record reflects my personal performance and is accurate and complete.     ED Disposition Condition    Discharge Stable          ED Prescriptions       Medication Sig Dispense Start Date End Date Auth. Provider    azithromycin (Z-CY) 250 MG tablet Take 2 tablets (500 mg total) by mouth once daily. Take first 2 tablets together, then 1 every day until finished. 3 tablet 10/4/2024 -- Khadar Méndez PA-C    predniSONE (DELTASONE) 20 MG tablet Take 2 tablets (40 mg total) by mouth once daily. for 5 days 10 tablet 10/4/2024 10/9/2024 Khadar Méndez PA-C    ipratropium-albuteroL (COMBIVENT)  mcg/actuation inhaler Inhale 1 puff into the lungs every 6 (six) hours as needed for Wheezing. Rescue 4 g 10/4/2024 -- Khadar Méndez PA-C          Follow-up Information       Follow up With Specialties Details Why Contact Info    [facility] -  Schedule an appointment as soon as possible for a visit  As needed, If symptoms worsen [address]  [address]  [facility] [address]  [phone]               Khadar Méndez PA-C  10/04/24 1934

## 2024-10-05 NOTE — DISCHARGE INSTRUCTIONS

## 2024-10-19 ENCOUNTER — HOSPITAL ENCOUNTER (EMERGENCY)
Facility: HOSPITAL | Age: 54
Discharge: HOME | End: 2024-10-19
Attending: STUDENT IN AN ORGANIZED HEALTH CARE EDUCATION/TRAINING PROGRAM
Payer: MEDICAID

## 2024-10-19 VITALS
SYSTOLIC BLOOD PRESSURE: 116 MMHG | HEART RATE: 93 BPM | DIASTOLIC BLOOD PRESSURE: 54 MMHG | OXYGEN SATURATION: 100 % | RESPIRATION RATE: 20 BRPM | TEMPERATURE: 98 F

## 2024-10-19 DIAGNOSIS — F10.929 ALCOHOLIC INTOXICATION WITH COMPLICATION: Primary | ICD-10-CM

## 2024-10-19 LAB
ALBUMIN SERPL BCP-MCNC: 3.9 G/DL (ref 3.5–5.2)
ALLENS TEST: ABNORMAL
ALP SERPL-CCNC: 96 U/L (ref 40–150)
ALT SERPL W/O P-5'-P-CCNC: 18 U/L (ref 10–44)
ANION GAP SERPL CALC-SCNC: 17 MMOL/L (ref 8–16)
APAP SERPL-MCNC: <3 UG/ML (ref 10–20)
AST SERPL-CCNC: 27 U/L (ref 10–40)
BASOPHILS # BLD AUTO: 0.05 K/UL (ref 0–0.2)
BASOPHILS NFR BLD: 0.5 % (ref 0–1.9)
BILIRUB SERPL-MCNC: 0.6 MG/DL (ref 0.1–1)
BUN SERPL-MCNC: 14 MG/DL (ref 6–20)
CALCIUM SERPL-MCNC: 9.2 MG/DL (ref 8.7–10.5)
CHLORIDE SERPL-SCNC: 107 MMOL/L (ref 95–110)
CO2 SERPL-SCNC: 18 MMOL/L (ref 23–29)
CREAT SERPL-MCNC: 0.8 MG/DL (ref 0.5–1.4)
DELSYS: ABNORMAL
DIFFERENTIAL METHOD BLD: ABNORMAL
EOSINOPHIL # BLD AUTO: 0.1 K/UL (ref 0–0.5)
EOSINOPHIL NFR BLD: 1.4 % (ref 0–8)
ERYTHROCYTE [DISTWIDTH] IN BLOOD BY AUTOMATED COUNT: 11.9 % (ref 11.5–14.5)
EST. GFR  (NO RACE VARIABLE): >60 ML/MIN/1.73 M^2
ETHANOL SERPL-MCNC: 238 MG/DL
GLUCOSE SERPL-MCNC: 98 MG/DL (ref 70–110)
HCO3 UR-SCNC: 20.1 MMOL/L (ref 24–28)
HCT VFR BLD AUTO: 41 % (ref 37–48.5)
HGB BLD-MCNC: 14.4 G/DL (ref 12–16)
IMM GRANULOCYTES # BLD AUTO: 0.06 K/UL (ref 0–0.04)
IMM GRANULOCYTES NFR BLD AUTO: 0.6 % (ref 0–0.5)
LYMPHOCYTES # BLD AUTO: 2.7 K/UL (ref 1–4.8)
LYMPHOCYTES NFR BLD: 28.9 % (ref 18–48)
MCH RBC QN AUTO: 31.8 PG (ref 27–31)
MCHC RBC AUTO-ENTMCNC: 35.1 G/DL (ref 32–36)
MCV RBC AUTO: 91 FL (ref 82–98)
MONOCYTES # BLD AUTO: 0.6 K/UL (ref 0.3–1)
MONOCYTES NFR BLD: 6.7 % (ref 4–15)
NEUTROPHILS # BLD AUTO: 5.8 K/UL (ref 1.8–7.7)
NEUTROPHILS NFR BLD: 61.9 % (ref 38–73)
NRBC BLD-RTO: 0 /100 WBC
PCO2 BLDA: 37.8 MMHG (ref 35–45)
PH SMN: 7.33 [PH] (ref 7.35–7.45)
PLATELET # BLD AUTO: 224 K/UL (ref 150–450)
PMV BLD AUTO: 9 FL (ref 9.2–12.9)
PO2 BLDA: 69 MMHG (ref 40–60)
POC BE: -5 MMOL/L
POC SATURATED O2: 93 % (ref 95–100)
POC TCO2: 21 MMOL/L (ref 24–29)
POTASSIUM SERPL-SCNC: 3.3 MMOL/L (ref 3.5–5.1)
PROT SERPL-MCNC: 6.9 G/DL (ref 6–8.4)
RBC # BLD AUTO: 4.53 M/UL (ref 4–5.4)
SAMPLE: ABNORMAL
SITE: ABNORMAL
SODIUM SERPL-SCNC: 142 MMOL/L (ref 136–145)
TSH SERPL DL<=0.005 MIU/L-ACNC: 1.81 UIU/ML (ref 0.4–4)
WBC # BLD AUTO: 9.44 K/UL (ref 3.9–12.7)

## 2024-10-19 PROCEDURE — 82077 ASSAY SPEC XCP UR&BREATH IA: CPT | Performed by: STUDENT IN AN ORGANIZED HEALTH CARE EDUCATION/TRAINING PROGRAM

## 2024-10-19 PROCEDURE — 80053 COMPREHEN METABOLIC PANEL: CPT | Performed by: STUDENT IN AN ORGANIZED HEALTH CARE EDUCATION/TRAINING PROGRAM

## 2024-10-19 PROCEDURE — 99283 EMERGENCY DEPT VISIT LOW MDM: CPT

## 2024-10-19 PROCEDURE — 84443 ASSAY THYROID STIM HORMONE: CPT | Performed by: STUDENT IN AN ORGANIZED HEALTH CARE EDUCATION/TRAINING PROGRAM

## 2024-10-19 PROCEDURE — 80143 DRUG ASSAY ACETAMINOPHEN: CPT | Performed by: STUDENT IN AN ORGANIZED HEALTH CARE EDUCATION/TRAINING PROGRAM

## 2024-10-19 PROCEDURE — 99900035 HC TECH TIME PER 15 MIN (STAT)

## 2024-10-19 PROCEDURE — 85025 COMPLETE CBC W/AUTO DIFF WBC: CPT | Performed by: STUDENT IN AN ORGANIZED HEALTH CARE EDUCATION/TRAINING PROGRAM

## 2024-10-19 PROCEDURE — 82803 BLOOD GASES ANY COMBINATION: CPT

## 2024-10-19 NOTE — ED TRIAGE NOTES
Pt arrived to ED via EMS for alcohol intoxication. As per EMS pt was being combative with her family and is intoxicated with alcohol. Pt became combative in route and got 10 mg if versed I/M en route. On arrival to ED, pt is intoxicated and disoriented.   
You can access the FollowMyHealth Patient Portal offered by [facility] by registering at the following website: [email or web address]. By joining [employer]’s FollowMyHealth portal, you will also be able to view your health information using other applications (apps) compatible with our system.

## 2024-10-19 NOTE — ED PROVIDER NOTES
Encounter Date: 10/19/2024       History     Chief Complaint   Patient presents with    Alcohol Intoxication     Came in via EMS +ETOH  Per EMS patient was being combative with family and aggressive towards them in route. 10 mg IM Versed given pta.     54 y.o. female who has a past medical history of Asthma, Bipolar 1 disorder, History of MRSA infection, Manic depression, and Seizures.  Brought into the emergency department by EMS secondary to agitation in the setting of alcohol use.    Per EMS family activated  paramedics secondary to patient being significantly agitated after drinking alcohol.  After arrival to patient's residence she was combative requiring 10 mg of IM Versed.    Patient currently chemically restrained that is history limited.    Attempted to contact family members with number listed in the chart no answer and unable to leave voicemail    The history is provided by the EMS personnel.     Review of patient's allergies indicates:   Allergen Reactions    Grass pollen-june grass standard Itching     Past Medical History:   Diagnosis Date    Asthma     Bipolar 1 disorder     History of MRSA infection     Manic depression     Seizures      Past Surgical History:   Procedure Laterality Date    APPENDECTOMY      CARPAL TUNNEL RELEASE      COSMETIC SURGERY      breast implants     HERNIA REPAIR      HYSTERECTOMY      INSERTION OF BREAST IMPLANT      KNEE ARTHROPLASTY Right 6/1/2020    Procedure: ARTHROPLASTY, KNEE;  Surgeon: Vasiliy Ivory MD;  Location: [facility];  Service: Orthopedics;  Laterality: Right;     Family History   Problem Relation Name Age of Onset    Schizophrenia Mother      Diabetes Father      Heart disease Father       Social History     Tobacco Use    Smoking status: Every Day     Current packs/day: 0.50     Average packs/day: 0.5 packs/day for 30.0 years (15.0 ttl pk-yrs)     Types: Cigarettes    Smokeless tobacco: Never   Substance Use Topics    Alcohol use: No    Drug use: Yes      Types: Marijuana     Comment: last smoked today, states this is prescribed to me     Review of Systems   Unable to perform ROS: Mental status change       Physical Exam     Initial Vitals   BP Pulse Resp Temp SpO2   10/19/24 0028 10/19/24 0028 10/19/24 0028 10/19/24 0113 10/19/24 0028   136/68 (!) 118 18 97.9 °F (36.6 °C) 97 %      MAP       --                Physical Exam    Nursing note and vitals reviewed.  Constitutional: She appears cachectic. She is sedated.   HENT:   Head: Normocephalic and atraumatic. Mouth/Throat: She does not have dentures.   Edentulous   Eyes: Conjunctivae and EOM are normal. Pupils are equal, round, and reactive to light.   Neck:   Normal range of motion.  Pulmonary/Chest: Breath sounds normal. No respiratory distress.   Abdominal: Abdomen is soft. Bowel sounds are normal. She exhibits no distension. There is no abdominal tenderness.   Musculoskeletal:         General: No tenderness. Normal range of motion.      Cervical back: Normal range of motion.     Neurological: She is alert.   Skin: Skin is warm. Capillary refill takes less than 2 seconds.   Psychiatric: Her behavior is normal.         ED Course   Procedures  Labs Reviewed   CBC W/ AUTO DIFFERENTIAL - Abnormal       Result Value    WBC 9.44      RBC 4.53      Hemoglobin 14.4      Hematocrit 41.0      MCV 91      MCH 31.8 (*)     MCHC 35.1      RDW 11.9      Platelets 224      MPV 9.0 (*)     Immature Granulocytes 0.6 (*)     Gran # (ANC) 5.8      Immature Grans (Abs) 0.06 (*)     Lymph # 2.7      Mono # 0.6      Eos # 0.1      Baso # 0.05      nRBC 0      Gran % 61.9      Lymph % 28.9      Mono % 6.7      Eosinophil % 1.4      Basophil % 0.5      Differential Method Automated     COMPREHENSIVE METABOLIC PANEL - Abnormal    Sodium 142      Potassium 3.3 (*)     Chloride 107      CO2 18 (*)     Glucose 98      BUN 14      Creatinine 0.8      Calcium 9.2      Total Protein 6.9      Albumin 3.9      Total Bilirubin 0.6      Alkaline  Phosphatase 96      AST 27      ALT 18      eGFR >60      Anion Gap 17 (*)    ALCOHOL,MEDICAL (ETHANOL) - Abnormal    Alcohol, Serum 238 (*)    ACETAMINOPHEN LEVEL - Abnormal    Acetaminophen (Tylenol), Serum <3.0 (*)    ISTAT PROCEDURE - Abnormal    POC PH 7.334 (*)     POC PCO2 37.8      POC PO2 69 (*)     POC HCO3 20.1 (*)     POC BE -5 (*)     POC SATURATED O2 93      POC TCO2 21 (*)     Sample VENOUS      Site Other      Allens Test N/A      DelSys Room Air     TSH    TSH 1.806     URINALYSIS, REFLEX TO URINE CULTURE   DRUG SCREEN PANEL, URINE EMERGENCY          Imaging Results    None          Medications - No data to display  Medical Decision Making:   History:   Old Medical Records: I decided to obtain old medical records.  Initial Assessment:   54 y.o. female who has a past medical history of Asthma, Bipolar 1 disorder, History of MRSA infection, Manic depression, and Seizures.  Brought into the emergency department by EMS secondary to agitation in the setting of alcohol use.  Patient without any signs of head trauma moving all extremities spontaneously.  Not getting to auscultation bilaterally.  Vital signs within normal limits.  Will obtain labs to assess for any acute electrolyte derangements.  Suspect patient's agitation secondary to alcohol intoxication will observe patient in the emergency department until she was clinically sober  Differential Diagnosis:   Differential Diagnosis includes, but is not limited to:  CVA/TIA, seizure, status epilepticus, post-ictal state, meningitis/encephalitis, sepsis, MI/ACS, arrhythmia, syncope, intracranial mass/hemorrhage, head trauma, anaphylaxis, substance abuse, alcohol intoxication/withdrawal, medication reaction, intentional medication overdose, neuroleptic malignant syndrome, serotonin syndrome, CO poisoning, hypoxia/hypercapnea, hepatic encephalopathy, metabolic disturbance, thyroid disease, hypoglycemia.   Clinical Tests:   Lab Tests: Ordered and  Reviewed  ED Management:  Patient presents with altered mental status likely secondary to EtOH intoxication. Patient maintained his airway, and metabolized to sobriety and no longer altered. Patient with no head trauma to suggest intracranial hemorrhage, no overt signs of opioid intoxication or coingestion. No infectious symptoms and afebrile so doubt sepsis. Patient with no signs of any medical emergencies at this time.               ED Course as of 10/19/24 0548   Sat Oct 19, 2024   0045 CBC auto differential(!)  CBC reviewed and interpreted by me:   No significant leukocytosis, anemia (at baseline), or platelet abnormalities.   [AS]   0115 Comprehensive metabolic panel(!)  Chem 14 reviewed and interpreted by me:  - No significant hypo-or hyper natremia,  kalemia , chloridemia, or other electrolyte abnormalities  - BUN and creatinine (at or around baseline),   - ALT and AST were within normal limits indicating normal liver function.   [AS]   0131 Ethanol(!)  Likely patient's cause of mental status change and agitation [AS]   0538 Patient more alert and awake and requesting to walk and urinate. [AS]   0540 Patient ambulated to the bathroom without any assistance. [AS]   0544 Pt is currently stable for discharge. I see no indication of an emergent process beyond that addressed during our encounter but have duly counseled the patient/family regarding the need for prompt follow-up as well as the indications that should prompt immediate return to the emergency room should new or worrisome developments occur. I discussed the ED work up and diagnostic findings with the patient/family. The patient/family has been provided with verbal and printed direction regarding our final diagnosis(es) as well as instructions regarding use of OTC and/or Rx medications intended to manage the patient's aforementioned conditions. The patient/family verbalized an understanding. The patient/family is asked if there are any questions or  concerns. We discuss the case, until all issues are addressed to the patient/family's satisfaction. Patient/family understands and is agreeable to the plan.  [AS]      ED Course User Index  [AS] Óscar Macdonald MD          Medical Decision Making         Clinical Impression:   Final diagnoses:  [F10.929] Alcoholic intoxication with complication (Primary)          ED Disposition Condition    Discharge Stable          ED Prescriptions    None       Follow-up Information       Follow up With Specialties Details Why Contact Info    [facility] -  Schedule an appointment as soon as possible for a visit  for reassesment [address]  [facility] [address]  [phone]      [facility] Emergency Dept Emergency Medicine  If symptoms worsen [address] [facility] [place] [address] [phone]            DISCLAIMER: This note was prepared with [employer] voice recognition transcription software. Garbled syntax, mangled pronouns, and other bizarre constructions may be attributed to that software system.     Óscar Macdonald MD  10/19/24 8100

## 2024-10-19 NOTE — RESPIRATORY THERAPY
Latest Reference Range & Units 10/19/24 00:45   POC PH 7.35 - 7.45  7.334 (L)   POC PCO2 35 - 45 mmHg 37.8   POC PO2 40 - 60 mmHg 69 (HH)   POC HCO3 24 - 28 mmol/L 20.1 (L)   POC SATURATED O2 95 - 100 % 93   Sample  VENOUS   POC TCO2 24 - 29 mmol/L 21 (L)   POC BE -2 to 2 mmol/L -5 (L)   [facility]  Room Air   Site  Other   (HH): Data is critically high  (L): Data is abnormally low

## 2024-10-19 NOTE — DISCHARGE INSTRUCTIONS
Thank you for coming to our Emergency Department today. It is important to remember that some problems are difficult to diagnose and may not be found during your first visit. Be sure to follow up with your primary care doctor and review any labs/imaging that was performed with them. If you do not have a primary care doctor, you may contact the one listed on your discharge paperwork or you may also call the [facility] Appointment Desk at [phone] to schedule an appointment with one.     All medications may potentially have side effects and it is impossible to predict which medications may give you side effects. If you feel that you are having a negative effect of any medication you should immediately stop taking them and seek medical attention.    Return to the ER with any questions/concerns, new/concerning symptoms, worsening or failure to improve. Do not drive or make any important decisions for 24 hours if you have received any pain medications, sedatives or mood altering drugs during your ER visit.

## 2024-10-24 NOTE — PROGRESS NOTES
Phoned patient on 2 separate occasions for ED Navigation. Patient was unavailable. Encounter closed.  Abebe Loja

## 2025-02-12 ENCOUNTER — HOSPITAL ENCOUNTER (EMERGENCY)
Facility: HOSPITAL | Age: 55
Discharge: LEFT BEFORE DISCHARGE | End: 2025-02-12
Attending: EMERGENCY MEDICINE
Payer: MEDICAID

## 2025-02-12 VITALS
OXYGEN SATURATION: 98 % | SYSTOLIC BLOOD PRESSURE: 121 MMHG | HEIGHT: 61 IN | TEMPERATURE: 98 F | BODY MASS INDEX: 24.55 KG/M2 | RESPIRATION RATE: 18 BRPM | DIASTOLIC BLOOD PRESSURE: 60 MMHG | HEART RATE: 107 BPM | WEIGHT: 130 LBS

## 2025-02-12 DIAGNOSIS — Z53.21 ELOPED FROM EMERGENCY DEPARTMENT: Primary | ICD-10-CM

## 2025-02-12 PROCEDURE — 99281 EMR DPT VST MAYX REQ PHY/QHP: CPT

## 2025-02-12 NOTE — ED PROVIDER NOTES
Encounter Date: 2/12/2025       History     Chief Complaint   Patient presents with    Otalgia     Left ear pain x 4 years. Pt has chronic cyst in ear and has been on bactrim x 4 days     I did not obtain a history from this patient.  She eloped from the emergency department prior to my evaluation.    Per chart review, patient has had a chronic cellulitis/abscess/cyst in her left ear.  She is currently on Bactrim after being seen in the emergency department for similar symptoms 3 days ago.    The history is provided by medical records.     Review of patient's allergies indicates:   Allergen Reactions    Grass pollen-june grass standard Itching     Past Medical History:   Diagnosis Date    Asthma     Bipolar 1 disorder     History of MRSA infection     Manic depression     Seizures      Past Surgical History:   Procedure Laterality Date    APPENDECTOMY      CARPAL TUNNEL RELEASE      COSMETIC SURGERY      breast implants     HERNIA REPAIR      HYSTERECTOMY      INSERTION OF BREAST IMPLANT      KNEE ARTHROPLASTY Right 6/1/2020    Procedure: ARTHROPLASTY, KNEE;  Surgeon: Vasiliy Ivory MD;  Location: [facility];  Service: Orthopedics;  Laterality: Right;     Family History   Problem Relation Name Age of Onset    Schizophrenia Mother      Diabetes Father      Heart disease Father       Social History     Tobacco Use    Smoking status: Every Day     Current packs/day: 0.50     Average packs/day: 0.5 packs/day for 30.0 years (15.0 ttl pk-yrs)     Types: Cigarettes    Smokeless tobacco: Never   Substance Use Topics    Alcohol use: No    Drug use: Yes     Types: Marijuana     Comment: last smoked today, states this is prescribed to me     Review of Systems   Reason unable to perform ROS: Patient eloped from emergency department.       Physical Exam     Initial Vitals [02/12/25 1318]   BP Pulse Resp Temp SpO2   121/60 107 18 97.5 °F (36.4 °C) 98 %      MAP       --         Physical Exam    Nursing note and vitals  reviewed.          ED Course   Procedures  Labs Reviewed - No data to display       Imaging Results    None          Medications - No data to display  Medical Decision Making  I did not evaluate this patient.  Patient eloped from the emergency department prior to my evaluation.                                      Clinical Impression:  Final diagnoses:  [Z53.21] Eloped from emergency department (Primary)          ED Disposition Condition    Eloped Stable                Khadar Méndez, PALILY  02/12/25 1538

## 2025-02-13 ENCOUNTER — HOSPITAL ENCOUNTER (EMERGENCY)
Facility: HOSPITAL | Age: 55
Discharge: LEFT BEFORE DISCHARGE | End: 2025-02-13
Attending: EMERGENCY MEDICINE
Payer: MEDICAID

## 2025-02-13 VITALS
DIASTOLIC BLOOD PRESSURE: 82 MMHG | WEIGHT: 128 LBS | SYSTOLIC BLOOD PRESSURE: 164 MMHG | RESPIRATION RATE: 17 BRPM | HEIGHT: 61 IN | BODY MASS INDEX: 24.17 KG/M2 | HEART RATE: 102 BPM | OXYGEN SATURATION: 99 % | TEMPERATURE: 99 F

## 2025-02-13 DIAGNOSIS — Z76.89 ENCOUNTER FOR MEDICAL CARE: Primary | ICD-10-CM

## 2025-02-13 PROCEDURE — 99281 EMR DPT VST MAYX REQ PHY/QHP: CPT

## 2025-02-13 NOTE — ED PROVIDER NOTES
"Patient was "roomed" in Hallway Bed 2- I went to evaluate the patient multiple times and she is not here. She will be marked as an elopement. I did not physically see or evaluate this patient.     Jaymie Aldana MD   2:44 PM       Jaymie Aldana MD  02/13/25 4122    "

## 2025-03-11 ENCOUNTER — HOSPITAL ENCOUNTER (EMERGENCY)
Facility: HOSPITAL | Age: 55
Discharge: HOME | End: 2025-03-11
Attending: EMERGENCY MEDICINE
Payer: MEDICAID

## 2025-03-11 VITALS
DIASTOLIC BLOOD PRESSURE: 79 MMHG | BODY MASS INDEX: 25.13 KG/M2 | OXYGEN SATURATION: 99 % | HEIGHT: 60 IN | SYSTOLIC BLOOD PRESSURE: 159 MMHG | WEIGHT: 128 LBS | TEMPERATURE: 98 F | RESPIRATION RATE: 20 BRPM | HEART RATE: 82 BPM

## 2025-03-11 DIAGNOSIS — H93.8X2 EAR SWELLING, LEFT: Primary | ICD-10-CM

## 2025-03-11 LAB
ALBUMIN SERPL BCP-MCNC: 3.5 G/DL (ref 3.5–5.2)
ALP SERPL-CCNC: 96 U/L (ref 40–150)
ALT SERPL W/O P-5'-P-CCNC: 14 U/L (ref 10–44)
ANION GAP SERPL CALC-SCNC: 12 MMOL/L (ref 8–16)
AST SERPL-CCNC: 29 U/L (ref 10–40)
BASOPHILS # BLD AUTO: 0.04 K/UL (ref 0–0.2)
BASOPHILS NFR BLD: 0.5 % (ref 0–1.9)
BILIRUB SERPL-MCNC: 0.2 MG/DL (ref 0.1–1)
BUN SERPL-MCNC: 11 MG/DL (ref 6–20)
CALCIUM SERPL-MCNC: 8.9 MG/DL (ref 8.7–10.5)
CHLORIDE SERPL-SCNC: 106 MMOL/L (ref 95–110)
CO2 SERPL-SCNC: 24 MMOL/L (ref 23–29)
CREAT SERPL-MCNC: 0.7 MG/DL (ref 0.5–1.4)
CRP SERPL-MCNC: 0.7 MG/L (ref 0–8.2)
DIFFERENTIAL METHOD BLD: ABNORMAL
EOSINOPHIL # BLD AUTO: 0.2 K/UL (ref 0–0.5)
EOSINOPHIL NFR BLD: 2.3 % (ref 0–8)
ERYTHROCYTE [DISTWIDTH] IN BLOOD BY AUTOMATED COUNT: 11.9 % (ref 11.5–14.5)
EST. GFR  (NO RACE VARIABLE): >60 ML/MIN/1.73 M^2
GLUCOSE SERPL-MCNC: 75 MG/DL (ref 70–110)
HCT VFR BLD AUTO: 39.2 % (ref 37–48.5)
HGB BLD-MCNC: 14.1 G/DL (ref 12–16)
IMM GRANULOCYTES # BLD AUTO: 0.04 K/UL (ref 0–0.04)
IMM GRANULOCYTES NFR BLD AUTO: 0.5 % (ref 0–0.5)
LYMPHOCYTES # BLD AUTO: 2.3 K/UL (ref 1–4.8)
LYMPHOCYTES NFR BLD: 31.3 % (ref 18–48)
MCH RBC QN AUTO: 32.9 PG (ref 27–31)
MCHC RBC AUTO-ENTMCNC: 36 G/DL (ref 32–36)
MCV RBC AUTO: 91 FL (ref 82–98)
MONOCYTES # BLD AUTO: 0.5 K/UL (ref 0.3–1)
MONOCYTES NFR BLD: 6.1 % (ref 4–15)
NEUTROPHILS # BLD AUTO: 4.4 K/UL (ref 1.8–7.7)
NEUTROPHILS NFR BLD: 59.3 % (ref 38–73)
NRBC BLD-RTO: 0 /100 WBC
PLATELET # BLD AUTO: 250 K/UL (ref 150–450)
PMV BLD AUTO: 9.1 FL (ref 9.2–12.9)
POTASSIUM SERPL-SCNC: 4.4 MMOL/L (ref 3.5–5.1)
PROT SERPL-MCNC: 6.8 G/DL (ref 6–8.4)
RBC # BLD AUTO: 4.29 M/UL (ref 4–5.4)
SODIUM SERPL-SCNC: 142 MMOL/L (ref 136–145)
WBC # BLD AUTO: 7.48 K/UL (ref 3.9–12.7)

## 2025-03-11 PROCEDURE — 63600175 PHARM REV CODE 636 W HCPCS: Mod: JZ,TB | Performed by: NURSE PRACTITIONER

## 2025-03-11 PROCEDURE — 85025 COMPLETE CBC W/AUTO DIFF WBC: CPT | Performed by: STUDENT IN AN ORGANIZED HEALTH CARE EDUCATION/TRAINING PROGRAM

## 2025-03-11 PROCEDURE — 96374 THER/PROPH/DIAG INJ IV PUSH: CPT

## 2025-03-11 PROCEDURE — 80053 COMPREHEN METABOLIC PANEL: CPT | Performed by: STUDENT IN AN ORGANIZED HEALTH CARE EDUCATION/TRAINING PROGRAM

## 2025-03-11 PROCEDURE — 86140 C-REACTIVE PROTEIN: CPT | Performed by: STUDENT IN AN ORGANIZED HEALTH CARE EDUCATION/TRAINING PROGRAM

## 2025-03-11 PROCEDURE — 25500020 PHARM REV CODE 255: Performed by: EMERGENCY MEDICINE

## 2025-03-11 PROCEDURE — 99285 EMERGENCY DEPT VISIT HI MDM: CPT | Mod: 25

## 2025-03-11 PROCEDURE — 25000003 PHARM REV CODE 250: Performed by: NURSE PRACTITIONER

## 2025-03-11 RX ORDER — TRAMADOL HYDROCHLORIDE 50 MG/1
50 TABLET ORAL
Status: COMPLETED | OUTPATIENT
Start: 2025-03-11 | End: 2025-03-11

## 2025-03-11 RX ORDER — KETOROLAC TROMETHAMINE 30 MG/ML
10 INJECTION, SOLUTION INTRAMUSCULAR; INTRAVENOUS
Status: COMPLETED | OUTPATIENT
Start: 2025-03-11 | End: 2025-03-11

## 2025-03-11 RX ORDER — TRAMADOL HYDROCHLORIDE 50 MG/1
50 TABLET ORAL EVERY 6 HOURS PRN
Qty: 12 TABLET | Refills: 0 | Status: SHIPPED | OUTPATIENT
Start: 2025-03-11 | End: 2025-03-14

## 2025-03-11 RX ADMIN — TRAMADOL HYDROCHLORIDE 50 MG: 50 TABLET, COATED ORAL at 09:03

## 2025-03-11 RX ADMIN — IOHEXOL 75 ML: 350 INJECTION, SOLUTION INTRAVENOUS at 07:03

## 2025-03-11 RX ADMIN — KETOROLAC TROMETHAMINE 10 MG: 30 INJECTION, SOLUTION INTRAMUSCULAR; INTRAVENOUS at 08:03

## 2025-03-11 NOTE — ED NOTES
Patient states left ear pain x 4 years, last surgery in December, states stopped antibiotics 2 weeks ago, reports foul smelling  draininge

## 2025-03-11 NOTE — FIRST PROVIDER EVALUATION
Medical screening examination initiated.  I have conducted a focused provider triage encounter, findings are as follows:    Brief history of present illness:      Left ear swelling worsening for quite some time  She has recently been on abx  Multiple ear surgeries in the past per patient    Vitals:    03/11/25 1604   BP: (!) 177/97   Pulse: 100   Resp: 20   Temp: 98.1 °F (36.7 °C)   TempSrc: Oral   SpO2: 98%   Weight: 58.1 kg (128 lb)   Height: 5' (1.524 m)       Pertinent physical exam:      A&OX3  Swelling to the left superior helix    Brief workup plan:      Labs    Preliminary workup initiated; this workup will be continued and followed by the physician or advanced practice provider that is assigned to the patient when roomed.

## 2025-03-11 NOTE — ED NOTES
Patient identifiers verified and correct for  MS Menchaca  C/C:  Left ear pain SEE NN  APPEARANCE: awake and alert in NAD. PAIN  10/10  SKIN: warm, dry and intact. No breakdown or bruising.  MUSCULOSKELETAL: Patient moving all extremities spontaneously, no obvious swelling or deformities noted. Ambulates independently.  RESPIRATORY: Denies shortness of breath.Respirations unlabored.   CARDIAC: Denies CP, 2+ distal pulses; no peripheral edema  ABDOMEN: S/ND/NT, Denies nausea  : voids spontaneously, denies difficulty  Neurologic: AAO x 4; follows commands equal strength in all extremities; denies numbness/tingling. Denies dizziness  Denies new weakness

## 2025-03-11 NOTE — ED PROVIDER NOTES
Encounter Date: 3/11/2025       History     Chief Complaint   Patient presents with    Ear Problem     Pt reports she has been seen multiple times for perichondritis to the last left ear. Pt states last surgery was in January. Pt states the ringing in her ears and pain is getting worse.      55 y/o female which presents to the ED with continued left ear pain that has occurred for almost year. Pt states she was been seen several times and treated for left ear cellulitis. Pt states April of 2024 she fell and hit her ear on something and then developed a cyst. The cyst was removed by ENT, Dr. Jean Galarza. She then developed cellulitis and has had problems since then.     The history is provided by the patient.     Review of patient's allergies indicates:   Allergen Reactions    Grass pollen-june grass standard Itching     Past Medical History:   Diagnosis Date    Asthma     Bipolar 1 disorder     History of MRSA infection     Manic depression     Seizures      Past Surgical History:   Procedure Laterality Date    APPENDECTOMY      CARPAL TUNNEL RELEASE      COSMETIC SURGERY      breast implants     HERNIA REPAIR      HYSTERECTOMY      INSERTION OF BREAST IMPLANT      KNEE ARTHROPLASTY Right 6/1/2020    Procedure: ARTHROPLASTY, KNEE;  Surgeon: Vasiliy Ivory MD;  Location: [facility];  Service: Orthopedics;  Laterality: Right;     Family History   Problem Relation Name Age of Onset    Schizophrenia Mother      Diabetes Father      Heart disease Father       Social History[1]  Review of Systems   Constitutional:  Negative for fever.   HENT:  Positive for ear pain (left). Negative for sore throat.    Respiratory:  Negative for shortness of breath.    Cardiovascular:  Negative for chest pain.   Gastrointestinal:  Negative for nausea.   Genitourinary:  Negative for dysuria.   Musculoskeletal:  Negative for back pain.   Skin:  Negative for rash.   Neurological:  Negative for weakness.   Hematological:  Does not  bruise/bleed easily.   All other systems reviewed and are negative.    Physical Exam     Initial Vitals [03/11/25 1604]   BP Pulse Resp Temp SpO2   (!) 177/97 100 20 98.1 °F (36.7 °C) 98 %      MAP       --         Physical Exam    Nursing note and vitals reviewed.  Constitutional: She appears well-developed and well-nourished.   HENT:   Head: Normocephalic and atraumatic.   See picture of left external ear- unable to assess ear canal due to swelling; left mastoid tenderness with post auricular lymphadenopathy   Eyes: Conjunctivae and EOM are normal. Pupils are equal, round, and reactive to light.   Neck:   Normal range of motion.  Cardiovascular:  Normal rate, regular rhythm, normal heart sounds and intact distal pulses.     Exam reveals no gallop and no friction rub.       No murmur heard.  Pulmonary/Chest: Breath sounds normal. No respiratory distress. She has no wheezes. She has no rhonchi. She has no rales. She exhibits no tenderness.   Musculoskeletal:         General: No tenderness or edema. Normal range of motion.      Cervical back: Normal range of motion.     Neurological: She is alert and oriented to person, place, and time. She has normal strength. GCS score is 15. GCS eye subscore is 4. GCS verbal subscore is 5. GCS motor subscore is 6.   Skin: Skin is warm. Capillary refill takes less than 2 seconds. No rash noted. No erythema.   Psychiatric: She has a normal mood and affect.         ED Course   Procedures  Labs Reviewed   CBC W/ AUTO DIFFERENTIAL - Abnormal       Result Value    WBC 7.48      RBC 4.29      Hemoglobin 14.1      Hematocrit 39.2      MCV 91      MCH 32.9 (*)     MCHC 36.0      RDW 11.9      Platelets 250      MPV 9.1 (*)     Immature Granulocytes 0.5      Gran # (ANC) 4.4      Immature Grans (Abs) 0.04      Lymph # 2.3      Mono # 0.5      Eos # 0.2      Baso # 0.04      nRBC 0      Gran % 59.3      Lymph % 31.3      Mono % 6.1      Eosinophil % 2.3      Basophil % 0.5      Differential  Method Automated     COMPREHENSIVE METABOLIC PANEL    Sodium 142      Potassium 4.4      Chloride 106      CO2 24      Glucose 75      BUN 11      Creatinine 0.7      Calcium 8.9      Total Protein 6.8      Albumin 3.5      Total Bilirubin 0.2      Alkaline Phosphatase 96      AST 29      ALT 14      eGFR >60.0      Anion Gap 12     C-REACTIVE PROTEIN    CRP 0.7            Imaging Results              CT Temporal Bone with Contrast (Final result)  Result time 03/11/25 21:15:37      Final result by Rj Robertson MD (03/11/25 21:15:37)                   Impression:      Stable thickening and hyperenhancement of the left external ear.  Correlate for previous trauma and chondromalacia versus inflammatory infectious or neoplastic process unchanged significantly since the prior exam.    No drainable fluid collections or findings of otomastoiditis.    Electronically signed by resident: Melvin Lanza  Date:    03/11/2025  Time:    20:10    Electronically signed by: Rj Robertson  Date:    03/11/2025  Time:    21:15               Narrative:    EXAMINATION:  CT TEMPORAL BONE WITH CONTRAST    CLINICAL HISTORY:  Mastoiditis;    TECHNIQUE:  Axial images were acquired through the temporal bones and skull base following the administration of 75 mL of intravenous Omnipaque contrast..  Multiplanar reconstructions were performed.    COMPARISON:  CT temporal bone 07/03/2024.    FINDINGS:  Right:    External ear: Trace debris in the external auditory canal.    Middle ear: Well aerated. [place] unremarkable.    Petrous temporal bone/mastoid air cells: Clear.    Inner ear: Labyrinthine structures maintain normal morphology.  Vestibular aqueduct is not enlarged.    IAC/CPA: Unremarkable.    Other: N/A.    Left:    External ear: Thickening and hyperenhancement of the left external ear, similar to prior.  No drainable fluid collections..    Middle ear: Well aerated. [place] unremarkable.    Petrous temporal bone/mastoid air  cells: Trace mastoid fluid, similar prior.    Inner ear: Labyrinthine structures maintain normal morphology.  Vestibular aqueduct is not enlarged.    IAC/CPA: Unremarkable.    Other: N/A.    Intracranial Compartment (limited evaluation): Unremarkable.    Skull/Extracranial Contents (limited evaluation): Unremarkable.                                       Medications   iohexoL (OMNIPAQUE 350) injection 75 mL (75 mLs Intravenous Given 3/11/25 1933)   ketorolac injection 9.999 mg (9.999 mg Intravenous Given 3/11/25 2047)   traMADoL tablet 50 mg (50 mg Oral Given 3/11/25 2135)     Medical Decision Making  55 y/o female which presents to the ED with continued left ear pain and swelling. She states that sometimes it drains a foul smelling liquid. She constantly has pain to the area. She has been treated several times for cellulitis. Ct does not show evidence of cellulitis. Pt has been advised to follow up with an ENT for biopsy of the area. She was discharged with pain medication and a referral has been placed for ENT.     Differential Diagnosis: cauliflower ear, cellulitis, abscess, mastoiditis    Problems Addressed:  Ear swelling, left: acute illness or injury    Amount and/or Complexity of Data Reviewed  Radiology: ordered.    Risk  Prescription drug management.               ED Course as of 03/12/25 2010   Tue Mar 11, 2025   1840 BP: 133/79 [AT]   1840 MAP (mmHg): 97 [AT]   1840 Temp: 98.6 °F (37 °C) [AT]   1840 Temp Source: Oral [AT]   1840 Pulse: 86 [AT]   1840 SpO2: 96 % [AT]      ED Course User Index  [AT] Angela Santacruz FNP                           Clinical Impression:  Final diagnoses:  [H93.8X2] Ear swelling, left (Primary)          ED Disposition Condition    Discharge Stable          ED Prescriptions       Medication Sig Dispense Start Date End Date Auth. Provider    traMADoL (ULTRAM) 50 mg tablet Take 1 tablet (50 mg total) by mouth every 6 (six) hours as needed for Pain. 12 tablet 3/11/2025 3/14/2025  Angela Santacruz FNP          Follow-up Information       Follow up With Specialties Details Why Contact Info    Jean Galarza MD Otolaryngology Schedule an appointment as soon as possible for a visit   [address] [place] [address] [address]  [place] [address]  [phone]                   [1]   Social History  Tobacco Use    Smoking status: Every Day     Current packs/day: 0.50     Average packs/day: 0.5 packs/day for 30.0 years (15.0 ttl pk-yrs)     Types: Cigarettes    Smokeless tobacco: Never   Substance Use Topics    Alcohol use: No    Drug use: Yes     Types: Marijuana     Comment: last smoked today, states this is prescribed to me        Angela Santacruz FNP  03/12/25 2010

## 2025-03-12 NOTE — DISCHARGE INSTRUCTIONS
Your CT is concerning for inflammation vs skin cancer. Please see Dr. Galarza for further evaluation and possible biopsy of your ear.

## 2025-03-25 ENCOUNTER — TELEPHONE (OUTPATIENT)
Dept: OTOLARYNGOLOGY | Facility: CLINIC | Age: 55
End: 2025-03-25
Payer: MEDICAID

## 2025-03-25 ENCOUNTER — HOSPITAL ENCOUNTER (EMERGENCY)
Facility: HOSPITAL | Age: 55
Discharge: HOME | End: 2025-03-25
Attending: EMERGENCY MEDICINE
Payer: MEDICAID

## 2025-03-25 VITALS
HEART RATE: 97 BPM | HEIGHT: 60 IN | WEIGHT: 128 LBS | OXYGEN SATURATION: 98 % | SYSTOLIC BLOOD PRESSURE: 129 MMHG | RESPIRATION RATE: 18 BRPM | BODY MASS INDEX: 25.13 KG/M2 | DIASTOLIC BLOOD PRESSURE: 72 MMHG | TEMPERATURE: 98 F

## 2025-03-25 DIAGNOSIS — H61.032 CHONDRITIS OF AURICLE, LEFT: Primary | ICD-10-CM

## 2025-03-25 PROCEDURE — 99284 EMERGENCY DEPT VISIT MOD MDM: CPT | Mod: 25

## 2025-03-25 PROCEDURE — 96372 THER/PROPH/DIAG INJ SC/IM: CPT | Performed by: EMERGENCY MEDICINE

## 2025-03-25 PROCEDURE — 63600175 PHARM REV CODE 636 W HCPCS: Mod: JZ,TB | Performed by: EMERGENCY MEDICINE

## 2025-03-25 PROCEDURE — 99205 OFFICE O/P NEW HI 60 MIN: CPT | Mod: ,,, | Performed by: OTOLARYNGOLOGY

## 2025-03-25 RX ORDER — NAPROXEN 500 MG/1
500 TABLET ORAL 2 TIMES DAILY WITH MEALS
Qty: 20 TABLET | Refills: 0 | Status: SHIPPED | OUTPATIENT
Start: 2025-03-25

## 2025-03-25 RX ORDER — HYDROCODONE BITARTRATE AND ACETAMINOPHEN 5; 325 MG/1; MG/1
1 TABLET ORAL EVERY 4 HOURS PRN
Qty: 18 TABLET | Refills: 0 | Status: SHIPPED | OUTPATIENT
Start: 2025-03-25

## 2025-03-25 RX ORDER — KETOROLAC TROMETHAMINE 30 MG/ML
15 INJECTION, SOLUTION INTRAMUSCULAR; INTRAVENOUS
Status: COMPLETED | OUTPATIENT
Start: 2025-03-25 | End: 2025-03-25

## 2025-03-25 RX ORDER — AMOXICILLIN AND CLAVULANATE POTASSIUM 875; 125 MG/1; MG/1
1 TABLET, FILM COATED ORAL 2 TIMES DAILY
Qty: 14 TABLET | Refills: 0 | Status: SHIPPED | OUTPATIENT
Start: 2025-03-25

## 2025-03-25 RX ADMIN — KETOROLAC TROMETHAMINE 15 MG: 30 INJECTION, SOLUTION INTRAMUSCULAR; INTRAVENOUS at 01:03

## 2025-03-25 NOTE — ED PROVIDER NOTES
Encounter Date: 3/25/2025       History     Chief Complaint   Patient presents with    Otalgia     Pt c/o pain to left ear with swelling x3 years. Pt stated her ear has gradually been getting bigger over time. Pt denied drainage.     HPI  Review of patient's allergies indicates:   Allergen Reactions    Grass pollen-june grass standard Itching     Past Medical History:   Diagnosis Date    Asthma     Bipolar 1 disorder     History of MRSA infection     Manic depression     Seizures      Past Surgical History:   Procedure Laterality Date    APPENDECTOMY      CARPAL TUNNEL RELEASE      COSMETIC SURGERY      breast implants     HERNIA REPAIR      HYSTERECTOMY      INSERTION OF BREAST IMPLANT      KNEE ARTHROPLASTY Right 6/1/2020    Procedure: ARTHROPLASTY, KNEE;  Surgeon: Vasiliy Ivory MD;  Location: [facility];  Service: Orthopedics;  Laterality: Right;     Family History   Problem Relation Name Age of Onset    Schizophrenia Mother      Diabetes Father      Heart disease Father       Social History[1]  Review of Systems    Physical Exam     Initial Vitals [03/25/25 1245]   BP Pulse Resp Temp SpO2   129/72 97 18 97.7 °F (36.5 °C) 98 %      MAP       --         Physical Exam          ED Course   Procedures  Labs Reviewed - No data to display       Imaging Results    None          Medications   ketorolac injection 15 mg (15 mg Intramuscular Given 3/25/25 1339)     Medical Decision Making  Findings most consistent with a polychondritis.  Possible infection possible neoplasia.  And go ahead and initiate Augmentin therapy.  Also p.o. pain control.  Patient received a shot of Toradol here with some symptomatic improvement.    Risk  Prescription drug management.               ED Course as of 03/25/25 1413   Tue Mar 25, 2025   1410 Patient is seen by Dr. Weir felt the patient would benefit from oral antibiotic therapy and oral pain control.  She will see the patient in her office in the next 2-3 days.  Please see her  documentation. 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 Provider    amoxicillin-clavulanate 875-125mg (AUGMENTIN) 875-125 mg per tablet Take 1 tablet by mouth 2 (two) times daily. 14 tablet 3/25/2025 -- Mitch Briggs MD    naproxen (NAPROSYN) 500 MG tablet Take 1 tablet (500 mg total) by mouth 2 (two) times daily with meals. 20 tablet 3/25/2025 -- Mitch Briggs MD    HYDROcodone-acetaminophen (NORCO) 5-325 mg per tablet Take 1 tablet by mouth every 4 (four) hours as needed for Pain. 18 tablet 3/25/2025 -- Mitch Briggs MD          Follow-up Information       Follow up With Specialties Details Why Contact Info    Zahida Weir MD Otolaryngology Go to  Appointment scheduled [address] [place] [id number] [phone]                   [1]   Social History  Tobacco Use    Smoking status: Every Day     Current packs/day: 0.50     Average packs/day: 0.5 packs/day for 30.0 years (15.0 ttl pk-yrs)     Types: Cigarettes    Smokeless tobacco: Never   Substance Use Topics    Alcohol use: No    Drug use: Yes     Types: Marijuana     Comment: last smoked today, states this is prescribed to me        Mitch Briggs MD  03/25/25 9304

## 2025-03-25 NOTE — CONSULTS
[facility] - Emergency Dept  Otorhinolaryngology-Head & Neck Surgery  Consult Note    Patient Name: Alina Menchaca  MRN: [id number]  Code Status: Prior  Admission Date: 3/25/2025  Hospital Length of Stay: 0 days  Attending Physician: Faiza Gale MD  [facility]    Consults  Images from 2023,2024 and march 2025 reviewed- area of helix cut off slightly on images from this year but clinically no findings concerning for abscess in this area. Imaging did get significantly worse from April of 2024 to July of 2024 however from  march 2025 compared to July of 2024 soft tissue thickening is worse but not as drastic of a change compared to changes that occurred on July 2024 images from images in 2023 and April of 2024  Area of swelling appears more like cauliflower ear however there is area of possible cyst vs nodule in the antihelix adjacent to helical rim. Discussed with patient that ddx of relapsing polychondritis, skin cancer, cauliflower ear, cellulitis. Has not had outpatient antibiotics and reports response to antibiotics in the past. As this is first time I have seen patient and I am not able to get records from dr lau to download in epic today ( will have her fill out medical release form when she follows up with me) although I suspect swelling is cauliflower ear vs relapsing polychondritis, I suspect the nodule area needs to be biopsied to rule out skin cancer although it is possible this is a recurrent sebaceous cyst- it sounds like this may have been what happened and perhaps developed cauliflower ear with subsequent I+D? Unclear from patient history ; clinically less suspicious for malignancy given appearance overall however history concerning and thus I did stress to the patient it was extremely important for her to follow up with me for biopsy in clinic and evaluation. Given unusual appearance and possibility of cauliflower ear I would like to  make sure that area chosen for biopsy is optimal area  as patient stated that area that looks like cauliflower ear goes away almost entirely which would not happen in that scenario but can happen in setting of cancer . 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   Subjective:     Chief Complaint/Reason for Admission: ear pain and swelling    History of Present Illness: 54 year old female presenting to ER for above. She was seen in er 3-11-25 at [facility] and advised to follow  up with her ent dr lau however dr lau has moved to [place].     She reports persistent swelling and erythema of ear and associated pain. She states that she had a cyst of the ear in the antihelix region that was removed by dr lau. She reports that it then came back and she had to have her ear drained on 5 separate dates subsequently. She states she never had a bolster placed on the ear after a procedure that she can recall. She states that the swelling of her ear will go down when she takes antibiotics but relatively quickly after she discontinues the antibiotics the swelling recurs. She denies change in hearing. No drainage from ear. She was having some drainage but that stopped. She also has pain radiating around the jaw joint. Denies facial numbness    Patient has presented to [facility] emergency room in April and July of 2024 for similar complaints; she had been seen in June and November of 2023 at [facility] er and per notes it  sounds like may have had folliculitis vs infected sebaceous cyst- per er note 4-6-24 patient had reported she had undergone surgery with dr lau in December of 2023  Photo from er note on 11-18-23 area of swelling appears to be possible first branchial cleft cyst but photo from 7-2-24 appears like possible cauliflower ear     11-18-23 photo    7-2-24 photo    3-11-25 photo    Medications:  Continuous Infusions:  Scheduled Meds:  PRN Meds:     No current facility-administered medications on file prior to encounter.     Current Outpatient Medications on File Prior to Encounter   Medication Sig    albuterol (PROVENTIL) 2.5 mg /3 mL (0.083 %) nebulizer solution Take 3 mLs (2.5 mg total) by nebulization every 6 (six) hours as needed for Wheezing.    aspirin 81 MG Chew Take 1 tablet (81 mg total) by mouth once daily.    azithromycin (Z-CY) 250 MG tablet Take 2 tablets (500 mg total) by mouth once daily. Take first 2 tablets together, then 1 every day until finished.    cetirizine (ZYRTEC) 10 MG tablet Take 1 tablet (10 mg total) by mouth once daily. for 12 days    divalproex (DEPAKOTE) 250 MG EC tablet Take 1 tablet (250 mg total) by mouth every 8 (eight) hours.    FLUoxetine 20 MG capsule Take 40 mg by mouth once daily.    fluticasone-vilanterol (BREO) 100-25 mcg/dose diskus inhaler Inhale 1 puff into the lungs once daily. Controller    HYDROcodone-acetaminophen (NORCO) 5-325 mg per tablet Take 1 tablet by mouth every 6 (six) hours as needed for Pain.    ibuprofen (ADVIL,MOTRIN) 800 MG tablet Take 1 tablet (800 mg total) by mouth every 6 (six) hours as needed for Pain.    ipratropium-albuteroL (COMBIVENT)  mcg/actuation inhaler Inhale 1 puff into the lungs every 6 (six) hours as needed for Wheezing. Rescue    ketorolac (TORADOL) 10 mg tablet Take 1 tablet (10 mg total) by mouth every 6 (six) hours as needed for Pain. Take with food.    lidocaine (LIDODERM) 5 % Place 1 patch onto the skin once daily. Remove &  Discard patch within 12 hours or as directed by MD    magnesium oxide (MAG-OXIDE ORAL) Take by mouth once daily.    meclizine (ANTIVERT) 25 mg tablet Take 1 tablet (25 mg total) by mouth 3 (three) times daily as needed for Dizziness or Nausea.    mupirocin (BACTROBAN) 2 % ointment Apply topically 2 (two) times daily.    nicotine (NICODERM CQ) 14 mg/24 hr Place 1 patch onto the skin once daily.    ondansetron (ZOFRAN) 8 MG tablet Take 1 tablet (8 mg total) by mouth every 8 (eight) hours as needed for Nausea.    ondansetron (ZOFRAN-ODT) 4 MG TbDL Take 1 tablet (4 mg total) by mouth every 8 (eight) hours as needed (nausea).    QUEtiapine (SEROQUEL XR) 300 MG Tb24 Take 300 mg by mouth every evening.    venlafaxine (EFFEXOR-XR) 150 MG Cp24 Take 150 mg by mouth once daily.    vitamin E 100 UNIT capsule Take 200 Units by mouth once daily.       Review of patient's allergies indicates:   Allergen Reactions    Grass pollen-june grass standard Itching       Past Medical History:   Diagnosis Date    Asthma     Bipolar 1 disorder     History of MRSA infection     Manic depression     Seizures      Past Surgical History:   Procedure Laterality Date    APPENDECTOMY      CARPAL TUNNEL RELEASE      COSMETIC SURGERY      breast implants     HERNIA REPAIR      HYSTERECTOMY      INSERTION OF BREAST IMPLANT      KNEE ARTHROPLASTY Right 6/1/2020    Procedure: ARTHROPLASTY, KNEE;  Surgeon: Vasiliy Ivory MD;  Location: [facility];  Service: Orthopedics;  Laterality: Right;     Family History       Problem Relation (Age of Onset)    Diabetes Father    Heart disease Father    Schizophrenia Mother          Tobacco Use    Smoking status: Every Day     Current packs/day: 0.50     Average packs/day: 0.5 packs/day for 30.0 years (15.0 ttl pk-yrs)     Types: Cigarettes    Smokeless tobacco: Never   Substance and Sexual Activity    Alcohol use: No    Drug use: Yes     Types: Marijuana     Comment: last smoked today, states this is prescribed to  me    Sexual activity: Yes     Partners: Female     Review of Systems   Constitutional:  Negative for unexpected weight change.   HENT:  Positive for ear pain. Negative for hearing loss.    Eyes:  Negative for pain.   Respiratory:  Negative for stridor.    Gastrointestinal:  Negative for nausea.   Musculoskeletal:  Positive for arthralgias.   Skin:  Positive for color change.   Neurological:  Negative for facial asymmetry.   Hematological:  Negative for adenopathy.     Objective:     Vital Signs (Most Recent):  Temp: 97.7 °F (36.5 °C) (03/25/25 1245)  Pulse: 97 (03/25/25 1245)  Resp: 18 (03/25/25 1245)  BP: 129/72 (03/25/25 1245)  SpO2: 98 % (03/25/25 1245) Vital Signs (24h Range):  Temp:  [97.7 °F (36.5 °C)] 97.7 °F (36.5 °C)  Pulse:  [97] 97  Resp:  [18] 18  SpO2:  [98 %] 98 %  BP: (129)/(72) 129/72     Weight: 58.1 kg (128 lb)  Body mass index is 25 kg/m².        Physical Exam  HENT:      Left Ear:  No middle ear effusion. Tympanic membrane is not erythematous.      Ears:      Comments: See photo below; palpation of ear without ballotable fluid or abscess. Mild subjective ttp. Areas of erythema, possible cyst at junction of helix and antihelix superiorly ; erythema of conchal bowl , erythema does not extend into ear canal. No drainage nor swelling of ear canal     Mouth/Throat:      Lips: Pink.   Eyes:      General: No scleral icterus.  Pulmonary:      Effort: Pulmonary effort is normal.      Breath sounds: No stridor.   Lymphadenopathy:      Cervical: No cervical adenopathy.   Neurological:      Mental Status: She is alert.         Significant Labs:  None today , cbc from 3-11-25 reviewed- no leukocytosis    Significant Diagnostics:  Ct from 3-11-25 and 2024 reviewed images personally as well as report. Helix partially cut off on 2025 images but no overt abscess  3-2025    7-2024    Hypoechoic area       April 2024 November 2023    Assessment/Plan:   Perichondritis of ear  Cellulitis of ear    VTE Risk  Mitigation (From admission, onward)      None            Thank you for your consult. I will sign off. Please contact us if you have any additional questions.    Zahida Weir MD  Otorhinolaryngology-Head & Neck Surgery  [facility] - [place] - Emergency Dept

## 2025-03-25 NOTE — ED PROVIDER NOTES
Encounter Date: 3/25/2025    SCRIBE #1 NOTE: I, Sweta Newsome, sudha scribing for, and in the presence of,  Mitch Briggs MD. I have scribed the following portions of the note - Other sections scribed: HPI, ROS, PE.       History     Chief Complaint   Patient presents with    Otalgia     Pt c/o pain to left ear with swelling x3 years. Pt stated her ear has gradually been getting bigger over time. Pt denied drainage.     Alina Menchaca is a 54 y.o. female, with a PMHx of MRSA infections, asthma, who presents to the ED with pain behind the left ear x 3 years, which worsened today. Per chart review, the patient was evaluated at [facility] on 03/11/2025 for similar complaint. WBC within normal limits. CT temporal bone showed Stable thickening and hyperenhancement of the left external ear.  Correlate for previous trauma and chondromalacia versus inflammatory infectious or neoplastic process unchanged significantly since the prior exam. No drainable fluid collections or findings of otomastoiditis. Patient was referred to ENT to obtain a biopsy of the area, however she states she was never told any of this information.  No other exacerbating or alleviating factors. Patient has no other complaints at the present time.         The history is provided by the patient and medical records. No  was used.     Review of patient's allergies indicates:   Allergen Reactions    Grass pollen-june grass standard Itching     Past Medical History:   Diagnosis Date    Asthma     Bipolar 1 disorder     History of MRSA infection     Manic depression     Seizures      Past Surgical History:   Procedure Laterality Date    APPENDECTOMY      CARPAL TUNNEL RELEASE      COSMETIC SURGERY      breast implants     HERNIA REPAIR      HYSTERECTOMY      INSERTION OF BREAST IMPLANT      KNEE ARTHROPLASTY Right 6/1/2020    Procedure: ARTHROPLASTY, KNEE;  Surgeon: Vasiliy Ivory MD;  Location: [facility] OR;  Service:  Orthopedics;  Laterality: Right;     Family History   Problem Relation Name Age of Onset    Schizophrenia Mother      Diabetes Father      Heart disease Father       Social History[1]  Review of Systems   Constitutional: Negative.    HENT:          + pain behind left ear.    Eyes: Negative.    Respiratory: Negative.     Cardiovascular: Negative.    Gastrointestinal: Negative.    Endocrine: Negative.    Genitourinary: Negative.    Musculoskeletal: Negative.    Skin: Negative.    Allergic/Immunologic: Negative.    Neurological: Negative.    Hematological: Negative.    Psychiatric/Behavioral: Negative.     All other systems reviewed and are negative.      Physical Exam     Initial Vitals [03/25/25 1245]   BP Pulse Resp Temp SpO2   129/72 97 18 97.7 °F (36.5 °C) 98 %      MAP       --         Physical Exam    Constitutional: Vital signs are normal. She appears well-developed. She is active and cooperative.   HENT:   Head: Normocephalic and atraumatic.   Left mastoid tenderness and chondral swelling. No involvement of the left TM. Patient able to talk without difficulty.    Eyes: Conjunctivae, EOM and lids are normal. Pupils are equal, round, and reactive to light.   Neck: Trachea normal. Neck supple. No thyroid mass present.    Full passive range of motion without pain.     Cardiovascular:  Normal rate, regular rhythm, S1 normal, S2 normal, normal heart sounds, intact distal pulses and normal pulses.           Abdominal: Abdomen is soft. Bowel sounds are normal.   Musculoskeletal:         General: Normal range of motion.      Cervical back: Full passive range of motion without pain and neck supple.     Lymphadenopathy:     She has no axillary adenopathy.   Neurological: She is alert and oriented to person, place, and time.   Skin: Skin is warm, dry and intact.   Psychiatric: She has a normal mood and affect. Her speech is normal and behavior is normal. Judgment and thought content normal. Cognition and memory are  "normal.         ED Course   Procedures  Labs Reviewed - No data to display       Imaging Results    None          Medications - No data to display  Medical Decision Making  Amount and/or Complexity of Data Reviewed  External Data Reviewed: notes.     Details: See HPI.            Scribe Attestation:   Scribe #1: I performed the above scribed service and the documentation accurately describes the services I performed. I attest to the accuracy of the note.                           ***    Clinical Impression:   ***Please document a Clinical Impression and click the "Refresh" button to refresh your note and automatically pull in before signing.***                [1]   Social History  Tobacco Use    Smoking status: Every Day     Current packs/day: 0.50     Average packs/day: 0.5 packs/day for 30.0 years (15.0 ttl pk-yrs)     Types: Cigarettes    Smokeless tobacco: Never   Substance Use Topics    Alcohol use: No    Drug use: Yes     Types: Marijuana     Comment: last smoked today, states this is prescribed to me     "

## 2025-03-31 ENCOUNTER — OFFICE VISIT (OUTPATIENT)
Dept: OTOLARYNGOLOGY | Facility: CLINIC | Age: 55
End: 2025-03-31
Payer: MEDICAID

## 2025-03-31 VITALS
SYSTOLIC BLOOD PRESSURE: 147 MMHG | HEIGHT: 60 IN | DIASTOLIC BLOOD PRESSURE: 80 MMHG | BODY MASS INDEX: 24.06 KG/M2 | WEIGHT: 122.56 LBS

## 2025-03-31 DIAGNOSIS — K11.8 PAROTID GLAND FULLNESS: ICD-10-CM

## 2025-03-31 DIAGNOSIS — Q18.0 FIRST BRANCHIAL CLEFT CYST: ICD-10-CM

## 2025-03-31 DIAGNOSIS — H92.09 OTALGIA, UNSPECIFIED LATERALITY: ICD-10-CM

## 2025-03-31 DIAGNOSIS — M95.12 CAULIFLOWER EAR, LEFT: ICD-10-CM

## 2025-03-31 DIAGNOSIS — Q18.1 AURICULAR CYST: Primary | ICD-10-CM

## 2025-03-31 PROCEDURE — 3008F BODY MASS INDEX DOCD: CPT | Mod: CPTII,S$GLB,, | Performed by: OTOLARYNGOLOGY

## 2025-03-31 PROCEDURE — 3079F DIAST BP 80-89 MM HG: CPT | Mod: CPTII,S$GLB,, | Performed by: OTOLARYNGOLOGY

## 2025-03-31 PROCEDURE — 1159F MED LIST DOCD IN RCRD: CPT | Mod: CPTII,S$GLB,, | Performed by: OTOLARYNGOLOGY

## 2025-03-31 PROCEDURE — 3077F SYST BP >= 140 MM HG: CPT | Mod: CPTII,S$GLB,, | Performed by: OTOLARYNGOLOGY

## 2025-03-31 PROCEDURE — 99213 OFFICE O/P EST LOW 20 MIN: CPT | Mod: S$GLB,,, | Performed by: OTOLARYNGOLOGY

## 2025-03-31 NOTE — PROGRESS NOTES
OTOLARYNGOLOGY CLINIC NOTE  Date:  03/31/2025     Chief complaint:  Chief Complaint   Patient presents with    chondritis     For 3 years       History of Present Illness  Alina Menchaca is a 54 y.o. female  presenting today for a followup. Here today with friend. States antibiotics helped some but she is still having pain and discomfort related to the ear. She states that also gets worse if she stops the antibiotics,     I saw her in the er - see notes from that date regarding history with ear     She is still having pain. Also she is concerned about hair loss. Feels full in parotid area. She has been taking abx as instructed and does feel it has helped but still with pain and she is concerned will worsen when off of antibiotics.       Past Medical History  Past Medical History:   Diagnosis Date    Asthma     Bipolar 1 disorder     History of MRSA infection     Manic depression     Seizures         Past Surgical History  Past Surgical History:   Procedure Laterality Date    APPENDECTOMY      CARPAL TUNNEL RELEASE      COSMETIC SURGERY      breast implants     HERNIA REPAIR      HYSTERECTOMY      INSERTION OF BREAST IMPLANT      KNEE ARTHROPLASTY Right 6/1/2020    Procedure: ARTHROPLASTY, KNEE;  Surgeon: Vasiliy Ivory MD;  Location: [facility];  Service: Orthopedics;  Laterality: Right;        Medications  Medications Ordered Prior to Encounter[1]    Review of Systems  Review of Systems   Constitutional:  Positive for malaise/fatigue.   HENT:  Positive for ear discharge, ear pain, hearing loss and sore throat.    Eyes: Negative.    Respiratory:  Positive for cough and shortness of breath.    Cardiovascular: Negative.    Gastrointestinal: Negative.    Skin:  Positive for rash.   Neurological: Negative.       Answers submitted by the patient for this visit:  Review of Symptoms Questionnaire  (Submitted on 3/31/2025)  appetite change : Yes  Unexpected weight loss?: Yes  Ear infection(s)?: Yes  facial  swelling: Yes  sinus pressure : Yes  Sinus infection(s)?: Yes  trouble swallowing: Yes  Urinating too frequently?: Yes  Muscle aches / pain?: Yes  Seasonal Allergies?: Yes  None of these : Yes  None of these: Yes  sleep disturbance: Yes  Social History   reports that she has been smoking cigarettes. She has a 15 pack-year smoking history. She has never used smokeless tobacco. She reports current drug use. Drug: Marijuana. She reports that she does not drink alcohol.     Family History  Family History   Problem Relation Name Age of Onset    Schizophrenia Mother      Diabetes Father      Heart disease Father          Physical Exam   Vitals:    03/31/25 1007   BP: (!) 147/80    Body mass index is 23.94 kg/m².  Weight: 55.6 kg (122 lb 9.2 oz)   Height: 5' (152.4 cm)     GENERAL: no acute distress.  HEAD: normocephalic.   EYES: No scleral icterus  EARS: External auditory canal with normal cerumen, tympanic membrane fully visible, no perforation , no retraction. No middle ear effusion. Ossicles intact.   Cauliflower ear , small subcutaneous cyst near helical fold slightly smaller but overall essentially unchanged     NOSE: external nose without significant bony abnormality  ORAL CAVITY/OROPHARYNX: tongue mobile.   NECK: trachea midline. Slight fullness of parotid on left , none on right  LYMPH NODES:No cervical lymphadenopathy.  RESPIRATORY: no stridor, no stertor. Voice normal. Respirations nonlabored.  NEURO: alert, responds to questions appropriately.    PSYCH:mood appropriate      Imaging:  The patient does not have any new imaging of the head and neck since last visit.     Labs:  CBC  Recent Labs   Lab 07/02/24  2235 10/19/24  0038 03/11/25  1732   WBC 6.96 9.44 7.48   Hemoglobin 13.4 14.4 14.1   Hematocrit 37.5 41.0 39.2   MCV 90 91 91   Platelets 208 224 250     BMP  Recent Labs   Lab 07/02/24 2235 07/24/24  2139 10/19/24  0038 02/09/25  2235 03/11/25  1732   Glucose 77  --  98  --  75   Sodium 139   < > 142 148 H  142   Potassium 3.7   < > 3.3 L 3.9 4.4   Chloride 106  --  107  --  106   CO2 19 L  --  18 L  --  24   Carbon Dioxide  --    < >  --  24  --    BUN 17   < > 14 14.0 11   Creatinine 0.8   < > 0.8 0.60 0.7   Calcium 9.5   < > 9.2 8.9 8.9    < > = values in this interval not displayed.     COAGS  Recent Labs   Lab 12/04/22  0313   INR 1.0       Assessment  1. Auricular cyst  - MRI Maxillofacial W W/O Contrast; Future  - Creatinine, Serum; Future  - Ambulatory referral/consult to Pain Clinic; Future  - CT Soft Tissue Neck With Contrast; Future    2. Parotid gland fullness  - MRI Maxillofacial W W/O Contrast; Future  - Creatinine, Serum; Future  - Ambulatory referral/consult to Pain Clinic; Future  - CT Soft Tissue Neck With Contrast; Future    3. First branchial cleft cyst  - CT Soft Tissue Neck With Contrast; Future    4. Cauliflower ear, left  - CT Soft Tissue Neck With Contrast; Future    5. Otalgia, unspecified laterality       Plan:  Discussed plan of care with patient in detail and all questions answered. Patient reported understanding of plan of care. I gave the patient the opportunity to ask questions and patient confirmed all questions answered to satisfaction.     May have had first branchial cleft cyst -concerns for cauliflower ear ; possible initially had first branchial cleft cyst if that is getting recurrently infected would cause pain but would not be getting re-infected immediately after going off of antibiotics. After exam today as well I am less suspicious of infection ; will refer to pain specialist for chronic pain - unknown etiology ?neuropathic pain from prior procedures. Appearance not much different than when seen so again I am less suspicious that she had an acute infection with exam today compared to in er   Will also be able to assess parotid area- slight fullness compared to opposite side but no overt mass    Less suspicious that malignancy but will get mri and have her follow up with  results of that and also sent for DEDRA from dr lau office - will decide on plans     F/u after imaging                 [1]   Current Outpatient Medications on File Prior to Visit   Medication Sig Dispense Refill    amoxicillin-clavulanate 875-125mg (AUGMENTIN) 875-125 mg per tablet Take 1 tablet by mouth 2 (two) times daily. 14 tablet 0    FLUoxetine 20 MG capsule Take 40 mg by mouth once daily.      fluticasone-vilanterol (BREO) 100-25 mcg/dose diskus inhaler Inhale 1 puff into the lungs once daily. Controller 60 each 3    HYDROcodone-acetaminophen (NORCO) 5-325 mg per tablet Take 1 tablet by mouth every 6 (six) hours as needed for Pain. 10 tablet 0    HYDROcodone-acetaminophen (NORCO) 5-325 mg per tablet Take 1 tablet by mouth every 4 (four) hours as needed for Pain. 18 tablet 0    ibuprofen (ADVIL,MOTRIN) 800 MG tablet Take 1 tablet (800 mg total) by mouth every 6 (six) hours as needed for Pain. 20 tablet 0    ipratropium-albuteroL (COMBIVENT)  mcg/actuation inhaler Inhale 1 puff into the lungs every 6 (six) hours as needed for Wheezing. Rescue 4 g 0    lidocaine (LIDODERM) 5 % Place 1 patch onto the skin once daily. Remove & Discard patch within 12 hours or as directed by MD 15 patch 0    mupirocin (BACTROBAN) 2 % ointment Apply topically 2 (two) times daily. 15 g 0    naproxen (NAPROSYN) 500 MG tablet Take 1 tablet (500 mg total) by mouth 2 (two) times daily with meals. 20 tablet 0    nicotine (NICODERM CQ) 14 mg/24 hr Place 1 patch onto the skin once daily. 30 patch 1    ondansetron (ZOFRAN) 8 MG tablet Take 1 tablet (8 mg total) by mouth every 8 (eight) hours as needed for Nausea. 15 tablet 0    ondansetron (ZOFRAN-ODT) 4 MG TbDL Take 1 tablet (4 mg total) by mouth every 8 (eight) hours as needed (nausea). 15 tablet 0    venlafaxine (EFFEXOR-XR) 150 MG Cp24 Take 150 mg by mouth once daily.      vitamin E 100 UNIT capsule Take 200 Units by mouth once daily.      albuterol (PROVENTIL) 2.5 mg /3 mL  (0.083 %) nebulizer solution Take 3 mLs (2.5 mg total) by nebulization every 6 (six) hours as needed for Wheezing. 30 Box 0    aspirin 81 MG Chew Take 1 tablet (81 mg total) by mouth once daily.  0    azithromycin (Z-CY) 250 MG tablet Take 2 tablets (500 mg total) by mouth once daily. Take first 2 tablets together, then 1 every day until finished. 3 tablet 0    cetirizine (ZYRTEC) 10 MG tablet Take 1 tablet (10 mg total) by mouth once daily. for 12 days 12 tablet 0    divalproex (DEPAKOTE) 250 MG EC tablet Take 1 tablet (250 mg total) by mouth every 8 (eight) hours. 90 tablet 3    ketorolac (TORADOL) 10 mg tablet Take 1 tablet (10 mg total) by mouth every 6 (six) hours as needed for Pain. Take with food. 20 tablet 1    magnesium oxide (MAG-OXIDE ORAL) Take by mouth once daily.      meclizine (ANTIVERT) 25 mg tablet Take 1 tablet (25 mg total) by mouth 3 (three) times daily as needed for Dizziness or Nausea. 30 tablet 0    QUEtiapine (SEROQUEL XR) 300 MG Tb24 Take 300 mg by mouth every evening.       No current facility-administered medications on file prior to visit.

## 2025-04-01 ENCOUNTER — DOCUMENTATION ONLY (OUTPATIENT)
Dept: OTOLARYNGOLOGY | Facility: CLINIC | Age: 55
End: 2025-04-01
Payer: MEDICAID

## 2025-04-02 ENCOUNTER — DOCUMENTATION ONLY (OUTPATIENT)
Dept: OTOLARYNGOLOGY | Facility: CLINIC | Age: 55
End: 2025-04-02
Payer: MEDICAID

## 2025-04-07 ENCOUNTER — NURSE TRIAGE (OUTPATIENT)
Dept: ADMINISTRATIVE | Facility: CLINIC | Age: 55
End: 2025-04-07
Payer: MEDICAID

## 2025-04-07 NOTE — TELEPHONE ENCOUNTER
Spoke with patient who states she has yellow pus coming from her ears.  Patient states she also running a fever.  Patient initially stated yes to temp higher than 103.0.  Patient states she has a scheduled procedure today at [facility].  Advised ED per protocol.  Patient then hung up the phone. Message sent to Dr. Weir office for follow up.     Reason for Disposition   Fever > 103 F (39.4 C)    Additional Information   Negative: Bloody or clear ear discharge and after a head or face injury   Negative: Unexplained bleeding and lasts > 10 minutes or large amount  (Exception: If a few drops of blood, continue with triage.)    Protocols used: Ear - Bqigrgsan-E-LH

## 2025-04-09 ENCOUNTER — HOSPITAL ENCOUNTER (OUTPATIENT)
Dept: RADIOLOGY | Facility: HOSPITAL | Age: 55
Discharge: HOME | End: 2025-04-09
Attending: OTOLARYNGOLOGY
Payer: MEDICAID

## 2025-04-09 DIAGNOSIS — Q18.1 AURICULAR CYST: ICD-10-CM

## 2025-04-09 DIAGNOSIS — M95.12 CAULIFLOWER EAR, LEFT: ICD-10-CM

## 2025-04-09 DIAGNOSIS — K11.8 PAROTID GLAND FULLNESS: ICD-10-CM

## 2025-04-09 DIAGNOSIS — Q18.0 FIRST BRANCHIAL CLEFT CYST: ICD-10-CM

## 2025-04-09 PROCEDURE — 25500020 PHARM REV CODE 255: Performed by: OTOLARYNGOLOGY

## 2025-04-09 PROCEDURE — 70491 CT SOFT TISSUE NECK W/DYE: CPT | Mod: 26,,, | Performed by: RADIOLOGY

## 2025-04-09 PROCEDURE — 70491 CT SOFT TISSUE NECK W/DYE: CPT | Mod: TC

## 2025-04-09 RX ADMIN — IOHEXOL 75 ML: 350 INJECTION, SOLUTION INTRAVENOUS at 02:04

## 2025-04-16 ENCOUNTER — TELEPHONE (OUTPATIENT)
Dept: OTOLARYNGOLOGY | Facility: CLINIC | Age: 55
End: 2025-04-16
Payer: MEDICAID

## 2025-04-16 NOTE — TELEPHONE ENCOUNTER
Spoke with pt states hurting so bad she had to cancel MRI and reschedule to 04/30/2025 so wanted to move follow up appt back, appt moved, but then stated having ear pain so bad, making Left side neck pain and gland so swollen causing swallowing issues. States got worse since been off antibiotics. Informed would get with Dr. Weir and call her back.        Called pt back to see if pain was better on antibiotics per Dr. Weir pt stated no improvement, and can see pt tomorrow if wants or if was better can call in a different antibiotic. Informed pt and pt states would like to be seen, states when she pinched ear had pus come out and that never happened before, states would like Dr. Weir to see if before prescribing meds. , appt scheduled for tomorrow, pt verb understanding.

## 2025-04-16 NOTE — TELEPHONE ENCOUNTER
----- Message from Jen sent at 4/16/2025  1:15 PM CDT -----  ..Type:  Sooner Appointment RequestPatient is requesting a sooner appointment.  Patient declined first available appointment listed as well as another facility and provider .  Patient will not accept being placed on the waitlist and is requesting a message be sent to doctor.Name of Caller: SelfWhen is the first available appointment? 9/25/25Symptoms: f/u- CT and MRI resultsWould the patient rather a call back or a response via [facility]? Call [facility] Call Back Number: .[phone] (home) Additional Information:

## 2025-04-17 ENCOUNTER — PATIENT MESSAGE (OUTPATIENT)
Dept: OTOLARYNGOLOGY | Facility: CLINIC | Age: 55
End: 2025-04-17

## 2025-04-17 ENCOUNTER — TELEPHONE (OUTPATIENT)
Dept: OTOLARYNGOLOGY | Facility: CLINIC | Age: 55
End: 2025-04-17

## 2025-04-17 NOTE — TELEPHONE ENCOUNTER
Tried calling pt to see if her ear is better, she missed her appt this morning, unable to leave voicemail

## 2025-05-07 NOTE — PROGRESS NOTES
1901: removal of restraints upon pt request, pt verbalized understanding not to pull on medical devices or try to get out of bed. No injuries noted.    1950: pt requesting to be intubated, pt tachypneic, increased work of breathing noted. Dr. Cali notified and at bedside. Recommended pt to obtain ABG to assess respiratory status and pt got very argumentative and yelled at MD to get out. Spoke with Dr. Cali, MD does not believe pt needs to be intubated, will monitor.   Requested Prescriptions     Signed Prescriptions Disp Refills    apixaban (ELIQUIS) 5 MG TABS tablet 60 tablet 0     Sig: Take 0.5 tablets by mouth 2 times daily     Authorizing Provider: CESAR BRIGGS     Ordering User: PARI OWENS MD    Telephone call made to patient's wife. 2 PI verified. She had no idea the patient missed his last three appointments and that if he misses one more, he will be dismissed. She said she was in and out of the hospital and rehab and had no idea he was missing appointments. He has dimentia. She asked if he could see anyone, even an NP to see sooner. Placed follow up visit with NP.     Future Appointments   Date Time Provider Department Center   5/21/2025  1:00 PM Malgorzata Agustin ACNP [facility] AMB   5/27/2025  1:45 PM Valerie Tracey MD [facility] AM[facility]   7/11/2025 11:45 AM Lorrie Krugeer MD [facility] DEP

## 2025-05-30 ENCOUNTER — TELEPHONE (OUTPATIENT)
Dept: OTOLARYNGOLOGY | Facility: CLINIC | Age: 55
End: 2025-05-30
Payer: MEDICAID

## 2025-05-30 DIAGNOSIS — K11.8 PAROTID GLAND FULLNESS: ICD-10-CM

## 2025-05-30 DIAGNOSIS — Q18.1 AURICULAR CYST: Primary | ICD-10-CM

## 2025-05-30 NOTE — TELEPHONE ENCOUNTER
----- Message from Christos Mayberry sent at 5/30/2025  9:15 AM CDT -----  Type:  Sooner Appointment RequestPatient is requesting a sooner appointment.  Patient declined first available appointment listed as well as another facility and provider .  Patient will not accept being placed on the waitlist and is requesting a message be sent to doctor.Name of Caller:Pt When is the first available appointment?None Symptoms:Results , ear discharging infection . Instead of several cyst , its now only one Would the patient rather a call back or a response via [facility] Call Back Number:Telephone Information:Mobile          [phone] Additional Information:

## 2025-05-30 NOTE — TELEPHONE ENCOUNTER
Spoke with pt offered appt today for ear to be checked, pt states she would like to get the MRI done first then follow up, she said she isnt having any pain, and only has one cyst on ear now, so has improved, states missed last couple appts due to having a issue with alcohol and had to go to rehab but getting all her stuff together. Asked if I could get MRI scheduled and follow up and she would get the notifications once its done.

## 2025-06-12 ENCOUNTER — HOSPITAL ENCOUNTER (EMERGENCY)
Facility: HOSPITAL | Age: 55
Discharge: HOME | End: 2025-06-12
Attending: STUDENT IN AN ORGANIZED HEALTH CARE EDUCATION/TRAINING PROGRAM
Payer: MEDICAID

## 2025-06-12 VITALS
WEIGHT: 123 LBS | TEMPERATURE: 98 F | HEIGHT: 60 IN | SYSTOLIC BLOOD PRESSURE: 171 MMHG | HEART RATE: 91 BPM | DIASTOLIC BLOOD PRESSURE: 87 MMHG | OXYGEN SATURATION: 97 % | BODY MASS INDEX: 24.15 KG/M2 | RESPIRATION RATE: 20 BRPM

## 2025-06-12 DIAGNOSIS — R55 SYNCOPE: ICD-10-CM

## 2025-06-12 LAB
ABSOLUTE EOSINOPHIL (OHS): 0.27 K/UL
ABSOLUTE MONOCYTE (OHS): 0.66 K/UL (ref 0.3–1)
ABSOLUTE NEUTROPHIL COUNT (OHS): 6.51 K/UL (ref 1.8–7.7)
ALBUMIN SERPL BCP-MCNC: 3.3 G/DL (ref 3.5–5.2)
ALP SERPL-CCNC: 102 UNIT/L (ref 40–150)
ALT SERPL W/O P-5'-P-CCNC: 15 UNIT/L (ref 10–44)
ANION GAP (OHS): 9 MMOL/L (ref 8–16)
AST SERPL-CCNC: 23 UNIT/L (ref 11–45)
BASOPHILS # BLD AUTO: 0.05 K/UL
BASOPHILS NFR BLD AUTO: 0.5 %
BILIRUB SERPL-MCNC: 0.2 MG/DL (ref 0.1–1)
BUN SERPL-MCNC: 15 MG/DL (ref 6–20)
CALCIUM SERPL-MCNC: 8.4 MG/DL (ref 8.7–10.5)
CHLORIDE SERPL-SCNC: 106 MMOL/L (ref 95–110)
CO2 SERPL-SCNC: 26 MMOL/L (ref 23–29)
CREAT SERPL-MCNC: 0.6 MG/DL (ref 0.5–1.4)
ERYTHROCYTE [DISTWIDTH] IN BLOOD BY AUTOMATED COUNT: 11.9 % (ref 11.5–14.5)
GFR SERPLBLD CREATININE-BSD FMLA CKD-EPI: >60 ML/MIN/1.73/M2
GLUCOSE SERPL-MCNC: 75 MG/DL (ref 70–110)
HCT VFR BLD AUTO: 40.9 % (ref 37–48.5)
HGB BLD-MCNC: 14.1 GM/DL (ref 12–16)
IMM GRANULOCYTES # BLD AUTO: 0.06 K/UL (ref 0–0.04)
IMM GRANULOCYTES NFR BLD AUTO: 0.6 % (ref 0–0.5)
LYMPHOCYTES # BLD AUTO: 3.03 K/UL (ref 1–4.8)
MAGNESIUM SERPL-MCNC: 1.7 MG/DL (ref 1.6–2.6)
MCH RBC QN AUTO: 32.3 PG (ref 27–31)
MCHC RBC AUTO-ENTMCNC: 34.5 G/DL (ref 32–36)
MCV RBC AUTO: 94 FL (ref 82–98)
NUCLEATED RBC (/100WBC) (OHS): 0 /100 WBC
PHOSPHATE SERPL-MCNC: 4.2 MG/DL (ref 2.7–4.5)
PLATELET # BLD AUTO: 246 K/UL (ref 150–450)
PMV BLD AUTO: 8.6 FL (ref 9.2–12.9)
POCT GLUCOSE: 78 MG/DL (ref 70–110)
POTASSIUM SERPL-SCNC: 4.2 MMOL/L (ref 3.5–5.1)
PROT SERPL-MCNC: 6.9 GM/DL (ref 6–8.4)
RBC # BLD AUTO: 4.37 M/UL (ref 4–5.4)
RELATIVE EOSINOPHIL (OHS): 2.6 %
RELATIVE LYMPHOCYTE (OHS): 28.6 % (ref 18–48)
RELATIVE MONOCYTE (OHS): 6.2 % (ref 4–15)
RELATIVE NEUTROPHIL (OHS): 61.5 % (ref 38–73)
SODIUM SERPL-SCNC: 141 MMOL/L (ref 136–145)
TSH SERPL-ACNC: 3.63 UIU/ML (ref 0.4–4)
WBC # BLD AUTO: 10.58 K/UL (ref 3.9–12.7)

## 2025-06-12 PROCEDURE — 93010 ELECTROCARDIOGRAM REPORT: CPT | Mod: ,,, | Performed by: INTERNAL MEDICINE

## 2025-06-12 PROCEDURE — 25500020 PHARM REV CODE 255: Performed by: STUDENT IN AN ORGANIZED HEALTH CARE EDUCATION/TRAINING PROGRAM

## 2025-06-12 PROCEDURE — 85025 COMPLETE CBC W/AUTO DIFF WBC: CPT | Performed by: STUDENT IN AN ORGANIZED HEALTH CARE EDUCATION/TRAINING PROGRAM

## 2025-06-12 PROCEDURE — 82040 ASSAY OF SERUM ALBUMIN: CPT | Performed by: STUDENT IN AN ORGANIZED HEALTH CARE EDUCATION/TRAINING PROGRAM

## 2025-06-12 PROCEDURE — 99285 EMERGENCY DEPT VISIT HI MDM: CPT | Mod: 25

## 2025-06-12 PROCEDURE — 84443 ASSAY THYROID STIM HORMONE: CPT | Performed by: STUDENT IN AN ORGANIZED HEALTH CARE EDUCATION/TRAINING PROGRAM

## 2025-06-12 PROCEDURE — 93005 ELECTROCARDIOGRAM TRACING: CPT

## 2025-06-12 PROCEDURE — 96374 THER/PROPH/DIAG INJ IV PUSH: CPT | Mod: 59

## 2025-06-12 PROCEDURE — 84100 ASSAY OF PHOSPHORUS: CPT | Performed by: STUDENT IN AN ORGANIZED HEALTH CARE EDUCATION/TRAINING PROGRAM

## 2025-06-12 PROCEDURE — 83735 ASSAY OF MAGNESIUM: CPT | Performed by: STUDENT IN AN ORGANIZED HEALTH CARE EDUCATION/TRAINING PROGRAM

## 2025-06-12 PROCEDURE — 82962 GLUCOSE BLOOD TEST: CPT

## 2025-06-12 PROCEDURE — 63600175 PHARM REV CODE 636 W HCPCS: Mod: JZ,TB | Performed by: STUDENT IN AN ORGANIZED HEALTH CARE EDUCATION/TRAINING PROGRAM

## 2025-06-12 RX ORDER — KETOROLAC TROMETHAMINE 30 MG/ML
15 INJECTION, SOLUTION INTRAMUSCULAR; INTRAVENOUS
Status: COMPLETED | OUTPATIENT
Start: 2025-06-12 | End: 2025-06-12

## 2025-06-12 RX ADMIN — IOHEXOL 70 ML: 350 INJECTION, SOLUTION INTRAVENOUS at 07:06

## 2025-06-12 RX ADMIN — KETOROLAC TROMETHAMINE 15 MG: 30 INJECTION, SOLUTION INTRAMUSCULAR at 06:06

## 2025-06-12 NOTE — ED TRIAGE NOTES
Alina Menchaca, a 55 y.o. female presents to the ED w/ complaint of Dizziness. Pt reports outside by the pool today when she began to feel dizzy, heard a pop in her left ear, and vomited. Pt reports complaints to left ear x 3 years, swelling and small amount of discharge noted. Pt states she has been sober from alcohol x 17 days but relapsed yesterday. Pt denies LOC/fall. Pt denies CP, SOB, N/V/D. Pt is AAOx4.

## 2025-06-13 LAB
OHS QRS DURATION: 76 MS
OHS QTC CALCULATION: 435 MS

## 2025-06-13 NOTE — ED PROVIDER NOTES
"Encounter Date: 6/12/2025    SCRIBE #1 NOTE: I, Shyann Naidu, am scribing for, and in the presence of,  Madie Fletcher DO. I have scribed the following portions of the note - Other sections scribed: HPI, ROS, Physical Exam.       History     Chief Complaint   Patient presents with    Loss of Consciousness     Pt arrived via ems,pt chief complaint is syncope. Pt states was seated and tugged on her ear, and then got very weak had a near syncopal episode.      Alina Menchaca is a 55 y.o. female, with a PMHx of Bipolar affect, depressed (CMS/HCC), Left breast mass, Breast implant rupture , who presents to the ED following a syncopal episode just PTA. Patient reports 4 days prior her left ear began to emit puss, blood, and a foul odor. She states today she was sitting at the pool, pulled on her left ear due to irritation, and experienced a syncopal episode. Patient endorses associated lightheadedness, "popping" sensation, "skin crawling", and metal taste in her mouth. Per chart review, patient has been diagnosed with an Auricular cyst, notes left ear swelling for the past 3 years. She denies any head trauma following her syncopal episode. Endorses associated left ear pain, left-sided jaw pain, nausea and vomiting. Of note, patient has a history of alcoholism and was sober for 17 days before relapsing yesterday. No other exacerbating or alleviating factors. Denies CP, SOB, open wounds or other associated symptoms. Patient endorses tobacco use, notes she smokes 10 cigarettes daily.       The history is provided by the patient and medical records. No  was used.     Review of patient's allergies indicates:   Allergen Reactions    Grass pollen-june grass standard Itching     Past Medical History:   Diagnosis Date    Asthma     Bipolar 1 disorder     History of MRSA infection     Manic depression     Seizures      Past Surgical History:   Procedure Laterality Date    APPENDECTOMY      " CARPAL TUNNEL RELEASE      COSMETIC SURGERY      breast implants     HERNIA REPAIR      HYSTERECTOMY      INSERTION OF BREAST IMPLANT      KNEE ARTHROPLASTY Right 6/1/2020    Procedure: ARTHROPLASTY, KNEE;  Surgeon: Vasiliy Ivory MD;  Location: [facility];  Service: Orthopedics;  Laterality: Right;     Family History   Problem Relation Name Age of Onset    Schizophrenia Mother      Diabetes Father      Heart disease Father       Social History[1]  Review of Systems   Constitutional:  Negative for chills and fever.        (+) skin crawling sensation     HENT:  Positive for ear discharge (left, foul smelling, contains blood), ear pain (left) and facial swelling (left ear). Negative for congestion, rhinorrhea and sore throat.         (+) left-sided jaw pain  (+) metallic taste in mouth     Eyes:  Negative for visual disturbance.   Respiratory:  Negative for cough and shortness of breath.    Cardiovascular:  Negative for chest pain.   Gastrointestinal:  Positive for nausea and vomiting. Negative for abdominal pain and diarrhea.   Genitourinary:  Negative for dysuria, frequency and hematuria.   Musculoskeletal:  Negative for back pain.   Skin:  Negative for rash.   Neurological:  Positive for syncope and light-headedness. Negative for dizziness, weakness and headaches.       Physical Exam     Initial Vitals [06/12/25 1742]   BP Pulse Resp Temp SpO2   (!) 144/90 108 18 98.2 °F (36.8 °C) 98 %      MAP       --         Physical Exam    Nursing note and vitals reviewed.  Constitutional: She appears well-developed and well-nourished. She is not diaphoretic.  Non-toxic appearance. She does not have a sickly appearance. She does not appear ill.   HENT:   Head: Normocephalic and atraumatic.   Left Ear: There is drainage (cloudy, white) and swelling (external).   Narrowing of the left external ear canal. See image    Eyes: Conjunctivae and EOM are normal. Pupils are equal, round, and reactive to light. No scleral icterus.    Neck: Neck supple.   Normal range of motion.  Cardiovascular:  Normal rate, regular rhythm, normal heart sounds and intact distal pulses.     Exam reveals no gallop and no friction rub.       No murmur heard.  Pulses:       Radial pulses are 2+ on the right side and 2+ on the left side.        Dorsalis pedis pulses are 2+ on the right side and 2+ on the left side.        Posterior tibial pulses are 2+ on the right side and 2+ on the left side.   Pulmonary/Chest: Breath sounds normal. No respiratory distress. She has no wheezes. She has no rhonchi. She has no rales.   Abdominal: Abdomen is soft. There is no abdominal tenderness.   Musculoskeletal:         General: No edema. Normal range of motion.      Cervical back: Normal range of motion and neck supple.     Neurological: She is alert and oriented to person, place, and time. GCS score is 15. GCS eye subscore is 4. GCS verbal subscore is 5. GCS motor subscore is 6.   Skin: Skin is warm and dry.   Psychiatric: She has a normal mood and affect.         ED Course   Procedures  Labs Reviewed   COMPREHENSIVE METABOLIC PANEL - Abnormal       Result Value    Sodium 141      Potassium 4.2      Chloride 106      CO2 26      Glucose 75      BUN 15      Creatinine 0.6      Calcium 8.4 (*)     Protein Total 6.9      Albumin 3.3 (*)     Bilirubin Total 0.2            AST 23      ALT 15      Anion Gap 9      eGFR >60     CBC WITH DIFFERENTIAL - Abnormal    WBC 10.58      RBC 4.37      HGB 14.1      HCT 40.9      MCV 94      MCH 32.3 (*)     MCHC 34.5      RDW 11.9      Platelet Count 246      MPV 8.6 (*)     Nucleated RBC 0      Neut % 61.5      Lymph % 28.6      Mono % 6.2      Eos % 2.6      Basophil % 0.5      Imm Grans % 0.6 (*)     Neut # 6.51      Lymph # 3.03      Mono # 0.66      Eos # 0.27      Baso # 0.05      Imm Grans # 0.06 (*)    TSH - Normal    TSH 3.635     MAGNESIUM - Normal    Magnesium  1.7     PHOSPHORUS - Normal    Phosphorus Level 4.2     CBC W/  AUTO DIFFERENTIAL    Narrative:     The following orders were created for panel order CBC auto differential.  Procedure                               Abnormality         Status                     ---------                               -----------         ------                     CBC with Differential[[id number]]       Abnormal            Final result                 Please view results for these tests on the individual orders.   POCT GLUCOSE    POCT Glucose 78       EKG Readings: (Independently Interpreted)   EKG obtained at 6:22 p.m. shows normal sinus rhythm with a heart rate of 94 beats per minute, normal axis and intervals, T-wave flattening in aVL with nonspecific T-wave inversions in V1.  No reciprocal changes.  No acute ST segment changes.  EKG grossly unchanged when compared to previous EKG from 2020.      ECG Results              EKG 12-lead (Final result)        Collection Time Result Time QRS Duration OHS QTC Calculation    06/12/25 18:22:27 06/13/25 17:23:48 76 435                     Final result by Interface, [facility] (06/13/25 17:23:55)                   Narrative:    Test Reason : R55,    Vent. Rate :  94 BPM     Atrial Rate :  94 BPM     P-R Int : 154 ms          QRS Dur :  76 ms      QT Int : 348 ms       P-R-T Axes :  60  64  53 degrees    QTcB Int : 435 ms    Normal sinus rhythm  Normal ECG  When compared with ECG of 17-Apr-2020 17:51,  No significant change was found  Confirmed by Mitch Goldsmith ([id number]) on 6/13/2025 5:23:44 PM    Referred By: AAAREFERRAL SELF           Confirmed By: Mitch Goldsmith                                     EKG 12-lead (Final result)  Result time 06/13/25 14:43:13      Final result by Unknown User (06/13/25 14:43:13)                                      Imaging Results              CT Temporal Bone with Contrast (Final result)  Result time 06/12/25 19:56:02      Final result by Jennifer Damian MD (06/12/25 19:56:02)                   Impression:      1.  Redemonstration of chronic prominent abnormal soft tissue thickening and swelling of the left ear with mild resultant narrowing of the external auditory canal.  No obvious fluid collection or rim enhancing abscess seen.  2. No evidence of mastoiditis, as clinically questioned.      Electronically signed by: Jennifer Damian MD  Date:    06/12/2025  Time:    19:56               Narrative:    EXAMINATION:  CT TEMPORAL BONE WITH CONTRAST    CLINICAL HISTORY:  Mastoiditis;    TECHNIQUE:  Axial images were acquired through the temporal bones and skull base following administration of 70 cc Omnipaque 350 IV contrast.  Coronal and sagittal reconstructions were performed.    COMPARISON:  03/11/2025.    FINDINGS:  Right Temporal Bone:    *External ear: Normal.  *Middle ear: Normal.  *Petrous temporal bone/mastoid air cells: Clear. No fracture.  *Inner ear: Normal.  *IAC/CPA: Normal.  *Other: N/A.  .    Left Temporal Bone:    *External ear: Redemonstration of prominent abnormal soft tissue thickening and swelling of the left ear with mild increased enhancement.  No obvious fluid collection or abscess seen.  Mild resultant narrowing is seen of the external auditory canal.  *Middle ear: Normal.  *Petrous temporal bone/mastoid air cells: Clear. No fracture.  *Inner ear: Normal.  *IAC/CPA: Normal.  *Other: N/A.  .    Intracranial Compartment (limited evaluation): Normal.    Skull/Extracranial Contents (limited evaluation): Normal.                                       Medications   ketorolac injection 15 mg (15 mg Intravenous Given 6/12/25 1851)   iohexoL (OMNIPAQUE 350) injection 70 mL (70 mLs Intravenous Given 6/12/25 1918)     Medical Decision Making   MDM  This is an emergent evaluation of a 55 y.o. female, with a PMHx of Bipolar affect, depressed (CMS/HCC), Left breast mass, Breast implant rupture , who presents to the ED following a syncopal episode just PTA. Initial vitals in the ED [06/12/25 1742]  BP: (!) 144/90  Pulse:  108  Resp: 18  Temp: 98.2 °F (36.8 °C)  SpO2: 98 % .     Physical exam noted above. DDx includes but is not limited to anxiety, dehydration, JIMMY, electrolyte abnormality, orthostatic hypertension, hypoglycemia, symptomatic anemia, thyroid disorder. Also considered but clinically less likely to be sepsis, meningitis, brain abscess, mastoiditis, stroke, PE, dissection,ACS, ectopic pregnancy. Will obtain labs and imaging including CBC, CMP, poc glucose, TSH, mag, phos, CT temporal bone with contrast. Will also provide pain medication as needed. Will continue to monitor and frequently reassess pending results of labs, treatments and final disposition.    Patient is aware of plan and is amenable.     Madie Fletcher D.O  EMERGENCY MEDICINE  7:13 PM 06/12/2025    UPDATE  Labs unremarkable.  CT temporal bone with contrast shows chronic appearing prominence of abnormal soft tissue thickening and swelling of the left ear with mild narrowing of the external auditory canal.  No obvious fluid collection or rim enhancing abscess.  Patient was treated with IV Toradol for pain.  On reassessment, patient requesting discharge at this time.  Vitals reassuring.  Patient able to ambulate in the ED without difficulty or assistance.  Given history, presentation in the setting of a benign workup and exam here today, I feel symptoms are less likely due to a life-threatening cause.  Will discharge with close ENT follow-up, instructions for symptomatic treatment at home as well as ED return precautions.  Patient aware and agreeable to plan.    Madie Fletcher D.O  EMERGENCY MEDICINE   9:43 PM 06/12/2025       This chart was completed using dictation software, as a result there may be some transcription errors     Amount and/or Complexity of Data Reviewed  External Data Reviewed: notes.     Details: See HPI.  Labs: ordered. Decision-making details documented in ED Course.  Radiology: ordered and independent interpretation  performed. Decision-making details documented in ED Course.  ECG/medicine tests: ordered and independent interpretation performed. Decision-making details documented in ED Course.    Risk  Prescription drug management.            Scribe Attestation:   Scribe #1: I performed the above scribed service and the documentation accurately describes the services I performed. I attest to the accuracy of the note.                             I, Madie Fletcher, DO , personally performed the services described in this documentation. All medical record entries made by the scribe were at my direction and in my presence. I have reviewed the chart and agree that the record reflects my personal performance and is accurate and complete.      DISCLAIMER: This note was prepared with Vimessa voice recognition transcription software.     Clinical Impression:  Final diagnoses:  [R55] Syncope          ED Disposition Condition    Discharge Stable          ED Prescriptions    None       Follow-up Information       Follow up With Specialties Details Why Contact Info    Zahida Weir MD Otolaryngology Schedule an appointment as soon as possible for a visit  Emergency Room Follow-up, please keep previously scheduled follow-up appointment or see sooner if you have any other concerns [address] [place] [address]  [phone]      [facility] -  Schedule an appointment as soon as possible for a visit on 6/16/2025 Emergency Room Follow-up [address]  [address]  [facility] [address]  [phone]      [facility] [place] Emergency Medicine Go to  If symptoms worsen [address]  [facility] [place] [address] [phone]                     [1]   Social History  Tobacco Use    Smoking status: Every Day     Current packs/day: 0.50     Average packs/day: 0.5 packs/day for 30.0 years (15.0 ttl pk-yrs)     Types: Cigarettes    Smokeless  tobacco: Never   Substance Use Topics    Alcohol use: No    Drug use: Yes     Types: Marijuana     Comment: last smoked today, states this is prescribed to me        Madie Fletcher, DO  06/18/25 1513

## 2025-06-13 NOTE — DISCHARGE INSTRUCTIONS
Thank you for coming to our Emergency Department today. It is important to remember that some problems or medical conditions are difficult to diagnose and may not be found during your Emergency Department visit.     Be sure to follow up with your primary care doctor and review all labs/imaging/tests that were performed during your ER visit with them. Some labs/tests may be outside of the normal range and require non-emergent follow-up and further investigation to help diagnose/exclude/prevent complications or other potentially serious medical conditions that were not addressed during your ER visit.    If you do not have a primary care doctor, you may contact the one listed on your discharge paperwork or you may also call the [facility] Appointment Desk at [phone] to schedule an appointment and establish care with one. It is important to your health that you have a primary care doctor.    Please take all medications as directed. All medications may potentially have side-effects and it is impossible to predict which medications may give you side-effects or what side-effects (if any) they will give you.. If you feel that you are having a negative effect or side-effect of any medication you should immediately stop taking them and seek medical attention. If you feel that you are having a life-threatening reaction call 911.    Return to the ER with any questions/concerns, new/concerning symptoms, worsening or failure to improve.     Do not drive, swim, climb to height, take a bath, operate heavy machinery, drink alcohol or take potentially sedating medications, sign any legal documents or make any important decisions for 24 hours if you have received any pain medications, sedatives or mood altering drugs during your ER visit or within 24 hours of taking them if they have been prescribed to you.     You can find additional resources for Dentists, hearing aids, durable medical equipment, low cost pharmacies and  other resources at https://[facility].org    BELOW THIS LINE ONLY APPLIES IF YOU HAVE A COVID TEST PENDING OR IF YOU HAVE BEEN DIAGNOSED WITH COVID:  Please access [facility] to review the results of your test. Until the results of your COVID test return, you should isolate yourself so as not to potentially spread illness to others.   If your COVID test returns positive, you should isolate yourself so as not to spread illness to others. After five full days, if you are feeling better and you have not had fever for 24 hours, you can return to your typical daily activities, but you must wear a mask around others for an additional 5 days.   If your COVID test returns negative and you are either unvaccinated or more than six months out from your two-dose vaccine and are not yet boosted, you should still quarantine for 5 full days followed by strict mask use for an additional 5 full days.   If your COVID test returns negative and you have received your 2-dose initial vaccine as well as a booster, you should continue strict mask use for 10 full days after the exposure.  For all those exposed, best practice includes a test at day 5 after the exposure. This can be a home test or a test through one of the many testing centers throughout our community.   Masking is always advised to limit the spread of COVID. Cdc.gov is an excellent site to obtain the latest up to date recommendations regarding COVID and COVID testing.     CDC Testing and Quarantine Guidelines for patients with exposure to a known-positive COVID-19 person:  A close exposure is defined as anyone who has had an exposure (masked or unmasked) to a known COVID -19 positive person within 6 feet of someone for a cumulative total of 15 minutes or more over a 24-hour period.   Vaccinated and/or if you recently had a positive covid test within 90 days do NOT need to quarantine after contact with someone who had COVID-19 unless you develop symptoms.   Fully vaccinated  people who have not had a positive test within 90 days, should get tested 3-5 days after their exposure, even if they don't have symptoms and wear a mask indoors in public for 14 days following exposure or until their test result is negative.      Unvaccinated and/or NOT had a positive test within 90 days and meet close exposure  You are required by CDC guidelines to quarantine for at least 5 days from time of exposure followed by 5 days of strict masking. It is recommended, but not required to test after 5 days, unless you develop symptoms, in which case you should test at that time.  If you get tested after 5 days and your test is positive, your 5 day period of isolation starts the day of the positive test.    If your exposure does not meet the above definition, you can return to your normal daily activities to include social distancing, wearing a mask and frequent handwashing.      Here is a link to guidance from the CDC:  https://www.cdc.gov/media/releases/2021/s1227-isolation-quarantine-guidance.html      [place] Dept Of Health Testing Sites:  https://ldh.[place].gov/page/3934      [facility] website with testing locations and guidance:  [email or web address]

## 2025-06-27 ENCOUNTER — OFFICE VISIT (OUTPATIENT)
Dept: OTOLARYNGOLOGY | Facility: CLINIC | Age: 55
End: 2025-06-27
Payer: MEDICAID

## 2025-06-27 VITALS
SYSTOLIC BLOOD PRESSURE: 116 MMHG | WEIGHT: 123 LBS | DIASTOLIC BLOOD PRESSURE: 75 MMHG | BODY MASS INDEX: 24.15 KG/M2 | HEIGHT: 60 IN

## 2025-06-27 DIAGNOSIS — K11.8 PAROTID GLAND FULLNESS: ICD-10-CM

## 2025-06-27 DIAGNOSIS — Q18.0 FIRST BRANCHIAL CLEFT CYST: ICD-10-CM

## 2025-06-27 DIAGNOSIS — M95.12 CAULIFLOWER EAR, LEFT: ICD-10-CM

## 2025-06-27 DIAGNOSIS — H92.09 OTALGIA, UNSPECIFIED LATERALITY: Primary | ICD-10-CM

## 2025-06-27 PROCEDURE — 3074F SYST BP LT 130 MM HG: CPT | Mod: CPTII,S$GLB,, | Performed by: OTOLARYNGOLOGY

## 2025-06-27 PROCEDURE — 99214 OFFICE O/P EST MOD 30 MIN: CPT | Mod: S$GLB,,, | Performed by: OTOLARYNGOLOGY

## 2025-06-27 PROCEDURE — 1159F MED LIST DOCD IN RCRD: CPT | Mod: CPTII,S$GLB,, | Performed by: OTOLARYNGOLOGY

## 2025-06-27 PROCEDURE — 3008F BODY MASS INDEX DOCD: CPT | Mod: CPTII,S$GLB,, | Performed by: OTOLARYNGOLOGY

## 2025-06-27 PROCEDURE — 3078F DIAST BP <80 MM HG: CPT | Mod: CPTII,S$GLB,, | Performed by: OTOLARYNGOLOGY

## 2025-06-27 NOTE — PROGRESS NOTES
OTOLARYNGOLOGY CLINIC NOTE  Date:  06/27/2025     Chief complaint:  F/u ear issues      History of Present Illness  Alina Menchaca is a 55 y.o. female  presenting today for a followup.    She is adamant that she had an mri but she did not  She states er told her that they had ordered the test that I had recommended and had spoken with me? This was not documented in the note , I think she may be confused potentially. She states that she was told it would be next year in September 2026 before she could see pain medicine specialist  Continues to intermittently have drainage from the ear         Past Medical History  Past Medical History:   Diagnosis Date    Asthma     Bipolar 1 disorder     History of MRSA infection     Manic depression     Seizures         Past Surgical History  Past Surgical History:   Procedure Laterality Date    APPENDECTOMY      CARPAL TUNNEL RELEASE      COSMETIC SURGERY      breast implants     HERNIA REPAIR      HYSTERECTOMY      INSERTION OF BREAST IMPLANT      KNEE ARTHROPLASTY Right 6/1/2020    Procedure: ARTHROPLASTY, KNEE;  Surgeon: Vasiliy Ivory MD;  Location: [facility];  Service: Orthopedics;  Laterality: Right;        Medications  Medications Ordered Prior to Encounter[1]    Review of Systems  ROS     Social History   reports that she has been smoking cigarettes. She has a 15 pack-year smoking history. She has never used smokeless tobacco. She reports current drug use. Drug: Marijuana. She reports that she does not drink alcohol.     Family History  Family History   Problem Relation Name Age of Onset    Schizophrenia Mother      Diabetes Father      Heart disease Father          Physical Exam   Vitals:    06/27/25 0952   BP: 116/75    Body mass index is 24.03 kg/m².  Weight: 55.8 kg (123 lb 0.3 oz)   Height: 5' (152.4 cm)     GENERAL: no acute distress.  HEAD: normocephalic.   EYES: No scleral icterus  EARS: external ear -left thickened tissue unchanged, erythema better than  previously, serous drainage   External auditory canal with normal cerumen, tympanic membrane fully visible, no perforation , no retraction. No middle ear effusion. Ossicles intact.     NOSE: external nose without significant bony abnormality  ORAL CAVITY/OROPHARYNX: tongue mobile.   NECK: trachea midline. No parotid mass  LYMPH NODES:No cervical lymphadenopathy.  RESPIRATORY: no stridor, no stertor. Voice normal. Respirations nonlabored.  NEURO: alert, responds to questions appropriately.    PSYCH:mood appropriate      Imaging:  The patient does have any new imaging of the head and neck since last visit. Ct images and report personally reviewed      April 2025 March 2025    Labs:  CBC  Recent Labs   Lab 10/19/24  0038 03/11/25  1732 06/12/25  1821   WBC 9.44 7.48 10.58   Hemoglobin 14.4 14.1  --    HGB  --   --  14.1   Hematocrit 41.0 39.2  --    HCT  --   --  40.9   MCV 91 91 94   Platelet Count  --   --  246   Platelets 224 250  --      BMP  Recent Labs   Lab 10/19/24  0038 02/09/25 2235 03/11/25 1732 05/21/25  0122 06/12/25  1821   Glucose 98  --  75  --  75   Sodium 142   < > 142 139 141   Potassium 3.3 L   < > 4.4 4.4 4.2   Chloride 107  --  106  --  106   CO2 18 L  --  24  --  26   Carbon Dioxide  --    < >  --  25  --    BUN 14   < > 11 18.0 15   Creatinine 0.8   < > 0.7 1.13 H 0.6   Calcium 9.2   < > 8.9 10.0 8.4 L   Phosphorus Level  --   --   --   --  4.2   Magnesium   --   --   --   --  1.7    < > = values in this interval not displayed.     COAGS  Recent Labs   Lab 12/04/22  0313   INR 1.0       Assessment  1. Otalgia, unspecified laterality  - Ambulatory referral/consult to Pain Clinic; Future    2. First branchial cleft cyst  - MRI Maxillofacial W W/O Contrast; Future  - MRI IAC/Temporal Bones W W/O Contrast; Future    3. Parotid gland fullness  - MRI Maxillofacial W W/O Contrast; Future  - MRI IAC/Temporal Bones W W/O Contrast; Future    4. Cauliflower ear, left  - MRI Maxillofacial W W/O  Contrast; Future  - MRI IAC/Temporal Bones W W/O Contrast; Future       Plan:  Discussed plan of care with patient in detail and all questions answered. Patient reported understanding of plan of care. I gave the patient the opportunity to ask questions and patient confirmed all questions answered to satisfaction.     Needs mri   Discussed with her that she has had multiple procedures that have not helped- appears to be cauliflower ear in appearance. I do not see any changes on exam to suggest an underlying cancer after several visits but also unclear even where would be good place to biopsy to rule this out. Would like mri for better tissue dilineation unusual for cauliflower ear to drain, suspect may have underlying cyst and possible first branchial cleft cyst. Discussed with her that I joe like to make sure that if we do another procedure that is not only the correct one but one that I can personally do as may need to send her to ent subspecialist. Has some more swelling today but not erythema and with the drainage she has I suspect there is an underlying cyst although difficult to tell with ct scan . Offered biopsy but I think would help to get mri to decrease chance of false negative or sampling error as there is not any obvious superficial changes like an ulcer or other exam features to guide where to biopsy and swelling has gone up and down during time course that I have seen her and not extending to any new areas and no superficial lesions have come up    F/u asap after mri             [1]   Current Outpatient Medications on File Prior to Visit   Medication Sig Dispense Refill    amoxicillin-clavulanate 875-125mg (AUGMENTIN) 875-125 mg per tablet Take 1 tablet by mouth 2 (two) times daily. 14 tablet 0    FLUoxetine 20 MG capsule Take 40 mg by mouth once daily.      fluticasone-vilanterol (BREO) 100-25 mcg/dose diskus inhaler Inhale 1 puff into the lungs once daily. Controller 60 each 3     HYDROcodone-acetaminophen (NORCO) 5-325 mg per tablet Take 1 tablet by mouth every 6 (six) hours as needed for Pain. 10 tablet 0    HYDROcodone-acetaminophen (NORCO) 5-325 mg per tablet Take 1 tablet by mouth every 4 (four) hours as needed for Pain. 18 tablet 0    ibuprofen (ADVIL,MOTRIN) 800 MG tablet Take 1 tablet (800 mg total) by mouth every 6 (six) hours as needed for Pain. 20 tablet 0    ipratropium-albuteroL (COMBIVENT)  mcg/actuation inhaler Inhale 1 puff into the lungs every 6 (six) hours as needed for Wheezing. Rescue 4 g 0    lidocaine (LIDODERM) 5 % Place 1 patch onto the skin once daily. Remove & Discard patch within 12 hours or as directed by MD 15 patch 0    mupirocin (BACTROBAN) 2 % ointment Apply topically 2 (two) times daily. 15 g 0    naproxen (NAPROSYN) 500 MG tablet Take 1 tablet (500 mg total) by mouth 2 (two) times daily with meals. 20 tablet 0    nicotine (NICODERM CQ) 14 mg/24 hr Place 1 patch onto the skin once daily. 30 patch 1    ondansetron (ZOFRAN) 8 MG tablet Take 1 tablet (8 mg total) by mouth every 8 (eight) hours as needed for Nausea. 15 tablet 0    ondansetron (ZOFRAN-ODT) 4 MG TbDL Take 1 tablet (4 mg total) by mouth every 8 (eight) hours as needed (nausea). 15 tablet 0    venlafaxine (EFFEXOR-XR) 150 MG Cp24 Take 150 mg by mouth once daily.      vitamin E 100 UNIT capsule Take 200 Units by mouth once daily.      albuterol (PROVENTIL) 2.5 mg /3 mL (0.083 %) nebulizer solution Take 3 mLs (2.5 mg total) by nebulization every 6 (six) hours as needed for Wheezing. 30 Box 0    aspirin 81 MG Chew Take 1 tablet (81 mg total) by mouth once daily.  0    azithromycin (Z-CY) 250 MG tablet Take 2 tablets (500 mg total) by mouth once daily. Take first 2 tablets together, then 1 every day until finished. 3 tablet 0    cetirizine (ZYRTEC) 10 MG tablet Take 1 tablet (10 mg total) by mouth once daily. for 12 days 12 tablet 0    divalproex (DEPAKOTE) 250 MG EC tablet Take 1 tablet (250 mg  total) by mouth every 8 (eight) hours. 90 tablet 3    ketorolac (TORADOL) 10 mg tablet Take 1 tablet (10 mg total) by mouth every 6 (six) hours as needed for Pain. Take with food. 20 tablet 1    magnesium oxide (MAG-OXIDE ORAL) Take by mouth once daily.      meclizine (ANTIVERT) 25 mg tablet Take 1 tablet (25 mg total) by mouth 3 (three) times daily as needed for Dizziness or Nausea. 30 tablet 0    QUEtiapine (SEROQUEL XR) 300 MG Tb24 Take 300 mg by mouth every evening.       No current facility-administered medications on file prior to visit.

## 2025-07-01 ENCOUNTER — TELEPHONE (OUTPATIENT)
Dept: OTOLARYNGOLOGY | Facility: CLINIC | Age: 55
End: 2025-07-01
Payer: MEDICAID

## 2025-07-01 NOTE — TELEPHONE ENCOUNTER
Spoke with pt to see if she had pain management office she prefers, pt stated no, informed would send referral to [facility] pain management and once they contact her to scheduled appt, if to far out pt can call her insurance to see who they recommend and we can send refrral somewhere else. Pt agreed.  Will fax referral to [facility] pain management at [phone]

## 2025-07-06 ENCOUNTER — HOSPITAL ENCOUNTER (EMERGENCY)
Facility: HOSPITAL | Age: 55
Discharge: LEFT BEFORE DISCHARGE | End: 2025-07-06
Payer: MEDICAID

## 2025-07-06 VITALS
SYSTOLIC BLOOD PRESSURE: 110 MMHG | DIASTOLIC BLOOD PRESSURE: 64 MMHG | TEMPERATURE: 98 F | HEART RATE: 104 BPM | RESPIRATION RATE: 20 BRPM | HEIGHT: 60 IN | BODY MASS INDEX: 24.15 KG/M2 | WEIGHT: 123 LBS | OXYGEN SATURATION: 97 %

## 2025-07-06 PROCEDURE — 99900041 HC LEFT WITHOUT BEING SEEN- EMERGENCY

## 2025-07-12 ENCOUNTER — HOSPITAL ENCOUNTER (EMERGENCY)
Facility: HOSPITAL | Age: 55
Discharge: HOME | End: 2025-07-12
Attending: EMERGENCY MEDICINE
Payer: MEDICAID

## 2025-07-12 VITALS
BODY MASS INDEX: 25.8 KG/M2 | OXYGEN SATURATION: 98 % | HEART RATE: 87 BPM | HEIGHT: 59 IN | DIASTOLIC BLOOD PRESSURE: 98 MMHG | WEIGHT: 128 LBS | TEMPERATURE: 98 F | RESPIRATION RATE: 18 BRPM | SYSTOLIC BLOOD PRESSURE: 159 MMHG

## 2025-07-12 DIAGNOSIS — H61.032 CHONDRITIS OF AURICLE, LEFT: Primary | ICD-10-CM

## 2025-07-12 DIAGNOSIS — H92.02 OTALGIA OF LEFT EAR: ICD-10-CM

## 2025-07-12 PROCEDURE — 99284 EMERGENCY DEPT VISIT MOD MDM: CPT

## 2025-07-12 PROCEDURE — 25000003 PHARM REV CODE 250

## 2025-07-12 RX ORDER — AMOXICILLIN AND CLAVULANATE POTASSIUM 875; 125 MG/1; MG/1
1 TABLET, FILM COATED ORAL 2 TIMES DAILY
Qty: 14 TABLET | Refills: 0 | Status: SHIPPED | OUTPATIENT
Start: 2025-07-12

## 2025-07-12 RX ORDER — IBUPROFEN 600 MG/1
600 TABLET, FILM COATED ORAL EVERY 6 HOURS PRN
Qty: 20 TABLET | Refills: 0 | Status: SHIPPED | OUTPATIENT
Start: 2025-07-12

## 2025-07-12 RX ORDER — IBUPROFEN 600 MG/1
600 TABLET, FILM COATED ORAL
Status: COMPLETED | OUTPATIENT
Start: 2025-07-12 | End: 2025-07-12

## 2025-07-12 RX ORDER — HYDROCODONE BITARTRATE AND ACETAMINOPHEN 5; 325 MG/1; MG/1
1 TABLET ORAL
Refills: 0 | Status: DISCONTINUED | OUTPATIENT
Start: 2025-07-12 | End: 2025-07-12

## 2025-07-12 RX ORDER — HYDROCODONE BITARTRATE AND ACETAMINOPHEN 5; 325 MG/1; MG/1
1 TABLET ORAL EVERY 8 HOURS PRN
Qty: 8 TABLET | Refills: 0 | Status: SHIPPED | OUTPATIENT
Start: 2025-07-12

## 2025-07-12 RX ORDER — ONDANSETRON 4 MG/1
4 TABLET, ORALLY DISINTEGRATING ORAL 3 TIMES DAILY PRN
Qty: 20 TABLET | Refills: 0 | Status: SHIPPED | OUTPATIENT
Start: 2025-07-12

## 2025-07-12 RX ORDER — HYDROCODONE BITARTRATE AND ACETAMINOPHEN 5; 325 MG/1; MG/1
1 TABLET ORAL
Refills: 0 | Status: COMPLETED | OUTPATIENT
Start: 2025-07-12 | End: 2025-07-12

## 2025-07-12 RX ADMIN — HYDROCODONE BITARTRATE AND ACETAMINOPHEN 1 TABLET: 5; 325 TABLET ORAL at 10:07

## 2025-07-12 RX ADMIN — IBUPROFEN 600 MG: 600 TABLET ORAL at 10:07

## 2025-07-12 NOTE — ED PROVIDER NOTES
Encounter Date: 7/12/2025       History     Chief Complaint   Patient presents with    Otalgia     Pt to ED from home with c/o left ear pain, swelling, hearing loss and tenderness x 4 years. Pt scheduled for MRI on July 27,2025.     55 y.o. female with PMHx of PMHx of bipolar 1 disorder, asthma, COPD, manic depression, and seizures presents for emergent evaluation of acute on chronic left ear pain and swelling worsened X 5 days. Patient reports ongoing swelling and drainage of left ear X 4 years for which she has been seen in the ER several times as well as Otolaryngology (last 6/27/25) and diagnosed with auricular cyst of left ear.  she had CT  completed (last 6/12/25)  but Dr. Weir,  is requesting MRI  completed for further evaluation  prior to  completing biopsy.  states she had MRI scheduled for 6/27  which has been day of a severe flat and was not able to make it but it is rescheduled for a few weeks from now   And follow up appointment is scheduled for early August.  She states that her pain became intolerable 5 days ago with increased swelling to the ear.  She  states usually has  green, purulent drainage from  the auricle of the ear but states that over the last several days has not been draining.  She has a attempted treatment with Soma, ibuprofen in warm compresses without relief.  She is tolerating PO without difficulty.  She denies any new injuries, trauma, drainage from the internal canal, fevers, facial swelling, voice change, difficulty swallowing,  shortness of breath, dental pain or any other complaints at this time.    The history is provided by the patient and medical records.     Review of patient's allergies indicates:   Allergen Reactions    Grass pollen-june grass standard Itching     Past Medical History:   Diagnosis Date    Asthma     Bipolar 1 disorder     History of MRSA infection     Manic depression     Seizures      Past Surgical History:   Procedure Laterality Date    APPENDECTOMY       CARPAL TUNNEL RELEASE      COSMETIC SURGERY      breast implants     HERNIA REPAIR      HYSTERECTOMY      INSERTION OF BREAST IMPLANT      KNEE ARTHROPLASTY Right 6/1/2020    Procedure: ARTHROPLASTY, KNEE;  Surgeon: Vasiliy Ivory MD;  Location[facility];  Service: Orthopedics;  Laterality: Right;     Family History   Problem Relation Name Age of Onset    Schizophrenia Mother      Diabetes Father      Heart disease Father       Social History[1]  Review of Systems   Constitutional:  Negative for chills and fever.   HENT:  Positive for ear pain. Negative for dental problem, ear discharge, facial swelling, hearing loss and sore throat.    Eyes:  Negative for photophobia and visual disturbance.   Respiratory:  Negative for shortness of breath.    Cardiovascular:  Negative for chest pain.   Gastrointestinal:  Negative for abdominal pain, diarrhea, nausea and vomiting.   Genitourinary:  Negative for decreased urine volume, dysuria and hematuria.   Musculoskeletal:  Negative for myalgias and neck pain.   Skin:  Negative for rash.   Neurological:  Negative for dizziness, weakness, light-headedness and headaches.   Psychiatric/Behavioral: Negative.         Physical Exam     Initial Vitals [07/12/25 1000]   BP Pulse Resp Temp SpO2   (!) 193/79 99 16 98.2 °F (36.8 °C) 100 %      MAP       --         Physical Exam    Nursing note and vitals reviewed.  Constitutional: She appears well-developed and well-nourished. She is not diaphoretic. No distress.   HENT:   Head: Normocephalic and atraumatic.   Right Ear: External ear normal. Mouth/Throat: No oropharyngeal exudate.   Cauliflower like appearance to left edematous auricle with erythema and  TTP.  Green, odorous purulent drainage from auricle dimple with palpation.  Edema of auricle causing closure of external canal but able to enter otoscope easily and visualize tympanic membrane with no effusion, perforation or retraction.  Preauricular lymphadenopathy.  No  postauricular swelling, or overlying erythema or tenderness to palpation over mastoids.   Eyes: Conjunctivae and EOM are normal. Pupils are equal, round, and reactive to light. No scleral icterus.   Neck: Neck supple.   Normal range of motion.  Cardiovascular:  Normal rate.           Pulmonary/Chest: No stridor. No respiratory distress.   Musculoskeletal:         General: Normal range of motion.      Cervical back: Normal range of motion and neck supple.     Lymphadenopathy:     She has no cervical adenopathy.   Neurological: She is alert and oriented to person, place, and time.   Skin: No rash noted.   Psychiatric: She has a normal mood and affect. Thought content normal.         ED Course   Procedures  Labs Reviewed - No data to display       Imaging Results    None          Medications   ibuprofen tablet 600 mg (600 mg Oral Given 7/12/25 1053)   HYDROcodone-acetaminophen 5-325 mg per tablet 1 tablet (1 tablet Oral Given 7/12/25 1053)     Medical Decision Making  This is an emergent evaluation of a 53 y.o. female presenting to the ED for acute on chronic left external otalgia X years worsened 5 days. Denies trauma, fever, drainage from ear, and hearing loss.  Currently being followed by ENT due to known auricular cyst scheduled for MRI for further evaluation prior to biopsy.  States she was referred to pain management but next appointment is not until August. Afebrile. Patient is non-toxic.  See full physical above.      Differentials include but not limited to otitis media, otitis externa, perichondritis, mastoiditis, malignancy     Presentation consistent with perichondritis.  Due to increased swelling with serous purulent drainage, we will discharged home with Augmentin.  No evidence of OM.  No TM perforation.  No tenderness or erythema to mastoid.  Patient is established with Dr. Weir, Otolaryngology.  Recommend follow up as soon as possible as well as make sure her MRI is scheduled correctly.  Discussed  appropriate pain control and well as importance of returning to the emergency room if swelling worsens, pain worsens, high persistent fevers or other alarming concerns. The patient verbalized an understanding. The patient is asked if there are any questions or concerns. We discuss the case, until all issues are addressed to the patient's satisfaction. Patient understands and is agreeable to the plan.     Amount and/or Complexity of Data Reviewed  External Data Reviewed: labs, radiology and notes.    Risk  OTC drugs.  Prescription drug management.  Diagnosis or treatment significantly limited by social determinants of health.                                          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 Provider    amoxicillin-clavulanate 875-125mg (AUGMENTIN) 875-125 mg per tablet Take 1 tablet by mouth 2 (two) times daily. 14 tablet 7/12/2025 -- Isa Espinoza PA-C    ibuprofen (ADVIL,MOTRIN) 600 MG tablet Take 1 tablet (600 mg total) by mouth every 6 (six) hours as needed for Pain. 20 tablet 7/12/2025 -- Isa Espinoza PA-C    HYDROcodone-acetaminophen (NORCO) 5-325 mg per tablet Take 1 tablet by mouth every 8 (eight) hours as needed for Pain. 8 tablet 7/12/2025 -- Isa Espinoza PA-C    ondansetron (ZOFRAN-ODT) 4 MG TbDL Take 1 tablet (4 mg total) by mouth 3 (three) times daily as needed. 20 tablet 7/12/2025 -- Isa Espinoza PA-C          Follow-up Information       Follow up With Specialties Details Why Contact Info    [facility] [place] - Emergency Dept Emergency Medicine Go to  For new or worsening symptoms [address] [facility] [place] [address] [phone]    Zahida Weir MD Otolaryngology   [address] [place]  [address]  [phone]                   Isa Espinoza PA-C  07/13/25 0952         [1]   Social History  Tobacco Use    Smoking status: Every Day     Current packs/day: 0.50     Average packs/day: 0.5 packs/day for 30.0 years (15.0 ttl pk-yrs)     Types: Cigarettes    Smokeless tobacco: Never   Substance Use Topics    Alcohol use: No    Drug use: Yes     Types: Marijuana     Comment: last smoked today, states this is prescribed to me        Isa Espinoza PA-C  07/13/25 0959

## 2025-07-12 NOTE — DISCHARGE INSTRUCTIONS
Complete antibiotics as prescribed. Drink lots of fluids, stay well hydrated. Alternate Tylenol/Ibuprofen as needed for pain/ fever; go back and forth between these two medications every 4 hrs as needed for temp greater than or equal to 100.4F.  You may take 600 ibuprofen and 500 to a 1000 mg of Tylenol at 1 time.  Maximum dose of Tylenol and 1 day is 3000 mg in the maximum dose of ibuprofen and 1 day is 1200mg.  If pain persists, you were given a short course of narcotic pain medication called Norco.  Uses medications sparingly only for severe, intolerable pain. Be aware, this medication is sedating/ can cause increased sleepiness.  Do not mix with alcohol or any other sedating medications.  Do not drive or operate machinery when taking this medication.     It is important that you follow up with Dr. Weir regarding today's complaints as well as appropriate follow up.  Return to the ER if symptoms worsen such as severe, intolerable pain, increased swelling to the face, difficulty opening or closing her mouth, swelling to the eye or eye pain, high persistent fevers, chest pain, shortness of breath, dizziness or any other abnormal or alarming symptoms.

## 2025-07-14 ENCOUNTER — TELEPHONE (OUTPATIENT)
Dept: OTOLARYNGOLOGY | Facility: CLINIC | Age: 55
End: 2025-07-14
Payer: MEDICAID

## 2025-07-14 NOTE — TELEPHONE ENCOUNTER
Copied from CRM #[id number]. Topic: Appointments - Appointment Access  >> Jul 14, 2025  3:19 PM Loretta wrote:  Type:  Patient Returning Call    Who Called:pt  Who Left Message for Patient:pt  Does the patient know what this is regarding?:pt need new orders in to do her MRI she miss the appt on Friday   Would the patient rather a call back or a response via [facility]? call  Best Call Back Number:[phone]  Additional Information:

## 2025-07-29 ENCOUNTER — TELEPHONE (OUTPATIENT)
Dept: OTOLARYNGOLOGY | Facility: CLINIC | Age: 55
End: 2025-07-29
Payer: MEDICAID

## 2025-07-29 NOTE — TELEPHONE ENCOUNTER
Called patient due to her MRIs not being scheduled correctly. Patient rescheduled 1 MRI prior to appt but not the other scheduled for after her visit with the provider for the results. MRI Maxiofacillia needs to moved up or Both MRIs need to be scheduled and appt with provider on 8/4/2025 needs to be rescheduled to another day.

## 2025-08-01 ENCOUNTER — HOSPITAL ENCOUNTER (OUTPATIENT)
Dept: RADIOLOGY | Facility: OTHER | Age: 55
Discharge: HOME | End: 2025-08-01
Attending: OTOLARYNGOLOGY
Payer: MEDICAID

## 2025-08-01 DIAGNOSIS — M95.12 CAULIFLOWER EAR, LEFT: ICD-10-CM

## 2025-08-01 DIAGNOSIS — Q18.0 FIRST BRANCHIAL CLEFT CYST: ICD-10-CM

## 2025-08-01 DIAGNOSIS — K11.8 PAROTID GLAND FULLNESS: ICD-10-CM

## 2025-08-01 PROCEDURE — A9585 GADOBUTROL INJECTION: HCPCS | Performed by: OTOLARYNGOLOGY

## 2025-08-01 PROCEDURE — 25500020 PHARM REV CODE 255: Performed by: OTOLARYNGOLOGY

## 2025-08-01 PROCEDURE — 70553 MRI BRAIN STEM W/O & W/DYE: CPT | Mod: 26,,, | Performed by: RADIOLOGY

## 2025-08-01 PROCEDURE — 70553 MRI BRAIN STEM W/O & W/DYE: CPT | Mod: TC

## 2025-08-01 RX ORDER — GADOBUTROL 604.72 MG/ML
6 INJECTION INTRAVENOUS
Status: COMPLETED | OUTPATIENT
Start: 2025-08-01 | End: 2025-08-01

## 2025-08-01 RX ADMIN — GADOBUTROL 6 ML: 604.72 INJECTION INTRAVENOUS at 05:08

## 2025-08-04 ENCOUNTER — OFFICE VISIT (OUTPATIENT)
Dept: OTOLARYNGOLOGY | Facility: CLINIC | Age: 55
End: 2025-08-04
Payer: MEDICAID

## 2025-08-04 VITALS
HEIGHT: 59 IN | DIASTOLIC BLOOD PRESSURE: 76 MMHG | SYSTOLIC BLOOD PRESSURE: 152 MMHG | WEIGHT: 128.06 LBS | BODY MASS INDEX: 25.82 KG/M2

## 2025-08-04 DIAGNOSIS — H61.92 LESION OF LEFT EXTERNAL EAR: Primary | ICD-10-CM

## 2025-08-04 DIAGNOSIS — M95.12 CAULIFLOWER EAR, LEFT: ICD-10-CM

## 2025-08-04 DIAGNOSIS — Q18.1 AURICULAR CYST: ICD-10-CM

## 2025-08-04 PROCEDURE — 99214 OFFICE O/P EST MOD 30 MIN: CPT | Mod: 25,S$GLB,, | Performed by: OTOLARYNGOLOGY

## 2025-08-04 PROCEDURE — 3008F BODY MASS INDEX DOCD: CPT | Mod: CPTII,S$GLB,, | Performed by: OTOLARYNGOLOGY

## 2025-08-04 PROCEDURE — 69100 BIOPSY OF EXTERNAL EAR: CPT | Mod: S$GLB,,, | Performed by: OTOLARYNGOLOGY

## 2025-08-04 PROCEDURE — 3077F SYST BP >= 140 MM HG: CPT | Mod: CPTII,S$GLB,, | Performed by: OTOLARYNGOLOGY

## 2025-08-04 PROCEDURE — 3078F DIAST BP <80 MM HG: CPT | Mod: CPTII,S$GLB,, | Performed by: OTOLARYNGOLOGY

## 2025-08-04 PROCEDURE — 88305 TISSUE EXAM BY PATHOLOGIST: CPT | Mod: TC | Performed by: OTOLARYNGOLOGY

## 2025-08-04 RX ORDER — HYDROCODONE BITARTRATE AND ACETAMINOPHEN 5; 325 MG/1; MG/1
1 TABLET ORAL EVERY 6 HOURS PRN
Qty: 8 TABLET | Refills: 0 | Status: SHIPPED | OUTPATIENT
Start: 2025-08-04 | End: 2025-09-03 | Stop reason: SDUPTHER

## 2025-08-04 RX ORDER — MUPIROCIN 20 MG/G
OINTMENT TOPICAL 2 TIMES DAILY
Qty: 1 EACH | Refills: 0 | Status: SHIPPED | OUTPATIENT
Start: 2025-08-04 | End: 2025-08-18

## 2025-08-11 LAB
ESTROGEN SERPL-MCNC: NORMAL PG/ML
INSULIN SERPL-ACNC: NORMAL U[IU]/ML
LAB AP GROSS DESCRIPTION: NORMAL
LAB AP PERFORMING LOCATION(S): NORMAL
LAB AP REPORT FOOTNOTES: NORMAL
T3RU NFR SERPL: NORMAL %

## 2025-08-13 ENCOUNTER — HOSPITAL ENCOUNTER (EMERGENCY)
Facility: HOSPITAL | Age: 55
Discharge: HOME | End: 2025-08-13
Attending: EMERGENCY MEDICINE
Payer: MEDICAID

## 2025-08-13 VITALS
OXYGEN SATURATION: 94 % | RESPIRATION RATE: 18 BRPM | TEMPERATURE: 98 F | HEART RATE: 101 BPM | WEIGHT: 128.06 LBS | HEIGHT: 59 IN | BODY MASS INDEX: 25.82 KG/M2 | DIASTOLIC BLOOD PRESSURE: 73 MMHG | SYSTOLIC BLOOD PRESSURE: 133 MMHG

## 2025-08-13 DIAGNOSIS — M94.8X9 PERICHONDRITIS: ICD-10-CM

## 2025-08-13 DIAGNOSIS — H93.92 LESION OF LEFT EAR: Primary | ICD-10-CM

## 2025-08-13 PROCEDURE — 99283 EMERGENCY DEPT VISIT LOW MDM: CPT

## 2025-08-13 PROCEDURE — 25000003 PHARM REV CODE 250: Performed by: STUDENT IN AN ORGANIZED HEALTH CARE EDUCATION/TRAINING PROGRAM

## 2025-08-13 RX ORDER — AMOXICILLIN AND CLAVULANATE POTASSIUM 875; 125 MG/1; MG/1
1 TABLET, FILM COATED ORAL
Status: COMPLETED | OUTPATIENT
Start: 2025-08-13 | End: 2025-08-13

## 2025-08-13 RX ORDER — KETOROLAC TROMETHAMINE 10 MG/1
10 TABLET, FILM COATED ORAL
Status: COMPLETED | OUTPATIENT
Start: 2025-08-13 | End: 2025-08-13

## 2025-08-13 RX ORDER — FLUTICASONE PROPIONATE 50 MCG
1 SPRAY, SUSPENSION (ML) NASAL 2 TIMES DAILY PRN
Qty: 15 G | Refills: 0 | Status: SHIPPED | OUTPATIENT
Start: 2025-08-13

## 2025-08-13 RX ORDER — AMOXICILLIN AND CLAVULANATE POTASSIUM 875; 125 MG/1; MG/1
1 TABLET, FILM COATED ORAL 2 TIMES DAILY
Qty: 14 TABLET | Refills: 0 | Status: SHIPPED | OUTPATIENT
Start: 2025-08-13

## 2025-08-13 RX ADMIN — KETOROLAC TROMETHAMINE 10 MG: 10 TABLET, FILM COATED ORAL at 08:08

## 2025-08-13 RX ADMIN — AMOXICILLIN AND CLAVULANATE POTASSIUM 1 TABLET: 875; 125 TABLET, FILM COATED ORAL at 08:08

## 2025-08-25 ENCOUNTER — OFFICE VISIT (OUTPATIENT)
Dept: OTOLARYNGOLOGY | Facility: CLINIC | Age: 55
End: 2025-08-25
Payer: MEDICAID

## 2025-08-25 VITALS
SYSTOLIC BLOOD PRESSURE: 157 MMHG | DIASTOLIC BLOOD PRESSURE: 93 MMHG | WEIGHT: 128.06 LBS | BODY MASS INDEX: 25.82 KG/M2 | HEIGHT: 59 IN

## 2025-08-25 DIAGNOSIS — J30.2 SEASONAL ALLERGIC RHINITIS, UNSPECIFIED TRIGGER: ICD-10-CM

## 2025-08-25 DIAGNOSIS — Q18.1 AURICULAR CYST: ICD-10-CM

## 2025-08-25 DIAGNOSIS — M95.12 CAULIFLOWER EAR, LEFT: Primary | ICD-10-CM

## 2025-08-25 DIAGNOSIS — H69.92 DYSFUNCTION OF LEFT EUSTACHIAN TUBE: ICD-10-CM

## 2025-08-25 DIAGNOSIS — J34.3 HYPERTROPHY OF INFERIOR NASAL TURBINATE: ICD-10-CM

## 2025-08-25 PROCEDURE — 1159F MED LIST DOCD IN RCRD: CPT | Mod: CPTII,S$GLB,, | Performed by: OTOLARYNGOLOGY

## 2025-08-25 PROCEDURE — 99214 OFFICE O/P EST MOD 30 MIN: CPT | Mod: 25,S$GLB,, | Performed by: OTOLARYNGOLOGY

## 2025-08-25 PROCEDURE — 3008F BODY MASS INDEX DOCD: CPT | Mod: CPTII,S$GLB,, | Performed by: OTOLARYNGOLOGY

## 2025-08-25 PROCEDURE — 3077F SYST BP >= 140 MM HG: CPT | Mod: CPTII,S$GLB,, | Performed by: OTOLARYNGOLOGY

## 2025-08-25 PROCEDURE — 92567 TYMPANOMETRY: CPT | Mod: S$GLB,,, | Performed by: OTOLARYNGOLOGY

## 2025-08-25 PROCEDURE — 3080F DIAST BP >= 90 MM HG: CPT | Mod: CPTII,S$GLB,, | Performed by: OTOLARYNGOLOGY

## 2025-08-25 RX ORDER — AZELASTINE 1 MG/ML
1 SPRAY, METERED NASAL 2 TIMES DAILY
Qty: 30 ML | Refills: 3 | Status: SHIPPED | OUTPATIENT
Start: 2025-08-25

## 2025-08-25 RX ORDER — FLUTICASONE PROPIONATE 50 MCG
2 SPRAY, SUSPENSION (ML) NASAL 2 TIMES DAILY
Qty: 18.2 ML | Refills: 3 | Status: SHIPPED | OUTPATIENT
Start: 2025-08-25

## 2025-09-03 ENCOUNTER — OFFICE VISIT (OUTPATIENT)
Dept: OTOLARYNGOLOGY | Facility: CLINIC | Age: 55
End: 2025-09-03
Payer: MEDICAID

## 2025-09-03 VITALS
DIASTOLIC BLOOD PRESSURE: 81 MMHG | WEIGHT: 126.81 LBS | SYSTOLIC BLOOD PRESSURE: 118 MMHG | BODY MASS INDEX: 25.61 KG/M2 | HEART RATE: 112 BPM

## 2025-09-03 DIAGNOSIS — Z87.891 SMOKING HISTORY: ICD-10-CM

## 2025-09-03 DIAGNOSIS — M95.12 CAULIFLOWER EAR, LEFT: ICD-10-CM

## 2025-09-03 DIAGNOSIS — H61.92 LESION OF LEFT EXTERNAL EAR: ICD-10-CM

## 2025-09-03 DIAGNOSIS — R13.10 DYSPHAGIA, UNSPECIFIED TYPE: ICD-10-CM

## 2025-09-03 DIAGNOSIS — Q18.1 AURICULAR CYST: Primary | ICD-10-CM

## 2025-09-03 PROCEDURE — 1159F MED LIST DOCD IN RCRD: CPT | Mod: CPTII,S$GLB,,

## 2025-09-03 PROCEDURE — 3008F BODY MASS INDEX DOCD: CPT | Mod: CPTII,S$GLB,,

## 2025-09-03 PROCEDURE — 99214 OFFICE O/P EST MOD 30 MIN: CPT | Mod: 25,S$GLB,,

## 2025-09-03 PROCEDURE — 3074F SYST BP LT 130 MM HG: CPT | Mod: CPTII,S$GLB,,

## 2025-09-03 PROCEDURE — 31575 DIAGNOSTIC LARYNGOSCOPY: CPT | Mod: S$GLB,,,

## 2025-09-03 PROCEDURE — 3079F DIAST BP 80-89 MM HG: CPT | Mod: CPTII,S$GLB,,

## 2025-09-03 RX ORDER — CIPROFLOXACIN AND DEXAMETHASONE 3; 1 MG/ML; MG/ML
4 SUSPENSION/ DROPS AURICULAR (OTIC) 2 TIMES DAILY
Qty: 7.5 ML | Refills: 0 | Status: SHIPPED | OUTPATIENT
Start: 2025-09-03 | End: 2025-09-13

## 2025-09-03 RX ORDER — HYDROCODONE BITARTRATE AND ACETAMINOPHEN 5; 325 MG/1; MG/1
1 TABLET ORAL EVERY 6 HOURS PRN
Qty: 8 TABLET | Refills: 0 | Status: SHIPPED | OUTPATIENT
Start: 2025-09-03

## 2025-09-03 RX ORDER — NAPROXEN 500 MG/1
500 TABLET ORAL 2 TIMES DAILY WITH MEALS
Qty: 30 TABLET | Refills: 0 | Status: SHIPPED | OUTPATIENT
Start: 2025-09-03

## 2025-09-04 LAB
GRAM STN SPEC: NORMAL

## 2025-09-05 LAB — BACTERIA SPEC ANAEROBE CULT: NORMAL

## 2025-09-06 LAB — BACTERIA SPEC AEROBE CULT: NORMAL
